# Patient Record
Sex: MALE | Race: WHITE | NOT HISPANIC OR LATINO
[De-identification: names, ages, dates, MRNs, and addresses within clinical notes are randomized per-mention and may not be internally consistent; named-entity substitution may affect disease eponyms.]

---

## 2017-09-27 PROBLEM — Z00.00 ENCOUNTER FOR PREVENTIVE HEALTH EXAMINATION: Status: ACTIVE | Noted: 2017-09-27

## 2017-09-28 ENCOUNTER — APPOINTMENT (OUTPATIENT)
Dept: SURGERY | Facility: CLINIC | Age: 67
End: 2017-09-28
Payer: MEDICARE

## 2017-09-28 VITALS — WEIGHT: 124 LBS | HEIGHT: 66 IN | BODY MASS INDEX: 19.93 KG/M2

## 2017-09-28 PROCEDURE — 99203 OFFICE O/P NEW LOW 30 MIN: CPT

## 2017-10-27 ENCOUNTER — OUTPATIENT (OUTPATIENT)
Dept: OUTPATIENT SERVICES | Facility: HOSPITAL | Age: 67
LOS: 1 days | Discharge: HOME | End: 2017-10-27

## 2017-10-27 ENCOUNTER — APPOINTMENT (OUTPATIENT)
Dept: SURGERY | Facility: AMBULATORY SURGERY CENTER | Age: 67
End: 2017-10-27
Payer: MEDICARE

## 2017-10-27 PROCEDURE — 49505 PRP I/HERN INIT REDUC >5 YR: CPT | Mod: LT

## 2017-10-30 ENCOUNTER — TRANSCRIPTION ENCOUNTER (OUTPATIENT)
Age: 67
End: 2017-10-30

## 2017-10-31 DIAGNOSIS — K40.90 UNILATERAL INGUINAL HERNIA, WITHOUT OBSTRUCTION OR GANGRENE, NOT SPECIFIED AS RECURRENT: ICD-10-CM

## 2017-10-31 DIAGNOSIS — I10 ESSENTIAL (PRIMARY) HYPERTENSION: ICD-10-CM

## 2017-10-31 DIAGNOSIS — N40.0 BENIGN PROSTATIC HYPERPLASIA WITHOUT LOWER URINARY TRACT SYMPTOMS: ICD-10-CM

## 2017-11-07 ENCOUNTER — APPOINTMENT (OUTPATIENT)
Dept: SURGERY | Facility: CLINIC | Age: 67
End: 2017-11-07
Payer: MEDICARE

## 2017-11-07 PROCEDURE — 99024 POSTOP FOLLOW-UP VISIT: CPT

## 2018-04-27 ENCOUNTER — EMERGENCY (EMERGENCY)
Facility: HOSPITAL | Age: 68
LOS: 0 days | Discharge: HOME | End: 2018-04-28
Attending: EMERGENCY MEDICINE | Admitting: EMERGENCY MEDICINE

## 2018-04-27 VITALS
HEIGHT: 66 IN | DIASTOLIC BLOOD PRESSURE: 65 MMHG | SYSTOLIC BLOOD PRESSURE: 159 MMHG | TEMPERATURE: 98 F | WEIGHT: 125 LBS | OXYGEN SATURATION: 96 % | RESPIRATION RATE: 18 BRPM | HEART RATE: 80 BPM

## 2018-04-27 DIAGNOSIS — Z85.828 PERSONAL HISTORY OF OTHER MALIGNANT NEOPLASM OF SKIN: ICD-10-CM

## 2018-04-27 DIAGNOSIS — I10 ESSENTIAL (PRIMARY) HYPERTENSION: ICD-10-CM

## 2018-04-27 DIAGNOSIS — L76.22 POSTPROCEDURAL HEMORRHAGE OF SKIN AND SUBCUTANEOUS TISSUE FOLLOWING OTHER PROCEDURE: ICD-10-CM

## 2018-04-27 DIAGNOSIS — Z94.5 SKIN TRANSPLANT STATUS: ICD-10-CM

## 2018-04-27 DIAGNOSIS — Z98.890 OTHER SPECIFIED POSTPROCEDURAL STATES: Chronic | ICD-10-CM

## 2018-04-27 DIAGNOSIS — N40.0 BENIGN PROSTATIC HYPERPLASIA WITHOUT LOWER URINARY TRACT SYMPTOMS: ICD-10-CM

## 2018-04-27 NOTE — ED ADULT TRIAGE NOTE - CHIEF COMPLAINT QUOTE
pt BIBA, for uncontrolled bleeding to suture site. pt had a mohs surgery today to remove skin cancer, sutures in place.

## 2018-04-28 VITALS
HEART RATE: 78 BPM | RESPIRATION RATE: 18 BRPM | OXYGEN SATURATION: 97 % | TEMPERATURE: 98 F | SYSTOLIC BLOOD PRESSURE: 147 MMHG | DIASTOLIC BLOOD PRESSURE: 64 MMHG

## 2018-04-28 NOTE — ED PROVIDER NOTE - ATTENDING CONTRIBUTION TO CARE
66 yo male s/p surgery with graft c/o oozing from surgical site.  Pt removed dressing pta in ED.  Small amount of oozing from left wound margin.  Plan  to page his surgeon and reeval.

## 2018-04-28 NOTE — ED PROVIDER NOTE - OBJECTIVE STATEMENT
66 yo male with PMHx BPH, HTN, skin cancer presents for oozing from surgical wound from this morning. Patient has graft surgery this morning at [facility] and oozing from wound made him nervous so he come to ED. Patient denies fevers, chest pain, SOB, syncope, abdominal pain, nausea, vomiting, diarrhea, constipation, dizziness, weakness, recent travel, changes in PO intake, changes in urine output, changes in mental status.

## 2018-04-28 NOTE — ED PROVIDER NOTE - PHYSICAL EXAMINATION
GEN: Well appearing, in no apparent distress.    HEAD:  Normocephalic, (+) sutures in place on top of head and continues to forehead with bridge to nasal tip. oozing from bridge of skin from forehead to nose tip.    EYES:  Clear conjunctivae without injection, drainage or discharge.    ENMT:  Nasal mucosa moist; mouth moist without ulcerations or lesions; throat moist without erythema, exudate, ulcerations or lesions.    NECK:  Supple, no masses, no significant lymphadenopathy. Normal ROM.    CARDIAC:  RRR, normal S1 and S2, no murmurs, rubs or gallops.    RESP:  Respiratory rate and effort appear normal; lungs are clear to auscultation bilaterally; no rhonchi, rales or wheezes.    ABDOMEN:  Soft, non-tender, non-distended, no masses. Normal BS throughout.    MUSCULOSKELETAL/NEURO:  AAO x 3. Motor 5/5. Sensory intact. CN II – XII intact. Patient able to ambulate without difficulty.    SKIN:  Normal skin color for age and race, well-perfused; warm and dry.

## 2018-04-28 NOTE — ED PROVIDER NOTE - NS ED ROS FT
Constitutional: No fevers/chills.    Head: No injury, headache.    Eyes:  No visual changes, eye pain or discharge. No injury.    ENMT:  No hearing changes, pain, discharge or infections. No neck pain or stiffness. No loss ROM.    Cardiac:  No chest pain, SOB or edema. No chest pain with exertion.    Respiratory:  No cough or respiratory distress.     GI:  No nausea, vomiting, diarrhea or abdominal pain. No anorexia. No change in PO intake.    :  No dysuria, frequency, urgency or burning. No change in urine output.    MS:  No myalgia, muscle weakness, joint pain or back pain. No loss ROM.    Neuro:  No dizziness or weakness.  No LOC.    Skin:  No skin rash.     Endocrine: No history of thyroid disease or diabetes.

## 2018-08-27 PROBLEM — C44.90 UNSPECIFIED MALIGNANT NEOPLASM OF SKIN, UNSPECIFIED: Chronic | Status: ACTIVE | Noted: 2018-04-27

## 2018-08-27 PROBLEM — I10 ESSENTIAL (PRIMARY) HYPERTENSION: Chronic | Status: ACTIVE | Noted: 2018-04-27

## 2018-08-27 PROBLEM — N40.0 BENIGN PROSTATIC HYPERPLASIA WITHOUT LOWER URINARY TRACT SYMPTOMS: Chronic | Status: ACTIVE | Noted: 2018-04-27

## 2018-09-20 ENCOUNTER — OUTPATIENT (OUTPATIENT)
Dept: OUTPATIENT SERVICES | Facility: HOSPITAL | Age: 68
LOS: 1 days | Discharge: HOME | End: 2018-09-20

## 2018-09-20 DIAGNOSIS — Z98.890 OTHER SPECIFIED POSTPROCEDURAL STATES: Chronic | ICD-10-CM

## 2018-09-21 DIAGNOSIS — N39.0 URINARY TRACT INFECTION, SITE NOT SPECIFIED: ICD-10-CM

## 2018-10-03 ENCOUNTER — OUTPATIENT (OUTPATIENT)
Dept: OUTPATIENT SERVICES | Facility: HOSPITAL | Age: 68
LOS: 1 days | Discharge: HOME | End: 2018-10-03

## 2018-10-03 DIAGNOSIS — Z98.890 OTHER SPECIFIED POSTPROCEDURAL STATES: Chronic | ICD-10-CM

## 2018-10-03 DIAGNOSIS — R51 HEADACHE: ICD-10-CM

## 2018-10-05 ENCOUNTER — APPOINTMENT (OUTPATIENT)
Dept: UROLOGY | Facility: CLINIC | Age: 68
End: 2018-10-05
Payer: MEDICARE

## 2018-10-05 VITALS
SYSTOLIC BLOOD PRESSURE: 208 MMHG | DIASTOLIC BLOOD PRESSURE: 70 MMHG | HEART RATE: 66 BPM | BODY MASS INDEX: 19.93 KG/M2 | HEIGHT: 66 IN | WEIGHT: 124 LBS

## 2018-10-05 DIAGNOSIS — Z85.118 PERSONAL HISTORY OF OTHER MALIGNANT NEOPLASM OF BRONCHUS AND LUNG: ICD-10-CM

## 2018-10-05 DIAGNOSIS — R35.1 NOCTURIA: ICD-10-CM

## 2018-10-05 DIAGNOSIS — K46.9 UNSPECIFIED ABDOMINAL HERNIA W/OUT OBSTRUCTION OR GANGRENE: ICD-10-CM

## 2018-10-05 DIAGNOSIS — Z78.9 OTHER SPECIFIED HEALTH STATUS: ICD-10-CM

## 2018-10-05 DIAGNOSIS — I10 ESSENTIAL (PRIMARY) HYPERTENSION: ICD-10-CM

## 2018-10-05 DIAGNOSIS — Z80.1 FAMILY HISTORY OF MALIGNANT NEOPLASM OF TRACHEA, BRONCHUS AND LUNG: ICD-10-CM

## 2018-10-05 PROCEDURE — 99203 OFFICE O/P NEW LOW 30 MIN: CPT

## 2018-10-05 RX ORDER — TAMSULOSIN HYDROCHLORIDE 0.4 MG/1
0.4 CAPSULE ORAL
Refills: 0 | Status: ACTIVE | COMMUNITY

## 2018-10-05 RX ORDER — LOSARTAN POTASSIUM 100 MG/1
100 TABLET, FILM COATED ORAL
Refills: 0 | Status: ACTIVE | COMMUNITY

## 2018-10-05 RX ORDER — PRAVASTATIN SODIUM 20 MG/1
20 TABLET ORAL
Refills: 0 | Status: ACTIVE | COMMUNITY

## 2018-10-30 ENCOUNTER — OUTPATIENT (OUTPATIENT)
Dept: OUTPATIENT SERVICES | Facility: HOSPITAL | Age: 68
LOS: 1 days | Discharge: HOME | End: 2018-10-30

## 2018-10-30 DIAGNOSIS — F41.1 GENERALIZED ANXIETY DISORDER: ICD-10-CM

## 2018-10-30 DIAGNOSIS — Z98.890 OTHER SPECIFIED POSTPROCEDURAL STATES: Chronic | ICD-10-CM

## 2018-11-15 ENCOUNTER — OUTPATIENT (OUTPATIENT)
Dept: OUTPATIENT SERVICES | Facility: HOSPITAL | Age: 68
LOS: 1 days | Discharge: HOME | End: 2018-11-15

## 2018-11-15 DIAGNOSIS — F33.3 MAJOR DEPRESSIVE DISORDER, RECURRENT, SEVERE WITH PSYCHOTIC SYMPTOMS: ICD-10-CM

## 2018-11-15 DIAGNOSIS — Z98.890 OTHER SPECIFIED POSTPROCEDURAL STATES: Chronic | ICD-10-CM

## 2018-12-06 ENCOUNTER — OUTPATIENT (OUTPATIENT)
Dept: OUTPATIENT SERVICES | Facility: HOSPITAL | Age: 68
LOS: 1 days | Discharge: HOME | End: 2018-12-06

## 2018-12-06 DIAGNOSIS — F33.3 MAJOR DEPRESSIVE DISORDER, RECURRENT, SEVERE WITH PSYCHOTIC SYMPTOMS: ICD-10-CM

## 2018-12-06 DIAGNOSIS — Z98.890 OTHER SPECIFIED POSTPROCEDURAL STATES: Chronic | ICD-10-CM

## 2018-12-18 ENCOUNTER — FORM ENCOUNTER (OUTPATIENT)
Age: 68
End: 2018-12-18

## 2018-12-19 ENCOUNTER — OUTPATIENT (OUTPATIENT)
Dept: OUTPATIENT SERVICES | Facility: HOSPITAL | Age: 68
LOS: 1 days | Discharge: HOME | End: 2018-12-19

## 2018-12-19 DIAGNOSIS — R35.1 NOCTURIA: ICD-10-CM

## 2018-12-19 DIAGNOSIS — Z98.890 OTHER SPECIFIED POSTPROCEDURAL STATES: Chronic | ICD-10-CM

## 2019-01-07 ENCOUNTER — OUTPATIENT (OUTPATIENT)
Dept: OUTPATIENT SERVICES | Facility: HOSPITAL | Age: 69
LOS: 1 days | Discharge: HOME | End: 2019-01-07

## 2019-01-07 DIAGNOSIS — F33.3 MAJOR DEPRESSIVE DISORDER, RECURRENT, SEVERE WITH PSYCHOTIC SYMPTOMS: ICD-10-CM

## 2019-01-07 DIAGNOSIS — Z98.890 OTHER SPECIFIED POSTPROCEDURAL STATES: Chronic | ICD-10-CM

## 2019-01-27 ENCOUNTER — LABORATORY RESULT (OUTPATIENT)
Age: 69
End: 2019-01-27

## 2019-01-28 ENCOUNTER — OUTPATIENT (OUTPATIENT)
Dept: OUTPATIENT SERVICES | Facility: HOSPITAL | Age: 69
LOS: 1 days | Discharge: HOME | End: 2019-01-28

## 2019-01-28 ENCOUNTER — APPOINTMENT (OUTPATIENT)
Dept: UROLOGY | Facility: CLINIC | Age: 69
End: 2019-01-28
Payer: MEDICARE

## 2019-01-28 DIAGNOSIS — Z98.890 OTHER SPECIFIED POSTPROCEDURAL STATES: Chronic | ICD-10-CM

## 2019-01-28 DIAGNOSIS — R35.1 NOCTURIA: ICD-10-CM

## 2019-01-28 LAB
BILIRUB UR QL STRIP: NORMAL
CLARITY UR: CLEAR
COLLECTION METHOD: NORMAL
GLUCOSE UR-MCNC: NORMAL
HCG UR QL: NORMAL EU/DL
HGB UR QL STRIP.AUTO: NORMAL
KETONES UR-MCNC: NORMAL
LEUKOCYTE ESTERASE UR QL STRIP: 500
NITRITE UR QL STRIP: NORMAL
PH UR STRIP: 6
PROT UR STRIP-MCNC: NORMAL
SP GR UR STRIP: 1.01

## 2019-01-28 PROCEDURE — 52000 CYSTOURETHROSCOPY: CPT

## 2019-01-28 PROCEDURE — 81003 URINALYSIS AUTO W/O SCOPE: CPT | Mod: QW

## 2019-01-29 LAB
APPEARANCE: ABNORMAL
BILIRUBIN URINE: NEGATIVE
BLOOD URINE: NEGATIVE
COLOR: YELLOW
GLUCOSE QUALITATIVE U: NEGATIVE MG/DL
KETONES URINE: NEGATIVE
LEUKOCYTE ESTERASE URINE: ABNORMAL
NITRITE URINE: NEGATIVE
PH URINE: 7
PROTEIN URINE: NEGATIVE MG/DL
SPECIFIC GRAVITY URINE: 1.01
UROBILINOGEN URINE: 0.2 MG/DL (ref 0.2–?)

## 2019-01-31 LAB — BACTERIA UR CULT: ABNORMAL

## 2019-02-01 DIAGNOSIS — F33.3 MAJOR DEPRESSIVE DISORDER, RECURRENT, SEVERE WITH PSYCHOTIC SYMPTOMS: ICD-10-CM

## 2019-02-06 ENCOUNTER — OUTPATIENT (OUTPATIENT)
Dept: OUTPATIENT SERVICES | Facility: HOSPITAL | Age: 69
LOS: 1 days | Discharge: HOME | End: 2019-02-06

## 2019-02-06 DIAGNOSIS — F33.3 MAJOR DEPRESSIVE DISORDER, RECURRENT, SEVERE WITH PSYCHOTIC SYMPTOMS: ICD-10-CM

## 2019-02-06 DIAGNOSIS — Z98.890 OTHER SPECIFIED POSTPROCEDURAL STATES: Chronic | ICD-10-CM

## 2019-02-13 ENCOUNTER — OUTPATIENT (OUTPATIENT)
Dept: OUTPATIENT SERVICES | Facility: HOSPITAL | Age: 69
LOS: 1 days | Discharge: HOME | End: 2019-02-13

## 2019-02-13 VITALS
TEMPERATURE: 97 F | HEIGHT: 66 IN | DIASTOLIC BLOOD PRESSURE: 64 MMHG | OXYGEN SATURATION: 100 % | HEART RATE: 63 BPM | RESPIRATION RATE: 15 BRPM | SYSTOLIC BLOOD PRESSURE: 140 MMHG | WEIGHT: 104.5 LBS

## 2019-02-13 DIAGNOSIS — Z01.818 ENCOUNTER FOR OTHER PREPROCEDURAL EXAMINATION: ICD-10-CM

## 2019-02-13 DIAGNOSIS — C80.1 MALIGNANT (PRIMARY) NEOPLASM, UNSPECIFIED: ICD-10-CM

## 2019-02-13 DIAGNOSIS — C67.9 MALIGNANT NEOPLASM OF BLADDER, UNSPECIFIED: ICD-10-CM

## 2019-02-13 DIAGNOSIS — F41.9 ANXIETY DISORDER, UNSPECIFIED: ICD-10-CM

## 2019-02-13 DIAGNOSIS — K40.90 UNILATERAL INGUINAL HERNIA, WITHOUT OBSTRUCTION OR GANGRENE, NOT SPECIFIED AS RECURRENT: Chronic | ICD-10-CM

## 2019-02-13 DIAGNOSIS — Z98.890 OTHER SPECIFIED POSTPROCEDURAL STATES: Chronic | ICD-10-CM

## 2019-02-13 DIAGNOSIS — E78.00 PURE HYPERCHOLESTEROLEMIA, UNSPECIFIED: ICD-10-CM

## 2019-02-13 DIAGNOSIS — F32.9 MAJOR DEPRESSIVE DISORDER, SINGLE EPISODE, UNSPECIFIED: ICD-10-CM

## 2019-02-13 DIAGNOSIS — I10 ESSENTIAL (PRIMARY) HYPERTENSION: ICD-10-CM

## 2019-02-13 DIAGNOSIS — N40.0 BENIGN PROSTATIC HYPERPLASIA WITHOUT LOWER URINARY TRACT SYMPTOMS: ICD-10-CM

## 2019-02-13 LAB
ALBUMIN SERPL ELPH-MCNC: 3.9 G/DL — SIGNIFICANT CHANGE UP (ref 3.5–5.2)
ALP SERPL-CCNC: 95 U/L — SIGNIFICANT CHANGE UP (ref 30–115)
ALT FLD-CCNC: 15 U/L — SIGNIFICANT CHANGE UP (ref 0–41)
ANION GAP SERPL CALC-SCNC: 13 MMOL/L — SIGNIFICANT CHANGE UP (ref 7–14)
APPEARANCE UR: CLEAR — SIGNIFICANT CHANGE UP
APTT BLD: 31 SEC — SIGNIFICANT CHANGE UP (ref 27–39.2)
AST SERPL-CCNC: 21 U/L — SIGNIFICANT CHANGE UP (ref 0–41)
BILIRUB SERPL-MCNC: 0.4 MG/DL — SIGNIFICANT CHANGE UP (ref 0.2–1.2)
BILIRUB UR-MCNC: NEGATIVE — SIGNIFICANT CHANGE UP
BUN SERPL-MCNC: 23 MG/DL — HIGH (ref 10–20)
CALCIUM SERPL-MCNC: 9.4 MG/DL — SIGNIFICANT CHANGE UP (ref 8.5–10.1)
CHLORIDE SERPL-SCNC: 99 MMOL/L — SIGNIFICANT CHANGE UP (ref 98–110)
CO2 SERPL-SCNC: 28 MMOL/L — SIGNIFICANT CHANGE UP (ref 17–32)
COLOR SPEC: YELLOW — SIGNIFICANT CHANGE UP
CREAT SERPL-MCNC: 1 MG/DL — SIGNIFICANT CHANGE UP (ref 0.7–1.5)
DIFF PNL FLD: NEGATIVE — SIGNIFICANT CHANGE UP
GLUCOSE SERPL-MCNC: 57 MG/DL — LOW (ref 70–99)
GLUCOSE UR QL: NEGATIVE MG/DL — SIGNIFICANT CHANGE UP
HCT VFR BLD CALC: 31.5 % — LOW (ref 42–52)
HGB BLD-MCNC: 10.2 G/DL — LOW (ref 14–18)
INR BLD: 0.91 RATIO — SIGNIFICANT CHANGE UP (ref 0.65–1.3)
KETONES UR-MCNC: NEGATIVE — SIGNIFICANT CHANGE UP
LEUKOCYTE ESTERASE UR-ACNC: NEGATIVE — SIGNIFICANT CHANGE UP
MCHC RBC-ENTMCNC: 28.8 PG — SIGNIFICANT CHANGE UP (ref 27–31)
MCHC RBC-ENTMCNC: 32.4 G/DL — SIGNIFICANT CHANGE UP (ref 32–37)
MCV RBC AUTO: 89 FL — SIGNIFICANT CHANGE UP (ref 80–94)
NITRITE UR-MCNC: NEGATIVE — SIGNIFICANT CHANGE UP
NRBC # BLD: 0 /100 WBCS — SIGNIFICANT CHANGE UP (ref 0–0)
PH UR: 7 — SIGNIFICANT CHANGE UP (ref 5–8)
PLATELET # BLD AUTO: 227 K/UL — SIGNIFICANT CHANGE UP (ref 130–400)
POTASSIUM SERPL-MCNC: 4.5 MMOL/L — SIGNIFICANT CHANGE UP (ref 3.5–5)
POTASSIUM SERPL-SCNC: 4.5 MMOL/L — SIGNIFICANT CHANGE UP (ref 3.5–5)
PROT SERPL-MCNC: 6.7 G/DL — SIGNIFICANT CHANGE UP (ref 6–8)
PROT UR-MCNC: NEGATIVE MG/DL — SIGNIFICANT CHANGE UP
PROTHROM AB SERPL-ACNC: 10.5 SEC — SIGNIFICANT CHANGE UP (ref 9.95–12.87)
RBC # BLD: 3.54 M/UL — LOW (ref 4.7–6.1)
RBC # FLD: 13.1 % — SIGNIFICANT CHANGE UP (ref 11.5–14.5)
SODIUM SERPL-SCNC: 140 MMOL/L — SIGNIFICANT CHANGE UP (ref 135–146)
SP GR SPEC: 1.02 — SIGNIFICANT CHANGE UP (ref 1.01–1.03)
UROBILINOGEN FLD QL: 0.2 MG/DL — SIGNIFICANT CHANGE UP (ref 0.2–0.2)
WBC # BLD: 7.16 K/UL — SIGNIFICANT CHANGE UP (ref 4.8–10.8)
WBC # FLD AUTO: 7.16 K/UL — SIGNIFICANT CHANGE UP (ref 4.8–10.8)

## 2019-02-13 NOTE — H&P PST ADULT - HISTORY OF PRESENT ILLNESS
68 year old male here for cystoscopy ,patient was getting a "strange feeling like he had to urinate"  fos=1  denies chest pain sob palp  denies recent uri or uti

## 2019-02-14 LAB
CULTURE RESULTS: NO GROWTH — SIGNIFICANT CHANGE UP
SPECIMEN SOURCE: SIGNIFICANT CHANGE UP

## 2019-02-27 ENCOUNTER — RESULT REVIEW (OUTPATIENT)
Age: 69
End: 2019-02-27

## 2019-02-27 ENCOUNTER — OUTPATIENT (OUTPATIENT)
Dept: OUTPATIENT SERVICES | Facility: HOSPITAL | Age: 69
LOS: 1 days | Discharge: HOME | End: 2019-02-27

## 2019-02-27 ENCOUNTER — APPOINTMENT (OUTPATIENT)
Dept: UROLOGY | Facility: HOSPITAL | Age: 69
End: 2019-02-27
Payer: MEDICARE

## 2019-02-27 VITALS
HEIGHT: 66 IN | OXYGEN SATURATION: 100 % | SYSTOLIC BLOOD PRESSURE: 123 MMHG | DIASTOLIC BLOOD PRESSURE: 55 MMHG | RESPIRATION RATE: 18 BRPM | TEMPERATURE: 97 F | HEART RATE: 62 BPM | WEIGHT: 104.06 LBS

## 2019-02-27 VITALS — DIASTOLIC BLOOD PRESSURE: 59 MMHG | HEART RATE: 68 BPM | SYSTOLIC BLOOD PRESSURE: 168 MMHG | RESPIRATION RATE: 17 BRPM

## 2019-02-27 DIAGNOSIS — K40.90 UNILATERAL INGUINAL HERNIA, WITHOUT OBSTRUCTION OR GANGRENE, NOT SPECIFIED AS RECURRENT: Chronic | ICD-10-CM

## 2019-02-27 DIAGNOSIS — Z98.890 OTHER SPECIFIED POSTPROCEDURAL STATES: Chronic | ICD-10-CM

## 2019-02-27 PROBLEM — F41.9 ANXIETY DISORDER, UNSPECIFIED: Chronic | Status: ACTIVE | Noted: 2019-02-13

## 2019-02-27 PROBLEM — E78.00 PURE HYPERCHOLESTEROLEMIA, UNSPECIFIED: Chronic | Status: ACTIVE | Noted: 2019-02-13

## 2019-02-27 PROBLEM — F32.9 MAJOR DEPRESSIVE DISORDER, SINGLE EPISODE, UNSPECIFIED: Chronic | Status: ACTIVE | Noted: 2019-02-13

## 2019-02-27 PROCEDURE — 52224 CYSTOSCOPY AND TREATMENT: CPT

## 2019-02-27 RX ORDER — ONDANSETRON 8 MG/1
4 TABLET, FILM COATED ORAL ONCE
Qty: 0 | Refills: 0 | Status: DISCONTINUED | OUTPATIENT
Start: 2019-02-27 | End: 2019-02-27

## 2019-02-27 RX ORDER — SODIUM CHLORIDE 9 MG/ML
1000 INJECTION, SOLUTION INTRAVENOUS
Qty: 0 | Refills: 0 | Status: DISCONTINUED | OUTPATIENT
Start: 2019-02-27 | End: 2019-02-27

## 2019-02-27 RX ORDER — HYDROMORPHONE HYDROCHLORIDE 2 MG/ML
0.5 INJECTION INTRAMUSCULAR; INTRAVENOUS; SUBCUTANEOUS
Qty: 0 | Refills: 0 | Status: DISCONTINUED | OUTPATIENT
Start: 2019-02-27 | End: 2019-02-27

## 2019-02-27 RX ADMIN — SODIUM CHLORIDE 100 MILLILITER(S): 9 INJECTION, SOLUTION INTRAVENOUS at 09:10

## 2019-02-27 NOTE — BRIEF OPERATIVE NOTE - PROCEDURE
<<-----Click on this checkbox to enter Procedure Cystoscopy  02/27/2019  w bladder biopsy and fulguration  Active  JERRI

## 2019-02-27 NOTE — ASU PATIENT PROFILE, ADULT - PMH
Anxiety    BPH (benign prostatic hyperplasia)    Depression    Hypercholesteremia    Hypertension    Skin cancer

## 2019-02-27 NOTE — ASU DISCHARGE PLAN (ADULT/PEDIATRIC). - SPECIAL INSTRUCTIONS
You may take tylenol and/or ibuprofen (advil/motrin) for pain as needed.  Blood In urine is expected. if you unable to urinate or if you develop fevers or chills >100.4 not improving w tylenol you should visit emergency room and alert Dr Chavez.  Dr Chavez's office will call you for a follow up appointment in 2 weeks to review results You may take tylenol and/or ibuprofen (advil/motrin) for pain as needed. Please  antibiotics in pharmacy to start taking today  Blood In urine is expected. if you unable to urinate or if you develop fevers or chills >100.4 not improving w tylenol you should visit emergency room and alert Dr Chavez.  Dr Chavez's office will call you for a follow up appointment in 2 weeks to review results

## 2019-02-27 NOTE — BRIEF OPERATIVE NOTE - OPERATION/FINDINGS
no evidence of bladder tumors, right lateral wall very mild erythema, redundant dome mucosa/nodule at dome biopsied

## 2019-03-05 LAB — SURGICAL PATHOLOGY STUDY: SIGNIFICANT CHANGE UP

## 2019-03-06 ENCOUNTER — OUTPATIENT (OUTPATIENT)
Dept: OUTPATIENT SERVICES | Facility: HOSPITAL | Age: 69
LOS: 1 days | Discharge: HOME | End: 2019-03-06

## 2019-03-06 DIAGNOSIS — K40.90 UNILATERAL INGUINAL HERNIA, WITHOUT OBSTRUCTION OR GANGRENE, NOT SPECIFIED AS RECURRENT: Chronic | ICD-10-CM

## 2019-03-06 DIAGNOSIS — F33.3 MAJOR DEPRESSIVE DISORDER, RECURRENT, SEVERE WITH PSYCHOTIC SYMPTOMS: ICD-10-CM

## 2019-03-06 DIAGNOSIS — Z98.890 OTHER SPECIFIED POSTPROCEDURAL STATES: Chronic | ICD-10-CM

## 2019-03-07 DIAGNOSIS — I10 ESSENTIAL (PRIMARY) HYPERTENSION: ICD-10-CM

## 2019-03-07 DIAGNOSIS — N32.9 BLADDER DISORDER, UNSPECIFIED: ICD-10-CM

## 2019-03-07 DIAGNOSIS — E78.00 PURE HYPERCHOLESTEROLEMIA, UNSPECIFIED: ICD-10-CM

## 2019-03-07 DIAGNOSIS — N30.20 OTHER CHRONIC CYSTITIS WITHOUT HEMATURIA: ICD-10-CM

## 2019-03-07 DIAGNOSIS — F41.8 OTHER SPECIFIED ANXIETY DISORDERS: ICD-10-CM

## 2019-03-12 ENCOUNTER — APPOINTMENT (OUTPATIENT)
Dept: UROLOGY | Facility: CLINIC | Age: 69
End: 2019-03-12
Payer: MEDICARE

## 2019-03-12 DIAGNOSIS — N32.9 BLADDER DISORDER, UNSPECIFIED: ICD-10-CM

## 2019-03-12 DIAGNOSIS — N32.89 OTHER SPECIFIED DISORDERS OF BLADDER: ICD-10-CM

## 2019-03-12 PROCEDURE — 99213 OFFICE O/P EST LOW 20 MIN: CPT

## 2019-03-12 NOTE — LETTER HEADER
[FreeTextEntry3] : Jose Daniel Chavez MD\par [facility]\par \par [facility]\par Division of Urology\par [address]\par [address]\[place] [address]\par Tel [phone]\par Fax [phone]\par \par

## 2019-03-12 NOTE — LETTER BODY
[Dear  ___] : Dear  [unfilled], [Consult Letter:] : I had the pleasure of evaluating your patient, [unfilled]. [Please see my note below.] : Please see my note below. [Consult Closing:] : Thank you very much for allowing me to participate in the care of this patient.  If you have any questions, please do not hesitate to contact me. [Sincerely,] : Sincerely, [FreeTextEntry2] : Pasquale Menjivar MD [FreeTextEntry3] : Jose Daniel Chavez MD\par [facility] Surgery\par [facility]\par Division of Urology\par

## 2019-03-12 NOTE — HISTORY OF PRESENT ILLNESS
[FreeTextEntry1] : MATTHEW RAO is a 68 year old male presents who was found to have a possible lesion in the bladder sp cytoscopy bladder biopsy.\par PATHOLOGY negative.\par Findings included-- no evidence of bladder tumors, right lateral wall very mild erythema, redundant dome mucosa/nodule at dome biopsied and fulgurated.\par  \par UCytol neg\par No microheme\par doing well post op no hematuria f c

## 2019-03-12 NOTE — ASSESSMENT
[FreeTextEntry1] : MATTHEW RAO is a 68 year old male presents who was found to have a possible lesion in the bladder sp cytoscopy bladder biopsy.\par PATHOLOGY negative.\par Given the patient had no microscopic and his urine cytology was negative, he can follow up on an as-needed basis and if he develops worsening LUTS or hematuria. I also explained this to the patient's son over the phone.\par \par I will search patient to follow up with his primary care physician for this severe recent weight loss\par

## 2019-03-13 ENCOUNTER — TRANSCRIPTION ENCOUNTER (OUTPATIENT)
Age: 69
End: 2019-03-13

## 2019-04-01 ENCOUNTER — OUTPATIENT (OUTPATIENT)
Dept: OUTPATIENT SERVICES | Facility: HOSPITAL | Age: 69
LOS: 1 days | Discharge: HOME | End: 2019-04-01

## 2019-04-01 DIAGNOSIS — F33.3 MAJOR DEPRESSIVE DISORDER, RECURRENT, SEVERE WITH PSYCHOTIC SYMPTOMS: ICD-10-CM

## 2019-04-01 DIAGNOSIS — Z98.890 OTHER SPECIFIED POSTPROCEDURAL STATES: Chronic | ICD-10-CM

## 2019-04-01 DIAGNOSIS — K40.90 UNILATERAL INGUINAL HERNIA, WITHOUT OBSTRUCTION OR GANGRENE, NOT SPECIFIED AS RECURRENT: Chronic | ICD-10-CM

## 2019-05-01 ENCOUNTER — OUTPATIENT (OUTPATIENT)
Dept: OUTPATIENT SERVICES | Facility: HOSPITAL | Age: 69
LOS: 1 days | Discharge: HOME | End: 2019-05-01
Payer: MEDICARE

## 2019-05-01 DIAGNOSIS — F33.3 MAJOR DEPRESSIVE DISORDER, RECURRENT, SEVERE WITH PSYCHOTIC SYMPTOMS: ICD-10-CM

## 2019-05-01 DIAGNOSIS — Z98.890 OTHER SPECIFIED POSTPROCEDURAL STATES: Chronic | ICD-10-CM

## 2019-05-01 DIAGNOSIS — K40.90 UNILATERAL INGUINAL HERNIA, WITHOUT OBSTRUCTION OR GANGRENE, NOT SPECIFIED AS RECURRENT: Chronic | ICD-10-CM

## 2019-05-01 PROCEDURE — 99213 OFFICE O/P EST LOW 20 MIN: CPT

## 2019-06-13 ENCOUNTER — OUTPATIENT (OUTPATIENT)
Dept: OUTPATIENT SERVICES | Facility: HOSPITAL | Age: 69
LOS: 1 days | Discharge: HOME | End: 2019-06-13
Payer: MEDICARE

## 2019-06-13 DIAGNOSIS — Z98.890 OTHER SPECIFIED POSTPROCEDURAL STATES: Chronic | ICD-10-CM

## 2019-06-13 DIAGNOSIS — F33.3 MAJOR DEPRESSIVE DISORDER, RECURRENT, SEVERE WITH PSYCHOTIC SYMPTOMS: ICD-10-CM

## 2019-06-13 DIAGNOSIS — K40.90 UNILATERAL INGUINAL HERNIA, WITHOUT OBSTRUCTION OR GANGRENE, NOT SPECIFIED AS RECURRENT: Chronic | ICD-10-CM

## 2019-06-13 PROCEDURE — 99213 OFFICE O/P EST LOW 20 MIN: CPT

## 2019-07-15 ENCOUNTER — OUTPATIENT (OUTPATIENT)
Dept: OUTPATIENT SERVICES | Facility: HOSPITAL | Age: 69
LOS: 1 days | Discharge: HOME | End: 2019-07-15
Payer: MEDICARE

## 2019-07-15 DIAGNOSIS — Z98.890 OTHER SPECIFIED POSTPROCEDURAL STATES: Chronic | ICD-10-CM

## 2019-07-15 DIAGNOSIS — K40.90 UNILATERAL INGUINAL HERNIA, WITHOUT OBSTRUCTION OR GANGRENE, NOT SPECIFIED AS RECURRENT: Chronic | ICD-10-CM

## 2019-07-15 DIAGNOSIS — F33.3 MAJOR DEPRESSIVE DISORDER, RECURRENT, SEVERE WITH PSYCHOTIC SYMPTOMS: ICD-10-CM

## 2019-07-15 PROCEDURE — 99213 OFFICE O/P EST LOW 20 MIN: CPT

## 2019-08-22 ENCOUNTER — OUTPATIENT (OUTPATIENT)
Dept: OUTPATIENT SERVICES | Facility: HOSPITAL | Age: 69
LOS: 1 days | Discharge: HOME | End: 2019-08-22
Payer: MEDICARE

## 2019-08-22 DIAGNOSIS — Z98.890 OTHER SPECIFIED POSTPROCEDURAL STATES: Chronic | ICD-10-CM

## 2019-08-22 DIAGNOSIS — F33.3 MAJOR DEPRESSIVE DISORDER, RECURRENT, SEVERE WITH PSYCHOTIC SYMPTOMS: ICD-10-CM

## 2019-08-22 DIAGNOSIS — K40.90 UNILATERAL INGUINAL HERNIA, WITHOUT OBSTRUCTION OR GANGRENE, NOT SPECIFIED AS RECURRENT: Chronic | ICD-10-CM

## 2019-08-22 PROCEDURE — 99213 OFFICE O/P EST LOW 20 MIN: CPT

## 2019-10-03 ENCOUNTER — OUTPATIENT (OUTPATIENT)
Dept: OUTPATIENT SERVICES | Facility: HOSPITAL | Age: 69
LOS: 1 days | Discharge: HOME | End: 2019-10-03
Payer: MEDICARE

## 2019-10-03 DIAGNOSIS — Z98.890 OTHER SPECIFIED POSTPROCEDURAL STATES: Chronic | ICD-10-CM

## 2019-10-03 DIAGNOSIS — F33.3 MAJOR DEPRESSIVE DISORDER, RECURRENT, SEVERE WITH PSYCHOTIC SYMPTOMS: ICD-10-CM

## 2019-10-03 DIAGNOSIS — K40.90 UNILATERAL INGUINAL HERNIA, WITHOUT OBSTRUCTION OR GANGRENE, NOT SPECIFIED AS RECURRENT: Chronic | ICD-10-CM

## 2019-10-03 PROCEDURE — 99214 OFFICE O/P EST MOD 30 MIN: CPT

## 2019-11-07 ENCOUNTER — OUTPATIENT (OUTPATIENT)
Dept: OUTPATIENT SERVICES | Facility: HOSPITAL | Age: 69
LOS: 1 days | Discharge: HOME | End: 2019-11-07

## 2019-11-07 DIAGNOSIS — F33.3 MAJOR DEPRESSIVE DISORDER, RECURRENT, SEVERE WITH PSYCHOTIC SYMPTOMS: ICD-10-CM

## 2019-11-07 DIAGNOSIS — K40.90 UNILATERAL INGUINAL HERNIA, WITHOUT OBSTRUCTION OR GANGRENE, NOT SPECIFIED AS RECURRENT: Chronic | ICD-10-CM

## 2019-11-07 DIAGNOSIS — Z98.890 OTHER SPECIFIED POSTPROCEDURAL STATES: Chronic | ICD-10-CM

## 2019-12-23 ENCOUNTER — OUTPATIENT (OUTPATIENT)
Dept: OUTPATIENT SERVICES | Facility: HOSPITAL | Age: 69
LOS: 1 days | Discharge: HOME | End: 2019-12-23
Payer: MEDICARE

## 2019-12-23 DIAGNOSIS — F33.3 MAJOR DEPRESSIVE DISORDER, RECURRENT, SEVERE WITH PSYCHOTIC SYMPTOMS: ICD-10-CM

## 2019-12-23 DIAGNOSIS — K40.90 UNILATERAL INGUINAL HERNIA, WITHOUT OBSTRUCTION OR GANGRENE, NOT SPECIFIED AS RECURRENT: Chronic | ICD-10-CM

## 2019-12-23 DIAGNOSIS — Z98.890 OTHER SPECIFIED POSTPROCEDURAL STATES: Chronic | ICD-10-CM

## 2019-12-23 PROCEDURE — 99213 OFFICE O/P EST LOW 20 MIN: CPT

## 2020-01-27 ENCOUNTER — OUTPATIENT (OUTPATIENT)
Dept: OUTPATIENT SERVICES | Facility: HOSPITAL | Age: 70
LOS: 1 days | Discharge: HOME | End: 2020-01-27
Payer: MEDICARE

## 2020-01-27 DIAGNOSIS — F33.3 MAJOR DEPRESSIVE DISORDER, RECURRENT, SEVERE WITH PSYCHOTIC SYMPTOMS: ICD-10-CM

## 2020-01-27 DIAGNOSIS — Z98.890 OTHER SPECIFIED POSTPROCEDURAL STATES: Chronic | ICD-10-CM

## 2020-01-27 DIAGNOSIS — K40.90 UNILATERAL INGUINAL HERNIA, WITHOUT OBSTRUCTION OR GANGRENE, NOT SPECIFIED AS RECURRENT: Chronic | ICD-10-CM

## 2020-01-27 PROCEDURE — 99213 OFFICE O/P EST LOW 20 MIN: CPT

## 2020-03-16 ENCOUNTER — OUTPATIENT (OUTPATIENT)
Dept: OUTPATIENT SERVICES | Facility: HOSPITAL | Age: 70
LOS: 1 days | Discharge: HOME | End: 2020-03-16
Payer: MEDICARE

## 2020-03-16 DIAGNOSIS — Z98.890 OTHER SPECIFIED POSTPROCEDURAL STATES: Chronic | ICD-10-CM

## 2020-03-16 DIAGNOSIS — F33.3 MAJOR DEPRESSIVE DISORDER, RECURRENT, SEVERE WITH PSYCHOTIC SYMPTOMS: ICD-10-CM

## 2020-03-16 DIAGNOSIS — K40.90 UNILATERAL INGUINAL HERNIA, WITHOUT OBSTRUCTION OR GANGRENE, NOT SPECIFIED AS RECURRENT: Chronic | ICD-10-CM

## 2020-03-16 PROCEDURE — 99213 OFFICE O/P EST LOW 20 MIN: CPT

## 2020-04-02 PROBLEM — N32.9 LESION OF BLADDER: Status: ACTIVE | Noted: 2019-02-27

## 2020-04-29 ENCOUNTER — OUTPATIENT (OUTPATIENT)
Dept: OUTPATIENT SERVICES | Facility: HOSPITAL | Age: 70
LOS: 1 days | Discharge: HOME | End: 2020-04-29
Payer: MEDICARE

## 2020-04-29 DIAGNOSIS — Z98.890 OTHER SPECIFIED POSTPROCEDURAL STATES: Chronic | ICD-10-CM

## 2020-04-29 DIAGNOSIS — K40.90 UNILATERAL INGUINAL HERNIA, WITHOUT OBSTRUCTION OR GANGRENE, NOT SPECIFIED AS RECURRENT: Chronic | ICD-10-CM

## 2020-04-29 DIAGNOSIS — F33.3 MAJOR DEPRESSIVE DISORDER, RECURRENT, SEVERE WITH PSYCHOTIC SYMPTOMS: ICD-10-CM

## 2020-04-29 PROCEDURE — 99443: CPT | Mod: CR

## 2020-06-03 ENCOUNTER — OUTPATIENT (OUTPATIENT)
Dept: OUTPATIENT SERVICES | Facility: HOSPITAL | Age: 70
LOS: 1 days | Discharge: HOME | End: 2020-06-03
Payer: MEDICARE

## 2020-06-03 DIAGNOSIS — Z98.890 OTHER SPECIFIED POSTPROCEDURAL STATES: Chronic | ICD-10-CM

## 2020-06-03 DIAGNOSIS — K40.90 UNILATERAL INGUINAL HERNIA, WITHOUT OBSTRUCTION OR GANGRENE, NOT SPECIFIED AS RECURRENT: Chronic | ICD-10-CM

## 2020-06-03 DIAGNOSIS — F33.3 MAJOR DEPRESSIVE DISORDER, RECURRENT, SEVERE WITH PSYCHOTIC SYMPTOMS: ICD-10-CM

## 2020-06-03 PROCEDURE — 99442: CPT | Mod: 95

## 2020-07-02 ENCOUNTER — OUTPATIENT (OUTPATIENT)
Dept: OUTPATIENT SERVICES | Facility: HOSPITAL | Age: 70
LOS: 1 days | Discharge: HOME | End: 2020-07-02
Payer: MEDICARE

## 2020-07-02 DIAGNOSIS — K40.90 UNILATERAL INGUINAL HERNIA, WITHOUT OBSTRUCTION OR GANGRENE, NOT SPECIFIED AS RECURRENT: Chronic | ICD-10-CM

## 2020-07-02 DIAGNOSIS — F33.3 MAJOR DEPRESSIVE DISORDER, RECURRENT, SEVERE WITH PSYCHOTIC SYMPTOMS: ICD-10-CM

## 2020-07-02 DIAGNOSIS — Z98.890 OTHER SPECIFIED POSTPROCEDURAL STATES: Chronic | ICD-10-CM

## 2020-07-02 PROCEDURE — 99442: CPT | Mod: 95

## 2020-07-31 ENCOUNTER — OUTPATIENT (OUTPATIENT)
Dept: OUTPATIENT SERVICES | Facility: HOSPITAL | Age: 70
LOS: 1 days | Discharge: HOME | End: 2020-07-31
Payer: MEDICARE

## 2020-07-31 DIAGNOSIS — Z98.890 OTHER SPECIFIED POSTPROCEDURAL STATES: Chronic | ICD-10-CM

## 2020-07-31 DIAGNOSIS — F33.3 MAJOR DEPRESSIVE DISORDER, RECURRENT, SEVERE WITH PSYCHOTIC SYMPTOMS: ICD-10-CM

## 2020-07-31 DIAGNOSIS — K40.90 UNILATERAL INGUINAL HERNIA, WITHOUT OBSTRUCTION OR GANGRENE, NOT SPECIFIED AS RECURRENT: Chronic | ICD-10-CM

## 2020-07-31 PROCEDURE — 99442: CPT | Mod: 95

## 2020-09-10 ENCOUNTER — APPOINTMENT (OUTPATIENT)
Dept: SURGERY | Facility: CLINIC | Age: 70
End: 2020-09-10
Payer: MEDICARE

## 2020-09-10 VITALS — BODY MASS INDEX: 19.29 KG/M2 | HEIGHT: 66 IN | WEIGHT: 120 LBS

## 2020-09-10 DIAGNOSIS — K40.90 UNILATERAL INGUINAL HERNIA, W/OUT OBSTRUCTION OR GANGRENE, NOT SPECIFIED AS RECURRENT: ICD-10-CM

## 2020-09-10 DIAGNOSIS — S76.219A STRAIN OF ADDUCTOR MUSCLE, FASCIA AND TENDON OF UNSPECIFIED THIGH, INITIAL ENCOUNTER: ICD-10-CM

## 2020-09-10 PROCEDURE — 99214 OFFICE O/P EST MOD 30 MIN: CPT

## 2020-09-10 NOTE — ASSESSMENT
[FreeTextEntry1] : Cortez presents back to the office nearly 3 years after the repair of his very large pantaloon left inguinal hernia with mesh complaining of one year of discomfort in the left groin which he believes may have been aggravated by the leg press machine at the [facility] fitness center. It is neither worsened or improved during this last year.\par \par Physical examination demonstrates a well healed scar in the left groin with no evidence of hernia recurrence or delayed wound complications. Examination of his right groin demonstrates some mild weakness but no obvious hernia. All testicles are normal. His umbilical examination is unremarkable.\par \par Cortez was counseled and reassured. I believe he is dealing with an acute/chronic groin strain due to overexertion and I recommended alternating ice/heat therapy and nonsteroidal anti-inflammatory medication when necessary, and avoiding strenuous physical activity whenever possible. He was encouraged to return to me in the future if these symptoms persist or worsen, of course.

## 2020-09-10 NOTE — CONSULT LETTER
[Dear  ___] : Dear  [unfilled], [Courtesy Letter:] : I had the pleasure of seeing your patient, [unfilled], in my office today. [Please see my note below.] : Please see my note below. [Consult Closing:] : Thank you very much for allowing me to participate in the care of this patient.  If you have any questions, please do not hesitate to contact me. [FreeTextEntry3] : Respectfully,\par \par Rene Carrillo M.D., FACS\par

## 2020-09-10 NOTE — PHYSICAL EXAM
[JVD] : no jugular venous distention  [No Rash or Lesion] : No rash or lesion [Normal Breath Sounds] : Normal breath sounds [Alert] : alert [Calm] : calm [de-identified] : normal [de-identified] : normal [de-identified] : soft, flat [de-identified] : normal testicles [de-identified] : no hernia recurrence

## 2020-09-24 ENCOUNTER — OUTPATIENT (OUTPATIENT)
Dept: OUTPATIENT SERVICES | Facility: HOSPITAL | Age: 70
LOS: 1 days | Discharge: HOME | End: 2020-09-24
Payer: MEDICARE

## 2020-09-24 DIAGNOSIS — F33.3 MAJOR DEPRESSIVE DISORDER, RECURRENT, SEVERE WITH PSYCHOTIC SYMPTOMS: ICD-10-CM

## 2020-09-24 DIAGNOSIS — Z98.890 OTHER SPECIFIED POSTPROCEDURAL STATES: Chronic | ICD-10-CM

## 2020-09-24 DIAGNOSIS — K40.90 UNILATERAL INGUINAL HERNIA, WITHOUT OBSTRUCTION OR GANGRENE, NOT SPECIFIED AS RECURRENT: Chronic | ICD-10-CM

## 2020-09-24 PROCEDURE — 99442: CPT | Mod: 95

## 2020-11-19 ENCOUNTER — APPOINTMENT (OUTPATIENT)
Dept: PSYCHIATRY | Facility: CLINIC | Age: 70
End: 2020-11-19

## 2020-11-19 ENCOUNTER — OUTPATIENT (OUTPATIENT)
Dept: OUTPATIENT SERVICES | Facility: HOSPITAL | Age: 70
LOS: 1 days | Discharge: HOME | End: 2020-11-19
Payer: MEDICARE

## 2020-11-19 DIAGNOSIS — K40.90 UNILATERAL INGUINAL HERNIA, WITHOUT OBSTRUCTION OR GANGRENE, NOT SPECIFIED AS RECURRENT: Chronic | ICD-10-CM

## 2020-11-19 DIAGNOSIS — F33.3 MAJOR DEPRESSIVE DISORDER, RECURRENT, SEVERE WITH PSYCHOTIC SYMPTOMS: ICD-10-CM

## 2020-11-19 DIAGNOSIS — Z98.890 OTHER SPECIFIED POSTPROCEDURAL STATES: Chronic | ICD-10-CM

## 2020-11-19 PROCEDURE — 99442: CPT | Mod: 95

## 2021-01-11 ENCOUNTER — APPOINTMENT (OUTPATIENT)
Dept: PSYCHIATRY | Facility: CLINIC | Age: 71
End: 2021-01-11
Payer: MEDICARE

## 2021-01-11 ENCOUNTER — OUTPATIENT (OUTPATIENT)
Dept: OUTPATIENT SERVICES | Facility: HOSPITAL | Age: 71
LOS: 1 days | Discharge: HOME | End: 2021-01-11

## 2021-01-11 DIAGNOSIS — K40.90 UNILATERAL INGUINAL HERNIA, WITHOUT OBSTRUCTION OR GANGRENE, NOT SPECIFIED AS RECURRENT: Chronic | ICD-10-CM

## 2021-01-11 DIAGNOSIS — Z98.890 OTHER SPECIFIED POSTPROCEDURAL STATES: Chronic | ICD-10-CM

## 2021-01-11 PROCEDURE — 99213 OFFICE O/P EST LOW 20 MIN: CPT | Mod: 95

## 2021-01-12 DIAGNOSIS — F33.3 MAJOR DEPRESSIVE DISORDER, RECURRENT, SEVERE WITH PSYCHOTIC SYMPTOMS: ICD-10-CM

## 2021-03-08 ENCOUNTER — OUTPATIENT (OUTPATIENT)
Dept: OUTPATIENT SERVICES | Facility: HOSPITAL | Age: 71
LOS: 1 days | Discharge: HOME | End: 2021-03-08

## 2021-03-08 ENCOUNTER — APPOINTMENT (OUTPATIENT)
Dept: PSYCHIATRY | Facility: CLINIC | Age: 71
End: 2021-03-08
Payer: MEDICARE

## 2021-03-08 VITALS — HEIGHT: 66 IN | BODY MASS INDEX: 20.73 KG/M2 | WEIGHT: 129 LBS

## 2021-03-08 DIAGNOSIS — Z98.890 OTHER SPECIFIED POSTPROCEDURAL STATES: Chronic | ICD-10-CM

## 2021-03-08 DIAGNOSIS — K40.90 UNILATERAL INGUINAL HERNIA, WITHOUT OBSTRUCTION OR GANGRENE, NOT SPECIFIED AS RECURRENT: Chronic | ICD-10-CM

## 2021-03-08 DIAGNOSIS — F33.3 MAJOR DEPRESSIVE DISORDER, RECURRENT, SEVERE WITH PSYCHOTIC SYMPTOMS: ICD-10-CM

## 2021-03-08 PROCEDURE — 99442: CPT | Mod: 95

## 2021-05-03 ENCOUNTER — APPOINTMENT (OUTPATIENT)
Dept: PSYCHIATRY | Facility: CLINIC | Age: 71
End: 2021-05-03
Payer: MEDICARE

## 2021-05-03 ENCOUNTER — OUTPATIENT (OUTPATIENT)
Dept: OUTPATIENT SERVICES | Facility: HOSPITAL | Age: 71
LOS: 1 days | Discharge: HOME | End: 2021-05-03

## 2021-05-03 VITALS — HEIGHT: 66 IN | WEIGHT: 131 LBS | BODY MASS INDEX: 21.05 KG/M2

## 2021-05-03 DIAGNOSIS — F33.3 MAJOR DEPRESSIVE DISORDER, RECURRENT, SEVERE WITH PSYCHOTIC SYMPTOMS: ICD-10-CM

## 2021-05-03 DIAGNOSIS — K40.90 UNILATERAL INGUINAL HERNIA, WITHOUT OBSTRUCTION OR GANGRENE, NOT SPECIFIED AS RECURRENT: Chronic | ICD-10-CM

## 2021-05-03 DIAGNOSIS — Z98.890 OTHER SPECIFIED POSTPROCEDURAL STATES: Chronic | ICD-10-CM

## 2021-05-03 PROCEDURE — 99213 OFFICE O/P EST LOW 20 MIN: CPT | Mod: 95

## 2021-06-28 ENCOUNTER — APPOINTMENT (OUTPATIENT)
Dept: PSYCHIATRY | Facility: CLINIC | Age: 71
End: 2021-06-28
Payer: MEDICARE

## 2021-06-28 ENCOUNTER — OUTPATIENT (OUTPATIENT)
Dept: OUTPATIENT SERVICES | Facility: HOSPITAL | Age: 71
LOS: 1 days | Discharge: HOME | End: 2021-06-28

## 2021-06-28 VITALS — BODY MASS INDEX: 21.05 KG/M2 | HEIGHT: 66 IN | WEIGHT: 131 LBS

## 2021-06-28 DIAGNOSIS — F33.3 MAJOR DEPRESSIVE DISORDER, RECURRENT, SEVERE WITH PSYCHOTIC SYMPTOMS: ICD-10-CM

## 2021-06-28 DIAGNOSIS — Z98.890 OTHER SPECIFIED POSTPROCEDURAL STATES: Chronic | ICD-10-CM

## 2021-06-28 DIAGNOSIS — K40.90 UNILATERAL INGUINAL HERNIA, WITHOUT OBSTRUCTION OR GANGRENE, NOT SPECIFIED AS RECURRENT: Chronic | ICD-10-CM

## 2021-06-28 PROCEDURE — 99213 OFFICE O/P EST LOW 20 MIN: CPT | Mod: 95

## 2021-08-05 ENCOUNTER — RX RENEWAL (OUTPATIENT)
Age: 71
End: 2021-08-05

## 2021-08-23 ENCOUNTER — OUTPATIENT (OUTPATIENT)
Dept: OUTPATIENT SERVICES | Facility: HOSPITAL | Age: 71
LOS: 1 days | Discharge: HOME | End: 2021-08-23

## 2021-08-23 ENCOUNTER — APPOINTMENT (OUTPATIENT)
Dept: PSYCHIATRY | Facility: CLINIC | Age: 71
End: 2021-08-23
Payer: MEDICARE

## 2021-08-23 VITALS — WEIGHT: 131 LBS | BODY MASS INDEX: 21.05 KG/M2 | HEIGHT: 66 IN

## 2021-08-23 DIAGNOSIS — F33.3 MAJOR DEPRESSIVE DISORDER, RECURRENT, SEVERE WITH PSYCHOTIC SYMPTOMS: ICD-10-CM

## 2021-08-23 DIAGNOSIS — Z98.890 OTHER SPECIFIED POSTPROCEDURAL STATES: Chronic | ICD-10-CM

## 2021-08-23 DIAGNOSIS — K40.90 UNILATERAL INGUINAL HERNIA, WITHOUT OBSTRUCTION OR GANGRENE, NOT SPECIFIED AS RECURRENT: Chronic | ICD-10-CM

## 2021-08-23 PROCEDURE — 99213 OFFICE O/P EST LOW 20 MIN: CPT | Mod: 95

## 2021-10-25 ENCOUNTER — APPOINTMENT (OUTPATIENT)
Dept: PSYCHIATRY | Facility: CLINIC | Age: 71
End: 2021-10-25
Payer: MEDICARE

## 2021-10-25 ENCOUNTER — OUTPATIENT (OUTPATIENT)
Dept: OUTPATIENT SERVICES | Facility: HOSPITAL | Age: 71
LOS: 1 days | Discharge: HOME | End: 2021-10-25

## 2021-10-25 VITALS — HEIGHT: 66 IN | BODY MASS INDEX: 21.05 KG/M2 | WEIGHT: 131 LBS

## 2021-10-25 DIAGNOSIS — K40.90 UNILATERAL INGUINAL HERNIA, WITHOUT OBSTRUCTION OR GANGRENE, NOT SPECIFIED AS RECURRENT: Chronic | ICD-10-CM

## 2021-10-25 DIAGNOSIS — F33.3 MAJOR DEPRESSIVE DISORDER, RECURRENT, SEVERE WITH PSYCHOTIC SYMPTOMS: ICD-10-CM

## 2021-10-25 DIAGNOSIS — Z98.890 OTHER SPECIFIED POSTPROCEDURAL STATES: Chronic | ICD-10-CM

## 2021-10-25 PROCEDURE — 99213 OFFICE O/P EST LOW 20 MIN: CPT | Mod: 95

## 2021-10-25 RX ORDER — CLONAZEPAM 0.5 MG/1
0.5 TABLET ORAL TWICE DAILY
Qty: 30 | Refills: 0 | Status: DISCONTINUED | COMMUNITY
Start: 2020-11-19 | End: 2021-10-25

## 2021-12-20 ENCOUNTER — APPOINTMENT (OUTPATIENT)
Dept: PSYCHIATRY | Facility: CLINIC | Age: 71
End: 2021-12-20
Payer: MEDICARE

## 2021-12-20 ENCOUNTER — OUTPATIENT (OUTPATIENT)
Dept: OUTPATIENT SERVICES | Facility: HOSPITAL | Age: 71
LOS: 1 days | Discharge: HOME | End: 2021-12-20

## 2021-12-20 VITALS — WEIGHT: 130 LBS | HEIGHT: 66 IN | BODY MASS INDEX: 20.89 KG/M2

## 2021-12-20 DIAGNOSIS — K40.90 UNILATERAL INGUINAL HERNIA, WITHOUT OBSTRUCTION OR GANGRENE, NOT SPECIFIED AS RECURRENT: Chronic | ICD-10-CM

## 2021-12-20 DIAGNOSIS — Z98.890 OTHER SPECIFIED POSTPROCEDURAL STATES: Chronic | ICD-10-CM

## 2021-12-20 DIAGNOSIS — F33.3 MAJOR DEPRESSIVE DISORDER, RECURRENT, SEVERE WITH PSYCHOTIC SYMPTOMS: ICD-10-CM

## 2021-12-20 PROCEDURE — 99213 OFFICE O/P EST LOW 20 MIN: CPT | Mod: 95

## 2022-02-14 ENCOUNTER — OUTPATIENT (OUTPATIENT)
Dept: OUTPATIENT SERVICES | Facility: HOSPITAL | Age: 72
LOS: 1 days | Discharge: HOME | End: 2022-02-14

## 2022-02-14 ENCOUNTER — APPOINTMENT (OUTPATIENT)
Dept: PSYCHIATRY | Facility: CLINIC | Age: 72
End: 2022-02-14
Payer: MEDICARE

## 2022-02-14 VITALS — BODY MASS INDEX: 21.38 KG/M2 | WEIGHT: 133 LBS | HEIGHT: 66 IN

## 2022-02-14 DIAGNOSIS — Z98.890 OTHER SPECIFIED POSTPROCEDURAL STATES: Chronic | ICD-10-CM

## 2022-02-14 DIAGNOSIS — K40.90 UNILATERAL INGUINAL HERNIA, WITHOUT OBSTRUCTION OR GANGRENE, NOT SPECIFIED AS RECURRENT: Chronic | ICD-10-CM

## 2022-02-14 DIAGNOSIS — F33.3 MAJOR DEPRESSIVE DISORDER, RECURRENT, SEVERE WITH PSYCHOTIC SYMPTOMS: ICD-10-CM

## 2022-02-14 PROCEDURE — 99213 OFFICE O/P EST LOW 20 MIN: CPT | Mod: 95

## 2022-05-09 ENCOUNTER — OUTPATIENT (OUTPATIENT)
Dept: OUTPATIENT SERVICES | Facility: HOSPITAL | Age: 72
LOS: 1 days | Discharge: HOME | End: 2022-05-09

## 2022-05-09 ENCOUNTER — APPOINTMENT (OUTPATIENT)
Dept: PSYCHIATRY | Facility: CLINIC | Age: 72
End: 2022-05-09
Payer: MEDICARE

## 2022-05-09 VITALS — WEIGHT: 135 LBS | BODY MASS INDEX: 21.69 KG/M2 | HEIGHT: 66 IN

## 2022-05-09 DIAGNOSIS — F33.3 MAJOR DEPRESSIVE DISORDER, RECURRENT, SEVERE WITH PSYCHOTIC SYMPTOMS: ICD-10-CM

## 2022-05-09 DIAGNOSIS — Z98.890 OTHER SPECIFIED POSTPROCEDURAL STATES: Chronic | ICD-10-CM

## 2022-05-09 DIAGNOSIS — K40.90 UNILATERAL INGUINAL HERNIA, WITHOUT OBSTRUCTION OR GANGRENE, NOT SPECIFIED AS RECURRENT: Chronic | ICD-10-CM

## 2022-05-09 PROCEDURE — 99213 OFFICE O/P EST LOW 20 MIN: CPT | Mod: 95

## 2022-08-08 ENCOUNTER — APPOINTMENT (OUTPATIENT)
Dept: PSYCHIATRY | Facility: CLINIC | Age: 72
End: 2022-08-08

## 2022-08-08 ENCOUNTER — OUTPATIENT (OUTPATIENT)
Dept: OUTPATIENT SERVICES | Facility: HOSPITAL | Age: 72
LOS: 1 days | Discharge: HOME | End: 2022-08-08

## 2022-08-08 VITALS — WEIGHT: 138 LBS | HEIGHT: 66 IN | BODY MASS INDEX: 22.18 KG/M2

## 2022-08-08 DIAGNOSIS — K40.90 UNILATERAL INGUINAL HERNIA, WITHOUT OBSTRUCTION OR GANGRENE, NOT SPECIFIED AS RECURRENT: Chronic | ICD-10-CM

## 2022-08-08 DIAGNOSIS — Z98.890 OTHER SPECIFIED POSTPROCEDURAL STATES: Chronic | ICD-10-CM

## 2022-08-08 DIAGNOSIS — F33.3 MAJOR DEPRESSIVE DISORDER, RECURRENT, SEVERE WITH PSYCHOTIC SYMPTOMS: ICD-10-CM

## 2022-08-08 PROCEDURE — 99213 OFFICE O/P EST LOW 20 MIN: CPT | Mod: 95

## 2022-10-03 ENCOUNTER — NON-APPOINTMENT (OUTPATIENT)
Age: 72
End: 2022-10-03

## 2022-10-03 NOTE — REASON FOR VISIT
[Other:___] : [unfilled] [FreeTextEntry9] : unknown [FreeTextEntry8] : 10/3/2022 [FreeTextEntry1] : Patient does not want to continue care .  [FreeTextEntry2] : Depression

## 2022-10-03 NOTE — HISTORY OF PRESENT ILLNESS
[FreeTextEntry1] : Patient never seen by writer, as previous provider left clinic before writer had first visit. Pt in treatment for depression with previous provider. Pt no longer wants to continue in treatment and will be discharged.

## 2022-10-24 ENCOUNTER — OUTPATIENT (OUTPATIENT)
Dept: OUTPATIENT SERVICES | Facility: HOSPITAL | Age: 72
LOS: 1 days | Discharge: HOME | End: 2022-10-24

## 2022-10-24 ENCOUNTER — APPOINTMENT (OUTPATIENT)
Dept: PSYCHIATRY | Facility: CLINIC | Age: 72
End: 2022-10-24
Payer: MEDICARE

## 2022-10-24 DIAGNOSIS — Z98.890 OTHER SPECIFIED POSTPROCEDURAL STATES: Chronic | ICD-10-CM

## 2022-10-24 DIAGNOSIS — F33.3 MAJOR DEPRESSIVE DISORDER, RECURRENT, SEVERE WITH PSYCHOTIC SYMPTOMS: ICD-10-CM

## 2022-10-24 DIAGNOSIS — K40.90 UNILATERAL INGUINAL HERNIA, WITHOUT OBSTRUCTION OR GANGRENE, NOT SPECIFIED AS RECURRENT: Chronic | ICD-10-CM

## 2022-10-24 PROCEDURE — 99213 OFFICE O/P EST LOW 20 MIN: CPT | Mod: 95

## 2022-10-24 RX ORDER — CLONAZEPAM 0.5 MG/1
0.5 TABLET ORAL DAILY
Qty: 15 | Refills: 0 | Status: DISCONTINUED | COMMUNITY
Start: 2021-10-25 | End: 2022-10-24

## 2022-12-30 NOTE — DISCUSSION/SUMMARY
[FreeTextEntry1] : Patient's symptoms remain under adequate control with current regimen. There are no acute safety concerns at this time, denies med side effects. \par \par Plan\par \par C/w Paroxetine 40mg dailly, \par    olanzapine 5mg qhs \par F/u in 3 months

## 2022-12-30 NOTE — HISTORY OF PRESENT ILLNESS
[FreeTextEntry1] : First appointment with writer. Pt had wanted to stop treatment however he then decided to continue medication and treatment as he has maintained stability and has been in remission of all symptoms. Sleeping well, denies feeling anxious; no med side effects reported. No acute depressive, anxiety or psychotic symptoms present.

## 2023-01-09 ENCOUNTER — OUTPATIENT (OUTPATIENT)
Dept: OUTPATIENT SERVICES | Facility: HOSPITAL | Age: 73
LOS: 1 days | Discharge: HOME | End: 2023-01-09

## 2023-01-09 ENCOUNTER — APPOINTMENT (OUTPATIENT)
Dept: PSYCHIATRY | Facility: CLINIC | Age: 73
End: 2023-01-09
Payer: MEDICARE

## 2023-01-09 DIAGNOSIS — K40.90 UNILATERAL INGUINAL HERNIA, WITHOUT OBSTRUCTION OR GANGRENE, NOT SPECIFIED AS RECURRENT: Chronic | ICD-10-CM

## 2023-01-09 DIAGNOSIS — F33.3 MAJOR DEPRESSIVE DISORDER, RECURRENT, SEVERE WITH PSYCHOTIC SYMPTOMS: ICD-10-CM

## 2023-01-09 DIAGNOSIS — Z98.890 OTHER SPECIFIED POSTPROCEDURAL STATES: Chronic | ICD-10-CM

## 2023-01-09 PROCEDURE — 99213 OFFICE O/P EST LOW 20 MIN: CPT | Mod: 95

## 2023-01-26 NOTE — DISCUSSION/SUMMARY
[Plan Review] : Plan Review [Adherent to treatment recommendations] : adherent to treatment recommendations [Articulate] : articulate [Motivated to participate in treatment] : motivated to participate in treatment [FreeTextEntry2] : 1/26/24 [FreeTextEntry5] : ongoing remission of symptoms [Mental Health] : Mental Health [Continued - Progress made] : Continued - Progress made: [every ___ months] : every [unfilled] months [Other rationale for transition/discharge:] : Other rationale for transition/discharge: [Yes] : Yes [Psychiatric Provider/Prescriber] : Psychiatric Provider/Prescriber [FreeTextEntry1] : depression with psychotic features [FreeTextEntry4] : ongoing remission of symptoms and functioning at baseline [de-identified] : no further need for medication management [de-identified] : symptoms remain stable

## 2023-01-26 NOTE — HISTORY OF PRESENT ILLNESS
[FreeTextEntry1] : Patient reports he is doing well; denies depressive symptoms; denies feeling anxious. No psychotic symptoms elicited. He is active, goes to the [facility] several times a week. He is sleeping well. No acute safety concerns present.

## 2023-01-26 NOTE — DISCUSSION/SUMMARY
[Plan Review] : Plan Review [Adherent to treatment recommendations] : adherent to treatment recommendations [Articulate] : articulate [Motivated to participate in treatment] : motivated to participate in treatment [FreeTextEntry2] : 1/26/24 [FreeTextEntry5] : ongoing remission of symptoms [Mental Health] : Mental Health [Continued - Progress made] : Continued - Progress made: [every ___ months] : every [unfilled] months [Other rationale for transition/discharge:] : Other rationale for transition/discharge: [Yes] : Yes [Psychiatric Provider/Prescriber] : Psychiatric Provider/Prescriber [FreeTextEntry1] : depression with psychotic features [FreeTextEntry4] : ongoing remission of symptoms and functioning at baseline [de-identified] : symptoms remain stable [de-identified] : no further need for medication management

## 2023-01-26 NOTE — HISTORY OF PRESENT ILLNESS
[FreeTextEntry1] : Patient reports he is doing well; denies depressive symptoms; denies feeling anxious. No psychotic symptoms elicited. He is active, goes to the [facility] several times a week. He is sleeping well. No acute safety concerns present.

## 2023-04-03 ENCOUNTER — OUTPATIENT (OUTPATIENT)
Dept: OUTPATIENT SERVICES | Facility: HOSPITAL | Age: 73
LOS: 1 days | End: 2023-04-03
Payer: MEDICARE

## 2023-04-03 DIAGNOSIS — I65.29 OCCLUSION AND STENOSIS OF UNSPECIFIED CAROTID ARTERY: ICD-10-CM

## 2023-04-03 DIAGNOSIS — Z98.890 OTHER SPECIFIED POSTPROCEDURAL STATES: Chronic | ICD-10-CM

## 2023-04-03 DIAGNOSIS — Z00.8 ENCOUNTER FOR OTHER GENERAL EXAMINATION: ICD-10-CM

## 2023-04-03 DIAGNOSIS — K40.90 UNILATERAL INGUINAL HERNIA, WITHOUT OBSTRUCTION OR GANGRENE, NOT SPECIFIED AS RECURRENT: Chronic | ICD-10-CM

## 2023-04-03 PROCEDURE — 70498 CT ANGIOGRAPHY NECK: CPT | Mod: 26,MH

## 2023-04-03 PROCEDURE — 70498 CT ANGIOGRAPHY NECK: CPT

## 2023-04-04 DIAGNOSIS — I65.29 OCCLUSION AND STENOSIS OF UNSPECIFIED CAROTID ARTERY: ICD-10-CM

## 2023-04-10 ENCOUNTER — APPOINTMENT (OUTPATIENT)
Dept: PSYCHIATRY | Facility: CLINIC | Age: 73
End: 2023-04-10
Payer: MEDICARE

## 2023-04-10 ENCOUNTER — APPOINTMENT (OUTPATIENT)
Dept: PSYCHIATRY | Facility: CLINIC | Age: 73
End: 2023-04-10

## 2023-04-10 ENCOUNTER — OUTPATIENT (OUTPATIENT)
Dept: OUTPATIENT SERVICES | Facility: HOSPITAL | Age: 73
LOS: 1 days | End: 2023-04-10
Payer: MEDICARE

## 2023-04-10 DIAGNOSIS — Z98.890 OTHER SPECIFIED POSTPROCEDURAL STATES: Chronic | ICD-10-CM

## 2023-04-10 DIAGNOSIS — K40.90 UNILATERAL INGUINAL HERNIA, WITHOUT OBSTRUCTION OR GANGRENE, NOT SPECIFIED AS RECURRENT: Chronic | ICD-10-CM

## 2023-04-10 DIAGNOSIS — F19.10 OTHER PSYCHOACTIVE SUBSTANCE ABUSE, UNCOMPLICATED: ICD-10-CM

## 2023-04-10 PROCEDURE — 99214 OFFICE O/P EST MOD 30 MIN: CPT | Mod: 95

## 2023-04-10 RX ORDER — PAROXETINE HYDROCHLORIDE 10 MG/1
10 TABLET, FILM COATED ORAL
Refills: 0 | Status: DISCONTINUED | COMMUNITY
End: 2023-04-10

## 2023-04-10 RX ORDER — PREDNISOLONE ACETATE 10 MG/ML
1 SUSPENSION/ DROPS OPHTHALMIC
Qty: 5 | Refills: 0 | Status: DISCONTINUED | COMMUNITY
Start: 2023-01-31

## 2023-04-10 RX ORDER — NITROFURANTOIN MACROCRYSTALS 100 MG/1
100 CAPSULE ORAL
Qty: 4 | Refills: 0 | Status: DISCONTINUED | COMMUNITY
Start: 2019-01-31 | End: 2023-04-10

## 2023-04-10 RX ORDER — SULFAMETHOXAZOLE AND TRIMETHOPRIM 800; 160 MG/1; MG/1
800-160 TABLET ORAL TWICE DAILY
Qty: 2 | Refills: 0 | Status: DISCONTINUED | COMMUNITY
Start: 2019-01-28 | End: 2023-04-10

## 2023-04-10 NOTE — DISCUSSION/SUMMARY
[FreeTextEntry1] : Pt with hx of psychotic depression.  Presents as stable and asymptomatic on current meds.

## 2023-04-10 NOTE — PLAN
[Medication education provided] : Medication education provided. [Rationale for medication choices, possible risks/precautions, benefits, alternative treatment choices, and consequences of non-treatment discussed] : Rationale for medication choices, possible risks/precautions, benefits, alternative treatment choices, and consequences of non-treatment discussed with patient/family/caregiver  [FreeTextEntry4] : Medication management to prevent return of depressive and psychotic sx. [FreeTextEntry5] : Continue current meds.\par RTC 3 months

## 2023-04-10 NOTE — HISTORY OF PRESENT ILLNESS
[FreeTextEntry1] : First visit with new prescriber.  Chart reviewed and discussed with pt.  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  Pt does not have much recall of his sx at that time.  Says he was "all messed up," but has difficulty naming  many specific sx.  He recalls feeling depressed and losing his appetite.  He says he also lost a significant amount of weight.  He does not recall any sx of psychosis.  Treatment has been very helpful, however, and he has been feeling quite well and stable.  So much so that he was considering coming off of meds.  He says that his children advised him not to, so he will continue. Pt was educated about staying on meds in order to prevent recurrence of sx, and he voiced understanding.  Pt lives alone, and he is able to care for himself.  He goes to the [facility] on weekdays to use the gym and to have lunch.   Pt says mood is good.  Normal sleep and appetite.  No complaints at this time. Total time spent on patient care was 30mins. [FreeTextEntry2] : No hx of IPP treatment.\par Denies any suicidal ideation or attempts.

## 2023-04-10 NOTE — SOCIAL HISTORY
[FreeTextEntry1] : Lives alone.\par . \par Two children and 4 grandchildren - all living in [place].\par Has 1-2 beers a week.

## 2023-04-25 DIAGNOSIS — F33.3 MAJOR DEPRESSIVE DISORDER, RECURRENT, SEVERE WITH PSYCHOTIC SYMPTOMS: ICD-10-CM

## 2023-07-03 ENCOUNTER — OUTPATIENT (OUTPATIENT)
Dept: OUTPATIENT SERVICES | Facility: HOSPITAL | Age: 73
LOS: 1 days | End: 2023-07-03
Payer: MEDICARE

## 2023-07-03 ENCOUNTER — APPOINTMENT (OUTPATIENT)
Dept: PSYCHIATRY | Facility: CLINIC | Age: 73
End: 2023-07-03
Payer: MEDICARE

## 2023-07-03 DIAGNOSIS — F33.3 MAJOR DEPRESSIVE DISORDER, RECURRENT, SEVERE WITH PSYCHOTIC SYMPTOMS: ICD-10-CM

## 2023-07-03 DIAGNOSIS — Z98.890 OTHER SPECIFIED POSTPROCEDURAL STATES: Chronic | ICD-10-CM

## 2023-07-03 DIAGNOSIS — K40.90 UNILATERAL INGUINAL HERNIA, WITHOUT OBSTRUCTION OR GANGRENE, NOT SPECIFIED AS RECURRENT: Chronic | ICD-10-CM

## 2023-07-03 PROCEDURE — 99213 OFFICE O/P EST LOW 20 MIN: CPT | Mod: 95

## 2023-07-03 NOTE — HISTORY OF PRESENT ILLNESS
[FreeTextEntry1] : Pt seen in follow-up.   He reports feeling well.  Mood has been good. Denies any sx of depression.  Normal sleep and appetite.  He continues to go to the gym daily, and attend the senior's program at [facility] several times a week.   No new medical issues.  He says he sees his PCP at least 2x/year.  He had routine blood work last month, and all was normal - including A1c.  Complaint with meds.  Denies any side effects.  No indication for any changes today.  Total time spent on patient care was 20mins. [FreeTextEntry2] : No hx of IPP treatment.\par Denies any suicidal ideation or attempts.

## 2023-07-03 NOTE — DISCUSSION/SUMMARY
[FreeTextEntry1] : Pt with hx of psychotic depression.  Presents as stable and asymptomatic on current meds. [Date of Last Physical Exam: _____] : Date of Last Physical Exam: [unfilled] [Date of Last Annual Labs: _____] : Date of Last Annual Labs: [unfilled] [Date of Last HbgA1c: _____] : Date of Last HbgA1c: [unfilled] [Date of Last Lipid Profile: _____] : Date of Last Lipid Profile: [unfilled]

## 2023-07-04 DIAGNOSIS — F33.3 MAJOR DEPRESSIVE DISORDER, RECURRENT, SEVERE WITH PSYCHOTIC SYMPTOMS: ICD-10-CM

## 2023-10-02 ENCOUNTER — APPOINTMENT (OUTPATIENT)
Dept: PSYCHIATRY | Facility: CLINIC | Age: 73
End: 2023-10-02
Payer: MEDICARE

## 2023-10-02 ENCOUNTER — OUTPATIENT (OUTPATIENT)
Dept: OUTPATIENT SERVICES | Facility: HOSPITAL | Age: 73
LOS: 1 days | End: 2023-10-02
Payer: MEDICARE

## 2023-10-02 DIAGNOSIS — F33.3 MAJOR DEPRESSIVE DISORDER, RECURRENT, SEVERE WITH PSYCHOTIC SYMPTOMS: ICD-10-CM

## 2023-10-02 DIAGNOSIS — Z98.890 OTHER SPECIFIED POSTPROCEDURAL STATES: Chronic | ICD-10-CM

## 2023-10-02 PROCEDURE — 99213 OFFICE O/P EST LOW 20 MIN: CPT | Mod: 95

## 2023-10-03 DIAGNOSIS — F33.3 MAJOR DEPRESSIVE DISORDER, RECURRENT, SEVERE WITH PSYCHOTIC SYMPTOMS: ICD-10-CM

## 2023-10-19 ENCOUNTER — EMERGENCY (EMERGENCY)
Facility: HOSPITAL | Age: 73
LOS: 0 days | Discharge: HOME | End: 2023-10-20
Attending: EMERGENCY MEDICINE
Payer: MEDICARE

## 2023-10-19 VITALS
DIASTOLIC BLOOD PRESSURE: 69 MMHG | SYSTOLIC BLOOD PRESSURE: 182 MMHG | RESPIRATION RATE: 18 BRPM | TEMPERATURE: 98 F | HEART RATE: 65 BPM | WEIGHT: 138.01 LBS | OXYGEN SATURATION: 99 %

## 2023-10-19 DIAGNOSIS — F32.A DEPRESSION, UNSPECIFIED: ICD-10-CM

## 2023-10-19 DIAGNOSIS — F41.9 ANXIETY DISORDER, UNSPECIFIED: ICD-10-CM

## 2023-10-19 DIAGNOSIS — R55 SYNCOPE AND COLLAPSE: ICD-10-CM

## 2023-10-19 DIAGNOSIS — E78.00 PURE HYPERCHOLESTEROLEMIA, UNSPECIFIED: ICD-10-CM

## 2023-10-19 DIAGNOSIS — K40.90 UNILATERAL INGUINAL HERNIA, WITHOUT OBSTRUCTION OR GANGRENE, NOT SPECIFIED AS RECURRENT: Chronic | ICD-10-CM

## 2023-10-19 DIAGNOSIS — Z98.890 OTHER SPECIFIED POSTPROCEDURAL STATES: Chronic | ICD-10-CM

## 2023-10-19 DIAGNOSIS — R42 DIZZINESS AND GIDDINESS: ICD-10-CM

## 2023-10-19 DIAGNOSIS — N40.0 BENIGN PROSTATIC HYPERPLASIA WITHOUT LOWER URINARY TRACT SYMPTOMS: ICD-10-CM

## 2023-10-19 DIAGNOSIS — I35.1 NONRHEUMATIC AORTIC (VALVE) INSUFFICIENCY: ICD-10-CM

## 2023-10-19 DIAGNOSIS — Z79.82 LONG TERM (CURRENT) USE OF ASPIRIN: ICD-10-CM

## 2023-10-19 DIAGNOSIS — I10 ESSENTIAL (PRIMARY) HYPERTENSION: ICD-10-CM

## 2023-10-19 LAB
ALBUMIN SERPL ELPH-MCNC: 4.6 G/DL — SIGNIFICANT CHANGE UP (ref 3.5–5.2)
ALBUMIN SERPL ELPH-MCNC: 4.6 G/DL — SIGNIFICANT CHANGE UP (ref 3.5–5.2)
ALP SERPL-CCNC: 100 U/L — SIGNIFICANT CHANGE UP (ref 30–115)
ALP SERPL-CCNC: 100 U/L — SIGNIFICANT CHANGE UP (ref 30–115)
ALT FLD-CCNC: 19 U/L — SIGNIFICANT CHANGE UP (ref 0–41)
ALT FLD-CCNC: 19 U/L — SIGNIFICANT CHANGE UP (ref 0–41)
ANION GAP SERPL CALC-SCNC: 17 MMOL/L — HIGH (ref 7–14)
ANION GAP SERPL CALC-SCNC: 17 MMOL/L — HIGH (ref 7–14)
AST SERPL-CCNC: 38 U/L — SIGNIFICANT CHANGE UP (ref 0–41)
AST SERPL-CCNC: 38 U/L — SIGNIFICANT CHANGE UP (ref 0–41)
BASOPHILS # BLD AUTO: 0.06 K/UL — SIGNIFICANT CHANGE UP (ref 0–0.2)
BASOPHILS # BLD AUTO: 0.06 K/UL — SIGNIFICANT CHANGE UP (ref 0–0.2)
BASOPHILS NFR BLD AUTO: 0.6 % — SIGNIFICANT CHANGE UP (ref 0–1)
BASOPHILS NFR BLD AUTO: 0.6 % — SIGNIFICANT CHANGE UP (ref 0–1)
BILIRUB SERPL-MCNC: 0.9 MG/DL — SIGNIFICANT CHANGE UP (ref 0.2–1.2)
BILIRUB SERPL-MCNC: 0.9 MG/DL — SIGNIFICANT CHANGE UP (ref 0.2–1.2)
BUN SERPL-MCNC: 23 MG/DL — HIGH (ref 10–20)
BUN SERPL-MCNC: 23 MG/DL — HIGH (ref 10–20)
CALCIUM SERPL-MCNC: 9.6 MG/DL — SIGNIFICANT CHANGE UP (ref 8.4–10.5)
CALCIUM SERPL-MCNC: 9.6 MG/DL — SIGNIFICANT CHANGE UP (ref 8.4–10.5)
CHLORIDE SERPL-SCNC: 103 MMOL/L — SIGNIFICANT CHANGE UP (ref 98–110)
CHLORIDE SERPL-SCNC: 103 MMOL/L — SIGNIFICANT CHANGE UP (ref 98–110)
CO2 SERPL-SCNC: 18 MMOL/L — SIGNIFICANT CHANGE UP (ref 17–32)
CO2 SERPL-SCNC: 18 MMOL/L — SIGNIFICANT CHANGE UP (ref 17–32)
CREAT SERPL-MCNC: 1.3 MG/DL — SIGNIFICANT CHANGE UP (ref 0.7–1.5)
CREAT SERPL-MCNC: 1.3 MG/DL — SIGNIFICANT CHANGE UP (ref 0.7–1.5)
EGFR: 58 ML/MIN/1.73M2 — LOW
EGFR: 58 ML/MIN/1.73M2 — LOW
EOSINOPHIL # BLD AUTO: 0.02 K/UL — SIGNIFICANT CHANGE UP (ref 0–0.7)
EOSINOPHIL # BLD AUTO: 0.02 K/UL — SIGNIFICANT CHANGE UP (ref 0–0.7)
EOSINOPHIL NFR BLD AUTO: 0.2 % — SIGNIFICANT CHANGE UP (ref 0–8)
EOSINOPHIL NFR BLD AUTO: 0.2 % — SIGNIFICANT CHANGE UP (ref 0–8)
GLUCOSE SERPL-MCNC: 109 MG/DL — HIGH (ref 70–99)
GLUCOSE SERPL-MCNC: 109 MG/DL — HIGH (ref 70–99)
HCT VFR BLD CALC: 35.7 % — LOW (ref 42–52)
HCT VFR BLD CALC: 35.7 % — LOW (ref 42–52)
HGB BLD-MCNC: 12 G/DL — LOW (ref 14–18)
HGB BLD-MCNC: 12 G/DL — LOW (ref 14–18)
IMM GRANULOCYTES NFR BLD AUTO: 0.5 % — HIGH (ref 0.1–0.3)
IMM GRANULOCYTES NFR BLD AUTO: 0.5 % — HIGH (ref 0.1–0.3)
LYMPHOCYTES # BLD AUTO: 1.84 K/UL — SIGNIFICANT CHANGE UP (ref 1.2–3.4)
LYMPHOCYTES # BLD AUTO: 1.84 K/UL — SIGNIFICANT CHANGE UP (ref 1.2–3.4)
LYMPHOCYTES # BLD AUTO: 17.7 % — LOW (ref 20.5–51.1)
LYMPHOCYTES # BLD AUTO: 17.7 % — LOW (ref 20.5–51.1)
MCHC RBC-ENTMCNC: 30 PG — SIGNIFICANT CHANGE UP (ref 27–31)
MCHC RBC-ENTMCNC: 30 PG — SIGNIFICANT CHANGE UP (ref 27–31)
MCHC RBC-ENTMCNC: 33.6 G/DL — SIGNIFICANT CHANGE UP (ref 32–37)
MCHC RBC-ENTMCNC: 33.6 G/DL — SIGNIFICANT CHANGE UP (ref 32–37)
MCV RBC AUTO: 89.3 FL — SIGNIFICANT CHANGE UP (ref 80–94)
MCV RBC AUTO: 89.3 FL — SIGNIFICANT CHANGE UP (ref 80–94)
MONOCYTES # BLD AUTO: 0.82 K/UL — HIGH (ref 0.1–0.6)
MONOCYTES # BLD AUTO: 0.82 K/UL — HIGH (ref 0.1–0.6)
MONOCYTES NFR BLD AUTO: 7.9 % — SIGNIFICANT CHANGE UP (ref 1.7–9.3)
MONOCYTES NFR BLD AUTO: 7.9 % — SIGNIFICANT CHANGE UP (ref 1.7–9.3)
NEUTROPHILS # BLD AUTO: 7.6 K/UL — HIGH (ref 1.4–6.5)
NEUTROPHILS # BLD AUTO: 7.6 K/UL — HIGH (ref 1.4–6.5)
NEUTROPHILS NFR BLD AUTO: 73.1 % — SIGNIFICANT CHANGE UP (ref 42.2–75.2)
NEUTROPHILS NFR BLD AUTO: 73.1 % — SIGNIFICANT CHANGE UP (ref 42.2–75.2)
NRBC # BLD: 0 /100 WBCS — SIGNIFICANT CHANGE UP (ref 0–0)
NRBC # BLD: 0 /100 WBCS — SIGNIFICANT CHANGE UP (ref 0–0)
PLATELET # BLD AUTO: 266 K/UL — SIGNIFICANT CHANGE UP (ref 130–400)
PLATELET # BLD AUTO: 266 K/UL — SIGNIFICANT CHANGE UP (ref 130–400)
PMV BLD: 10.7 FL — HIGH (ref 7.4–10.4)
PMV BLD: 10.7 FL — HIGH (ref 7.4–10.4)
POTASSIUM SERPL-MCNC: 4.5 MMOL/L — SIGNIFICANT CHANGE UP (ref 3.5–5)
POTASSIUM SERPL-MCNC: 4.5 MMOL/L — SIGNIFICANT CHANGE UP (ref 3.5–5)
POTASSIUM SERPL-SCNC: 4.5 MMOL/L — SIGNIFICANT CHANGE UP (ref 3.5–5)
POTASSIUM SERPL-SCNC: 4.5 MMOL/L — SIGNIFICANT CHANGE UP (ref 3.5–5)
PROT SERPL-MCNC: 7.4 G/DL — SIGNIFICANT CHANGE UP (ref 6–8)
PROT SERPL-MCNC: 7.4 G/DL — SIGNIFICANT CHANGE UP (ref 6–8)
RBC # BLD: 4 M/UL — LOW (ref 4.7–6.1)
RBC # BLD: 4 M/UL — LOW (ref 4.7–6.1)
RBC # FLD: 13.2 % — SIGNIFICANT CHANGE UP (ref 11.5–14.5)
RBC # FLD: 13.2 % — SIGNIFICANT CHANGE UP (ref 11.5–14.5)
SODIUM SERPL-SCNC: 138 MMOL/L — SIGNIFICANT CHANGE UP (ref 135–146)
SODIUM SERPL-SCNC: 138 MMOL/L — SIGNIFICANT CHANGE UP (ref 135–146)
TROPONIN T SERPL-MCNC: <0.01 NG/ML — SIGNIFICANT CHANGE UP
WBC # BLD: 10.39 K/UL — SIGNIFICANT CHANGE UP (ref 4.8–10.8)
WBC # BLD: 10.39 K/UL — SIGNIFICANT CHANGE UP (ref 4.8–10.8)
WBC # FLD AUTO: 10.39 K/UL — SIGNIFICANT CHANGE UP (ref 4.8–10.8)
WBC # FLD AUTO: 10.39 K/UL — SIGNIFICANT CHANGE UP (ref 4.8–10.8)

## 2023-10-19 PROCEDURE — 93005 ELECTROCARDIOGRAM TRACING: CPT

## 2023-10-19 PROCEDURE — 99285 EMERGENCY DEPT VISIT HI MDM: CPT | Mod: 25

## 2023-10-19 PROCEDURE — 84484 ASSAY OF TROPONIN QUANT: CPT

## 2023-10-19 PROCEDURE — 93306 TTE W/DOPPLER COMPLETE: CPT

## 2023-10-19 PROCEDURE — 71045 X-RAY EXAM CHEST 1 VIEW: CPT | Mod: 26

## 2023-10-19 PROCEDURE — 99223 1ST HOSP IP/OBS HIGH 75: CPT | Mod: GC

## 2023-10-19 PROCEDURE — 80053 COMPREHEN METABOLIC PANEL: CPT

## 2023-10-19 PROCEDURE — 36415 COLL VENOUS BLD VENIPUNCTURE: CPT

## 2023-10-19 PROCEDURE — 93010 ELECTROCARDIOGRAM REPORT: CPT

## 2023-10-19 PROCEDURE — 85025 COMPLETE CBC W/AUTO DIFF WBC: CPT

## 2023-10-19 PROCEDURE — 71045 X-RAY EXAM CHEST 1 VIEW: CPT

## 2023-10-19 PROCEDURE — G0378: CPT

## 2023-10-19 RX ORDER — ASPIRIN/CALCIUM CARB/MAGNESIUM 324 MG
81 TABLET ORAL DAILY
Refills: 0 | Status: DISCONTINUED | OUTPATIENT
Start: 2023-10-19 | End: 2023-10-20

## 2023-10-19 RX ORDER — TAMSULOSIN HYDROCHLORIDE 0.4 MG/1
0.4 CAPSULE ORAL AT BEDTIME
Refills: 0 | Status: DISCONTINUED | OUTPATIENT
Start: 2023-10-19 | End: 2023-10-20

## 2023-10-19 RX ORDER — LOSARTAN POTASSIUM 100 MG/1
100 TABLET, FILM COATED ORAL DAILY
Refills: 0 | Status: DISCONTINUED | OUTPATIENT
Start: 2023-10-19 | End: 2023-10-20

## 2023-10-19 RX ORDER — ATORVASTATIN CALCIUM 80 MG/1
10 TABLET, FILM COATED ORAL AT BEDTIME
Refills: 0 | Status: DISCONTINUED | OUTPATIENT
Start: 2023-10-19 | End: 2023-10-20

## 2023-10-19 RX ORDER — OLANZAPINE 15 MG/1
5 TABLET, FILM COATED ORAL DAILY
Refills: 0 | Status: DISCONTINUED | OUTPATIENT
Start: 2023-10-19 | End: 2023-10-20

## 2023-10-19 RX ADMIN — LOSARTAN POTASSIUM 100 MILLIGRAM(S): 100 TABLET, FILM COATED ORAL at 22:07

## 2023-10-19 RX ADMIN — Medication 30 MILLIGRAM(S): at 22:07

## 2023-10-19 RX ADMIN — OLANZAPINE 5 MILLIGRAM(S): 15 TABLET, FILM COATED ORAL at 22:07

## 2023-10-19 RX ADMIN — TAMSULOSIN HYDROCHLORIDE 0.4 MILLIGRAM(S): 0.4 CAPSULE ORAL at 22:07

## 2023-10-19 RX ADMIN — ATORVASTATIN CALCIUM 10 MILLIGRAM(S): 80 TABLET, FILM COATED ORAL at 22:07

## 2023-10-19 RX ADMIN — Medication 81 MILLIGRAM(S): at 22:07

## 2023-10-19 NOTE — ED ADULT TRIAGE NOTE - CHIEF COMPLAINT QUOTE
BIBEMS from pt's gym for a syncopal episode during exercise.  en route. BIBEMS from pt's gym for a syncopal episode during exercise.  en route. Has h/o multiple syncopal episodes.

## 2023-10-19 NOTE — ED PROVIDER NOTE - OBJECTIVE STATEMENT
Patient is 72-year-old male past medical history of hypertension and anxiety presenting to ED status post near syncope episode while at the gym.  Patient states he was doing an exercise when he suddenly felt "lightheaded and shaky" fell landing on his side.  Denies any head trauma or anticoagulation no loss of consciousness or injury.  Patient has no complaints at this time. Otherwise denies any fever, chills, headache, changes in vision, cough, congestion, cp, palpitations, sob, n/v/d, abd pain, constipation, urinary complaints, lower extremity pain/swelling.

## 2023-10-19 NOTE — ED ADULT NURSE NOTE - CHIEF COMPLAINT QUOTE
BIBEMS from pt's gym for a syncopal episode during exercise.  en route. Has h/o multiple syncopal episodes.

## 2023-10-19 NOTE — ED ADULT TRIAGE NOTE - BEFAST BALANCE
Quality 226: Preventive Care And Screening: Tobacco Use: Screening And Cessation Intervention: Patient screened for tobacco use and is an ex/non-smoker Quality 431: Preventive Care And Screening: Unhealthy Alcohol Use - Screening: Patient screened for unhealthy alcohol use using a single question and scores less than 2 times per year Detail Level: Detailed Quality 110: Preventive Care And Screening: Influenza Immunization: Influenza Immunization Administered during Influenza season Quality 130: Documentation Of Current Medications In The Medical Record: Current Medications Documented No

## 2023-10-19 NOTE — ED CDU PROVIDER INITIAL DAY NOTE - NSICDXPASTMEDICALHX_GEN_ALL_CORE_FT
PAST MEDICAL HISTORY:  Anxiety     BPH (benign prostatic hyperplasia)     Depression     Hypercholesteremia     Hypertension     Skin cancer

## 2023-10-19 NOTE — ED CDU PROVIDER INITIAL DAY NOTE - CLINICAL SUMMARY MEDICAL DECISION MAKING FREE TEXT BOX
72 y.o. male, PMH of HTN, anxiety, presenting to ED s/p near syncopal episode while at the gym.  Patient states he was doing an exercise when he suddenly felt "lightheaded and shaky", fell landing on his side.  Denies any head trauma or anticoagulation. No loss of consciousness or injury.  Patient has no complaints at this time. Otherwise denies any fever/chills, headache, changes in vision, cough, congestion, CP, palpitations, SOB, n/v/d, abd pain, constipation, urinary complaints, lower extremity pain/swelling. On exam, pt in NAD, AAOx3, head NC/AT, CN II-XII intact, PEERL, EOMi, neck (-) midline tenderness, lungs CTA B/L, CV S1S2 regular, abdomen soft/NT/ND/(+)BS, ext (-) edema, motor 5/5x4, sensation intact, ambulating with steady gait. Pt placed in obs for echo.

## 2023-10-19 NOTE — ED ADULT TRIAGE NOTE - TEMPERATURE IN FAHRENHEIT (DEGREES F)
Patient to apply Chlorhexadine wipes  to surgical area (as instructed) the night before procedure and the AM of procedure. Wipes provided.   98

## 2023-10-19 NOTE — ED PROVIDER NOTE - CLINICAL SUMMARY MEDICAL DECISION MAKING FREE TEXT BOX
72-year-old male history hypertension BPH presenting for evaluation of presyncopal episode today.  Positive fall, no head injury/other injuries.  Per patient, this has happened to him prior, has never been evaluated for the symptoms.  No chest pain, shortness of breath, currently feeling improved.  No numbness/focal weakness, headache, vision changes.  Well appearing, NAD, non toxic. NCAT PERRLA EOMI neck supple non tender normal wob cta bl rrr abdomen s nt nd no rebound no guarding WWPx4 neuro non focal.  Labs EKG imaging reviewed.  OBS for further evaluation.

## 2023-10-19 NOTE — ED CDU PROVIDER INITIAL DAY NOTE - PHYSICAL EXAMINATION
VITAL SIGNS: I have reviewed nursing notes and confirm.  CONSTITUTIONAL: non-toxic, well appearing  SKIN: no rash, no petechiae.  EYES: PERRL, EOMI, pink conjunctiva, anicteric  ENT: tongue midline, no exudates, MMM  NECK: Supple; no meningismus, no JVD  CARD: RRR, no murmurs, equal radial pulses bilaterally 2+  RESP: CTAB, no respiratory distress  ABD: Soft, non-tender  EXT: Normal ROM x4. No edema.   NEURO: Alert, oriented. no focal deficits

## 2023-10-19 NOTE — ED CDU PROVIDER INITIAL DAY NOTE - WET READ LAUNCH FT
----- Message from Chandler Martin sent at 4/27/2023 11:09 AM CDT -----  Contact: [phone] @ [facility]  Good Morning patient son would like to know if papers was filled out for his mother and when could he picked them up    
Done.  
Informed Mr. Leija forms are ready for pick. He stated he will be here to pick them up.  
MR Leija is asking  about the status of FMLA forms   
There are no Wet Read(s) to document.

## 2023-10-19 NOTE — ED ADULT NURSE REASSESSMENT NOTE - NS ED NURSE REASSESS COMMENT FT1
Patient denied any chest pain or any other discomfort at this time . Plan of care explained to patient and family

## 2023-10-19 NOTE — ED CDU PROVIDER INITIAL DAY NOTE - OBJECTIVE STATEMENT
72-year-old male past medical history of hypertension and anxiety presenting to ED status post near syncope episode while at the gym.  Patient states he was doing an exercise when he suddenly felt "lightheaded and shaky" fell landing on his side.  Denies any head trauma or anticoagulation no loss of consciousness or injury.  Patient has no complaints at this time. Otherwise denies any fever, chills, headache, changes in vision, cough, congestion, cp, palpitations, sob, n/v/d, abd pain, constipation, urinary complaints, lower extremity pain/swelling.

## 2023-10-20 VITALS
HEART RATE: 63 BPM | RESPIRATION RATE: 18 BRPM | DIASTOLIC BLOOD PRESSURE: 58 MMHG | OXYGEN SATURATION: 99 % | SYSTOLIC BLOOD PRESSURE: 141 MMHG

## 2023-10-20 PROCEDURE — 93306 TTE W/DOPPLER COMPLETE: CPT | Mod: 26

## 2023-10-20 PROCEDURE — 99239 HOSP IP/OBS DSCHRG MGMT >30: CPT | Mod: GC

## 2023-10-20 RX ADMIN — LOSARTAN POTASSIUM 100 MILLIGRAM(S): 100 TABLET, FILM COATED ORAL at 06:03

## 2023-10-20 NOTE — ED CDU PROVIDER SUBSEQUENT DAY NOTE - PROGRESS NOTE DETAILS
TTE with moderate aortic regurgitation, but otherwise unremarkable (including EF of 55%).   Discussed with Cardiology Fellow; will review the TTE and provide recs. Cards Fellow reviewed images, deferred disposition to the patient's Cardiologist Dr. Souza.   Reached out to Dr. Souza's office via [phone] and [phone], left message at reception; sent a message to Dr. Souza via Teams as well.     Spoke with Dr. Souza's NP Resident WILY Pereira: Spoke with Dr. Souza - agreed with outpatient management, and advised for the patient to see him in the office next week. No heavy exertion or driving until then.   Discussed results and plan with the patient's son Hugo ([phone]).

## 2023-10-20 NOTE — ED ADULT NURSE REASSESSMENT NOTE - NS ED NURSE REASSESS COMMENT FT1
Received pt from previous RN A/O times 4 Vs stable on cardiac monitor denies chest pain denies SOB no N/V no dizziness on the bed assistance with ADL, breakfast tray provide  did eat 75% ,VD order is dine Ed attending made aware of patient status on going nursing observation .

## 2023-10-20 NOTE — ED CDU PROVIDER DISPOSITION NOTE - NSFOLLOWUPINSTRUCTIONS_ED_ALL_ED_FT
Aortic Regurgitation    What is aortic regurgitation? Aortic regurgitation is a condition that causes blood to flow through the aortic valve to your left ventricle. This happens because the aortic valve does not close properly. The aortic valve is between the left ventricle and the aorta. The aorta is a blood vessel that pumps blood to your body. The aortic valve opens and closes to direct blood from your left ventricle to your aorta.    What increases my risk for aortic regurgitation? Aortic regurgitation is caused by damage to or weakness of the aortic valve. Any of the following may increase your risk for these problems:  Older age  Being born with heart problems  Rheumatic fever or infection of the aortic valve  High blood pressure or heart attack  Injury to the aortic valve from trauma, a heart procedure, or radiation  A medical condition such as Marfan syndrome    Certain medicines, such as pills that help you lose weight  What are the signs and symptoms of aortic regurgitation? You may not have symptoms, or you may have any of the following:    Weakness or fatigue  Shortness of breath that gets worse during activity or when you lie down  Chest pain or tightness  Dizziness or feeling faint  A fast heartbeat or feeling your heart flutter  Swollen feet or ankles    How is aortic regurgitation diagnosed? Your healthcare provider will examine you and listen to your heart. He or she will ask if you have had strep throat or rheumatic fever. Tell your provider if you have a family history of heart disease. You may need any of the following tests:    Blood tests may be used to check for an infection or other cause of your aortic regurgitation.    An echocardiogram is a type of ultrasound. It is used to show problems with your aortic valve and how blood flows through your heart. It may also show how well your heart is pumping. You may need a transthoracic or transesophageal echocardiogram. Ask your healthcare provider about these types of echocardiogram.    X-ray, CT, or MRI pictures show the size of your heart and look for fluid around your heart or lungs. They also look for problems with your valve or aorta. You may be given contrast liquid to help the heart and lungs show up better in the pictures. Tell the healthcare provider if you have ever had an allergic reaction to contrast liquid. Do not enter the MRI room with anything metal. Metal can cause serious injury. Tell the healthcare provider if you have any metal in or on your body.    An EKG test records the electrical activity of your heart. It is used to check for an abnormal heart rhythm.    A stress test helps healthcare providers see how well your aortic valve works under stress. Healthcare providers may place stress on your aortic valve with exercise or medicine.    Cardiac catheterization is a procedure to check how well your heart is pumping blood. It is also used to measure pressure in different parts of your heart. A catheter (long thin tube) is inserted into your arm, neck, or groin and moved into your heart. An x-ray may be used to guide the tube to the right place. Contrast liquid may be used to help your heart show up better in the pictures. Tell the healthcare provider if you have ever had an allergic reaction to contrast liquid.  How is aortic regurgitation treated? Treatment may not be needed if your condition does not cause symptoms.    Medicine may be given to lower your blood pressure, decrease stress on your heart, or remove extra fluid.    Surgery may be used to repair or replace your aortic valve if you have severe symptoms.  How can I manage aortic regurgitation?    Maintain a healthy weight. Being overweight can increase your risk for high blood pressure and coronary artery disease. These conditions can make your heart work harder. Ask your healthcare provider what a healthy weight is for you. Ask him or her to help you create a weight loss plan if you are overweight.    Do not smoke. Nicotine and other chemicals in cigarettes and cigars can cause lung and heart damage. Ask your healthcare provider for information if you currently smoke and need help to quit. E-cigarettes or smokeless tobacco still contain nicotine. Talk to your healthcare provider before you use these products.    Limit or do not drink alcohol. Ask your healthcare provider if it is okay for you to drink alcohol. Alcohol can increase your risk for high blood pressure and coronary artery disease. Your provider can tell you how many drinks are okay to have within 24 hours or within 1 week. A drink of alcohol is 12 ounces of beer, 5 ounces of wine, or 1½ ounces of liquor.    Eat heart-healthy foods. Heart-healthy foods include salmon, tuna, walnuts, whole-grain breads, low-fat dairy products, beans, and oils such as olive or canola oil. A dietitian or your provider can give you more information on meal plans such as the DASH (Dietary Approaches to Stop Hypertension) eating plan. The DASH plan is low in sodium, processed sugar, unhealthy fats, and total fat. It is high in potassium, calcium, and fiber. These can be found in vegetables, fruit, and whole-grain foods.    Limit sodium (salt) as directed. Too much sodium can affect your fluid balance. Check labels to find low-sodium or no-salt-added foods. You can also make small changes to get less salt. For example, if you add salt while you cook, do not add more salt at the table. Ask your healthcare provider or dietitian for more ways to cut down on salt.    Exercise as directed. Exercise can help keep your heart healthy. Ask your healthcare provider what activities are safe for you to do. The amount and type of exercise that is safe may depend on how severe your condition is.    Talk to your healthcare provider about pregnancy. If you are a woman and want to get pregnant, talk to your healthcare provider. You and your baby may need to be monitored by specialists during your pregnancy.    Ask your healthcare provider if you should take antibiotics before certain procedures. Some procedures may allow bacteria to get into your blood and travel to your heart. This can make your condition worse.    Ask about vaccines you may need. Certain diseases are dangerous for a person who has aortic regurgitation. Vaccines help prevent infections that can cause some diseases. Get a flu vaccine as soon as recommended each year, usually in September or October. Your healthcare provider can tell you if you also need other vaccines, and when to get them.    What can I do to prevent aortic regurgitation?    Manage other health conditions. High blood pressure and high cholesterol levels increase your risk for aortic regurgitation. Ask your healthcare provider for more information on managing these conditions.    Get treatment for strep throat. If strep throat is not treated, it can cause rheumatic fever.    Talk to your healthcare provider before you take any new medicine. Some medicines can cause aortic regurgitation.    Call your local emergency number (911 in the [place]) or have someone call if:    You have any of the following signs of a heart attack:  Squeezing, pressure, or pain in your chest    You may also have any of the following:  Discomfort or pain in your back, neck, jaw, stomach, or arm  Shortness of breath  Nausea or vomiting  Lightheadedness or a sudden cold sweat    When should I seek immediate care?  Your heart is beating faster than usual and you feel fluttering in your chest.  You have new or worse swelling in your abdomen, legs, ankles, or feet.    When should I call my doctor?  You have a fever.  You feel more tired than usual.  You are more short of breath than usual when you exercise or lie down.  You cough more than usual, especially when you lie down.  You are pregnant or think you are pregnant.  You have questions or concerns about your condition or care. Aortic Regurgitation    What is aortic regurgitation? Aortic regurgitation is a condition that causes blood to flow through the aortic valve to your left ventricle. This happens because the aortic valve does not close properly. The aortic valve is between the left ventricle and the aorta. The aorta is a blood vessel that pumps blood to your body. The aortic valve opens and closes to direct blood from your left ventricle to your aorta.    What increases my risk for aortic regurgitation? Aortic regurgitation is caused by damage to or weakness of the aortic valve. Any of the following may increase your risk for these problems:  Older age  Being born with heart problems  Rheumatic fever or infection of the aortic valve  High blood pressure or heart attack  Injury to the aortic valve from trauma, a heart procedure, or radiation  A medical condition such as Marfan syndrome  Certain medicines, such as pills that help you lose weight    What are the signs and symptoms of aortic regurgitation? You may not have symptoms, or you may have any of the following:  Weakness or fatigue  Shortness of breath that gets worse during activity or when you lie down  Chest pain or tightness  Dizziness or feeling faint  A fast heartbeat or feeling your heart flutter  Swollen feet or ankles    How is aortic regurgitation diagnosed? Your healthcare provider will examine you and listen to your heart. He or she will ask if you have had strep throat or rheumatic fever. Tell your provider if you have a family history of heart disease. You may need any of the following tests:    Blood tests may be used to check for an infection or other cause of your aortic regurgitation.    An echocardiogram is a type of ultrasound. It is used to show problems with your aortic valve and how blood flows through your heart. It may also show how well your heart is pumping. You may need a transthoracic or transesophageal echocardiogram. Ask your healthcare provider about these types of echocardiogram.    X-ray, CT, or MRI pictures show the size of your heart and look for fluid around your heart or lungs. They also look for problems with your valve or aorta. You may be given contrast liquid to help the heart and lungs show up better in the pictures. Tell the healthcare provider if you have ever had an allergic reaction to contrast liquid. Do not enter the MRI room with anything metal. Metal can cause serious injury. Tell the healthcare provider if you have any metal in or on your body.    An EKG test records the electrical activity of your heart. It is used to check for an abnormal heart rhythm.    A stress test helps healthcare providers see how well your aortic valve works under stress. Healthcare providers may place stress on your aortic valve with exercise or medicine.    Cardiac catheterization is a procedure to check how well your heart is pumping blood. It is also used to measure pressure in different parts of your heart. A catheter (long thin tube) is inserted into your arm, neck, or groin and moved into your heart. An x-ray may be used to guide the tube to the right place. Contrast liquid may be used to help your heart show up better in the pictures. Tell the healthcare provider if you have ever had an allergic reaction to contrast liquid.    How is aortic regurgitation treated? Treatment may not be needed if your condition does not cause symptoms.    Medicine may be given to lower your blood pressure, decrease stress on your heart, or remove extra fluid.    Surgery may be used to repair or replace your aortic valve if you have severe symptoms.    How can I manage aortic regurgitation?    Maintain a healthy weight. Being overweight can increase your risk for high blood pressure and coronary artery disease. These conditions can make your heart work harder. Ask your healthcare provider what a healthy weight is for you. Ask him or her to help you create a weight loss plan if you are overweight.    Do not smoke. Nicotine and other chemicals in cigarettes and cigars can cause lung and heart damage. Ask your healthcare provider for information if you currently smoke and need help to quit. E-cigarettes or smokeless tobacco still contain nicotine. Talk to your healthcare provider before you use these products.    Limit or do not drink alcohol. Ask your healthcare provider if it is okay for you to drink alcohol. Alcohol can increase your risk for high blood pressure and coronary artery disease. Your provider can tell you how many drinks are okay to have within 24 hours or within 1 week. A drink of alcohol is 12 ounces of beer, 5 ounces of wine, or 1½ ounces of liquor.    Eat heart-healthy foods. Heart-healthy foods include salmon, tuna, walnuts, whole-grain breads, low-fat dairy products, beans, and oils such as olive or canola oil. A dietitian or your provider can give you more information on meal plans such as the DASH (Dietary Approaches to Stop Hypertension) eating plan. The DASH plan is low in sodium, processed sugar, unhealthy fats, and total fat. It is high in potassium, calcium, and fiber. These can be found in vegetables, fruit, and whole-grain foods.    Limit sodium (salt) as directed. Too much sodium can affect your fluid balance. Check labels to find low-sodium or no-salt-added foods. You can also make small changes to get less salt. For example, if you add salt while you cook, do not add more salt at the table. Ask your healthcare provider or dietitian for more ways to cut down on salt.    Exercise as directed. Exercise can help keep your heart healthy. Ask your healthcare provider what activities are safe for you to do. The amount and type of exercise that is safe may depend on how severe your condition is.    Talk to your healthcare provider about pregnancy. If you are a woman and want to get pregnant, talk to your healthcare provider. You and your baby may need to be monitored by specialists during your pregnancy.    Ask your healthcare provider if you should take antibiotics before certain procedures. Some procedures may allow bacteria to get into your blood and travel to your heart. This can make your condition worse.    Ask about vaccines you may need. Certain diseases are dangerous for a person who has aortic regurgitation. Vaccines help prevent infections that can cause some diseases. Get a flu vaccine as soon as recommended each year, usually in September or October. Your healthcare provider can tell you if you also need other vaccines, and when to get them.    What can I do to prevent aortic regurgitation?    Manage other health conditions. High blood pressure and high cholesterol levels increase your risk for aortic regurgitation. Ask your healthcare provider for more information on managing these conditions.    Get treatment for strep throat. If strep throat is not treated, it can cause rheumatic fever.    Talk to your healthcare provider before you take any new medicine. Some medicines can cause aortic regurgitation.    Call your local emergency number (911 in the [place]) or have someone call if:  You have any of the following signs of a heart attack:  Squeezing, pressure, or pain in your chest  You may also have any of the following:  Discomfort or pain in your back, neck, jaw, stomach, or arm  Shortness of breath  Nausea or vomiting  Lightheadedness or a sudden cold sweat    When should I seek immediate care?  Your heart is beating faster than usual and you feel fluttering in your chest.  You have new or worse swelling in your abdomen, legs, ankles, or feet.    When should I call my doctor?  You have a fever.  You feel more tired than usual.  You are more short of breath than usual when you exercise or lie down.  You cough more than usual, especially when you lie down.  You are pregnant or think you are pregnant.  You have questions or concerns about your condition or care. Please see your Cardiologist Dr. Florencia Souza next week.   Until then, avoid working out and no driving.   Stay well hydrated.   ------------------------------------------------------------------------------------------------------------------------  Aortic Regurgitation    What is aortic regurgitation? Aortic regurgitation is a condition that causes blood to flow through the aortic valve to your left ventricle. This happens because the aortic valve does not close properly. The aortic valve is between the left ventricle and the aorta. The aorta is a blood vessel that pumps blood to your body. The aortic valve opens and closes to direct blood from your left ventricle to your aorta.    What increases my risk for aortic regurgitation? Aortic regurgitation is caused by damage to or weakness of the aortic valve. Any of the following may increase your risk for these problems:  - Older age  - Being born with heart problems  - Rheumatic fever or infection of the aortic valve  - High blood pressure or heart attack  - Injury to the aortic valve from trauma, a heart procedure, or radiation  - A medical condition such as Marfan syndrome  - Certain medicines, such as pills that help you lose weight    What are the signs and symptoms of aortic regurgitation? You may not have symptoms, or you may have any of the following:  - Weakness or fatigue  - Shortness of breath that gets worse during activity or when you lie down  - Chest pain or tightness  - Dizziness or feeling faint  - A fast heartbeat or feeling your heart flutter  - Swollen feet or ankles    How is aortic regurgitation diagnosed? Your healthcare provider will examine you and listen to your heart. He or she will ask if you have had strep throat or rheumatic fever. Tell your provider if you have a family history of heart disease. You may need any of the following tests:    - Blood tests may be used to check for an infection or other cause of your aortic regurgitation.    - An echocardiogram is a type of ultrasound. It is used to show problems with your aortic valve and how blood flows through your heart. It may also show how well your heart is pumping. You may need a transthoracic or transesophageal echocardiogram. Ask your healthcare provider about these types of echocardiogram.    - X-ray, CT, or MRI pictures show the size of your heart and look for fluid around your heart or lungs. They also look for problems with your valve or aorta. You may be given contrast liquid to help the heart and lungs show up better in the pictures. Tell the healthcare provider if you have ever had an allergic reaction to contrast liquid. Do not enter the MRI room with anything metal. Metal can cause serious injury. Tell the healthcare provider if you have any metal in or on your body.    - An EKG test records the electrical activity of your heart. It is used to check for an abnormal heart rhythm.    - A stress test helps healthcare providers see how well your aortic valve works under stress. Healthcare providers may place stress on your aortic valve with exercise or medicine.    - Cardiac catheterization is a procedure to check how well your heart is pumping blood. It is also used to measure pressure in different parts of your heart. A catheter (long thin tube) is inserted into your arm, neck, or groin and moved into your heart. An x-ray may be used to guide the tube to the right place. Contrast liquid may be used to help your heart show up better in the pictures. Tell the healthcare provider if you have ever had an allergic reaction to contrast liquid.    How is aortic regurgitation treated?   - Treatment may not be needed if your condition does not cause symptoms.  - Medicine may be given to lower your blood pressure, decrease stress on your heart, or remove extra fluid.  - Surgery may be used to repair or replace your aortic valve if you have severe symptoms.    How can I manage aortic regurgitation?    - Maintain a healthy weight. Being overweight can increase your risk for high blood pressure and coronary artery disease. These conditions can make your heart work harder. Ask your healthcare provider what a healthy weight is for you. Ask him or her to help you create a weight loss plan if you are overweight.    - Do not smoke. Nicotine and other chemicals in cigarettes and cigars can cause lung and heart damage. Ask your healthcare provider for information if you currently smoke and need help to quit. E-cigarettes or smokeless tobacco still contain nicotine. Talk to your healthcare provider before you use these products.    - Limit or do not drink alcohol. Ask your healthcare provider if it is okay for you to drink alcohol. Alcohol can increase your risk for high blood pressure and coronary artery disease. Your provider can tell you how many drinks are okay to have within 24 hours or within 1 week. A drink of alcohol is 12 ounces of beer, 5 ounces of wine, or 1½ ounces of liquor.    - Eat heart-healthy foods. Heart-healthy foods include salmon, tuna, walnuts, whole-grain breads, low-fat dairy products, beans, and oils such as olive or canola oil. A dietitian or your provider can give you more information on meal plans such as the DASH (Dietary Approaches to Stop Hypertension) eating plan. The DASH plan is low in sodium, processed sugar, unhealthy fats, and total fat. It is high in potassium, calcium, and fiber. These can be found in vegetables, fruit, and whole-grain foods.    - Limit sodium (salt) as directed. Too much sodium can affect your fluid balance. Check labels to find low-sodium or no-salt-added foods. You can also make small changes to get less salt. For example, if you add salt while you cook, do not add more salt at the table. Ask your healthcare provider or dietitian for more ways to cut down on salt.    - Exercise as directed. Exercise can help keep your heart healthy. Ask your healthcare provider what activities are safe for you to do. The amount and type of exercise that is safe may depend on how severe your condition is.    - Ask your healthcare provider if you should take antibiotics before certain procedures. Some procedures may allow bacteria to get into your blood and travel to your heart. This can make your condition worse.    - Ask about vaccines you may need. Certain diseases are dangerous for a person who has aortic regurgitation. Vaccines help prevent infections that can cause some diseases. Get a flu vaccine as soon as recommended each year, usually in September or October. Your healthcare provider can tell you if you also need other vaccines, and when to get them.    What can I do to prevent aortic regurgitation?    - Manage other health conditions. High blood pressure and high cholesterol levels increase your risk for aortic regurgitation. Ask your healthcare provider for more information on managing these conditions.    - Get treatment for strep throat. If strep throat is not treated, it can cause rheumatic fever.    - Talk to your healthcare provider before you take any new medicine. Some medicines can cause aortic regurgitation.    Call your local emergency number (791 in the [place]) or have someone call if:  You have any of the following signs of a heart attack:  - Squeezing, pressure, or pain in your chest  You may also have any of the following:  - Discomfort or pain in your back, neck, jaw, stomach, or arm  - Shortness of breath  - Nausea or vomiting  - Lightheadedness or a sudden cold sweat    When should I seek immediate care?  - Your heart is beating faster than usual and you feel fluttering in your chest.  - You have new or worse swelling in your abdomen, legs, ankles, or feet.    When should I call my doctor?  - You have a fever.  - You feel more tired than usual.  - You are more short of breath than usual when you exercise or lie down.  - You cough more than usual, especially when you lie down.  - You are pregnant or think you are pregnant.  - You have questions or concerns about your condition or care.

## 2023-10-20 NOTE — ED CDU PROVIDER SUBSEQUENT DAY NOTE - PHYSICAL EXAMINATION
VITAL SIGNS: I have reviewed the vital signs.   CONSTITUTIONAL: elderly man, comfortably seated in stretcher  SKIN: no rash, no petechiae.  EYES: PER, EOMI, anicteric  ENT: tongue midline, no exudates, MMM  NECK: Supple; no meningismus, no JVD  CARD: RRR, S1, S2, no murmurs  RESP: CTAB, no respiratory distress  ABD: soft, non-tender  EXT: no edema, equal radial pulses bilaterally 2+  NEURO: awake, alert, moving all extremities with purpose

## 2023-10-20 NOTE — ED CDU PROVIDER SUBSEQUENT DAY NOTE - NS ED MD PROGRESS NOTE PROVIDER NAME FT
Chart reviewed. Pt not seen. BP stable. Restart Diovan this morning. Notes, labs, VS, diagnostic testing reviewed  Pt discussed with Dr. Elia Arevalo  We will sign off.      Drew Salinas, NP [facility]

## 2023-10-20 NOTE — ED CDU PROVIDER SUBSEQUENT DAY NOTE - HISTORY
Patient is a 72-year-old man, with a past medical history of hypertension, BPH, and anxiety, presenting to ED status post near syncope episode while at the gym.  Patient states he was doing an exercise when he suddenly felt "lightheaded and shaky" fell landing on his side.  Denies any head trauma or anticoagulation, no loss of consciousness or injury.  Patient has no complaints at this time. Otherwise denies any fever, chills, headache, changes in vision, cough, congestion, cp, palpitations, sob, n/v/d, abd pain, constipation, urinary complaints, lower extremity pain/swelling.  Patient placed into OBS for syncope work-up.   Troponin was negative x2, TTE to be performed. Patient is a 72-year-old man, with a past medical history of hypertension, BPH, and anxiety, never smoker, presenting to ED status post near syncope episode while at the gym.  Patient states he was doing an exercise when he suddenly felt "lightheaded and shaky" fell landing on his side.  Denies any head trauma or anticoagulation, no loss of consciousness or injury.  Patient has no complaints at this time. Otherwise denies any fever, chills, headache, changes in vision, cough, congestion, cp, palpitations, sob, n/v/d, abd pain, constipation, urinary complaints, lower extremity pain/swelling.  Patient placed into OBS for syncope work-up.   Troponin was negative x2, TTE to be performed.    Cardiologist is Dr. Florencia Souza.

## 2023-10-20 NOTE — ED CDU PROVIDER DISPOSITION NOTE - LAB INTERPRETATION
Summary:   1. Normal global left ventricular systolic function.   2. LV Ejection Fraction by Adams's Method with a biplane EF of 55 %.   3. Normal right ventricular size and function.   4. Moderate aortic regurgitation due to either bicuspid AV vs restricted   left coronary cusp. The jet is eccentric and may be underestimated.   5. Adequate TR velocity was not obtainedto accurately assess RVSP.   6. Consider DELMA to better evaluate aortic valve, if clinically indicated.

## 2023-10-20 NOTE — ED CDU PROVIDER DISPOSITION NOTE - CARE PROVIDER_API CALL
Florencia Souza  Interventional Cardiology  [address] [place] [id number]  Phone: [phone]  Fax: [phone]  Established Patient  Follow Up Time: 4-6 Days

## 2023-10-20 NOTE — ED CDU PROVIDER SUBSEQUENT DAY NOTE - ATTENDING CONTRIBUTION TO CARE
Pt with HTN, BPH, anxiety, came in s/p near-syncopal episode while at the gym.  Patient states he was doing an exercise when he suddenly felt "lightheaded and shaky", fell, landing on his side. Denies any head trauma or anticoagulation, no loss of consciousness or injury.  Pt continues to be asymptomatic in the ED.   Troponin was negative x2, TTE done, showed moderate aortic regurgitation. Discussed results with Dr. Souza who states these findings are not new. Advises to d/c pt, and he will see pt in the office early next week. No exercise until he sees him. Pt aware.

## 2023-10-20 NOTE — ED CDU PROVIDER DISPOSITION NOTE - PATIENT PORTAL LINK FT
You can access the FollowMyHealth Patient Portal offered by [facility] by registering at the following website: [email or web address]. By joining [facility]’s FollowMyHealth portal, you will also be able to view your health information using other applications (apps) compatible with our system.

## 2023-10-20 NOTE — ED CDU PROVIDER DISPOSITION NOTE - CLINICAL COURSE
Pt with HTN, BPH, anxiety, came in s/p near-syncopal episode while at the gym.  Patient states he was doing an exercise when he suddenly felt "lightheaded and shaky", fell, landing on his side. Denies any head trauma or anticoagulation, no loss of consciousness or injury.  Pt continues to be asymptomatic in the ED.   Troponin was negative x2, TTE done, showed moderate aortic regurgitation. Discussed results with Dr. Souza who states these findings are not new. Advises to d/c pt, and he will see pt in the office early next week. No exercise until he sees him. Pt aware.
Patient/Caregiver provided printed discharge information.

## 2023-11-06 ENCOUNTER — INPATIENT (INPATIENT)
Facility: HOSPITAL | Age: 73
LOS: 1 days | Discharge: HOME | DRG: 93 | End: 2023-11-08
Attending: STUDENT IN AN ORGANIZED HEALTH CARE EDUCATION/TRAINING PROGRAM | Admitting: INTERNAL MEDICINE
Payer: MEDICARE

## 2023-11-06 VITALS
OXYGEN SATURATION: 100 % | HEART RATE: 79 BPM | SYSTOLIC BLOOD PRESSURE: 143 MMHG | RESPIRATION RATE: 17 BRPM | DIASTOLIC BLOOD PRESSURE: 60 MMHG | HEIGHT: 66 IN | TEMPERATURE: 98 F | WEIGHT: 134.92 LBS

## 2023-11-06 DIAGNOSIS — R55 SYNCOPE AND COLLAPSE: ICD-10-CM

## 2023-11-06 DIAGNOSIS — Z98.890 OTHER SPECIFIED POSTPROCEDURAL STATES: Chronic | ICD-10-CM

## 2023-11-06 DIAGNOSIS — K40.90 UNILATERAL INGUINAL HERNIA, WITHOUT OBSTRUCTION OR GANGRENE, NOT SPECIFIED AS RECURRENT: Chronic | ICD-10-CM

## 2023-11-06 LAB
ALBUMIN SERPL ELPH-MCNC: 4.2 G/DL — SIGNIFICANT CHANGE UP (ref 3.5–5.2)
ALBUMIN SERPL ELPH-MCNC: 4.2 G/DL — SIGNIFICANT CHANGE UP (ref 3.5–5.2)
ALP SERPL-CCNC: 94 U/L — SIGNIFICANT CHANGE UP (ref 30–115)
ALP SERPL-CCNC: 94 U/L — SIGNIFICANT CHANGE UP (ref 30–115)
ALT FLD-CCNC: 14 U/L — SIGNIFICANT CHANGE UP (ref 0–41)
ALT FLD-CCNC: 14 U/L — SIGNIFICANT CHANGE UP (ref 0–41)
ANION GAP SERPL CALC-SCNC: 12 MMOL/L — SIGNIFICANT CHANGE UP (ref 7–14)
ANION GAP SERPL CALC-SCNC: 12 MMOL/L — SIGNIFICANT CHANGE UP (ref 7–14)
AST SERPL-CCNC: 23 U/L — SIGNIFICANT CHANGE UP (ref 0–41)
AST SERPL-CCNC: 23 U/L — SIGNIFICANT CHANGE UP (ref 0–41)
BASOPHILS # BLD AUTO: 0.07 K/UL — SIGNIFICANT CHANGE UP (ref 0–0.2)
BASOPHILS # BLD AUTO: 0.07 K/UL — SIGNIFICANT CHANGE UP (ref 0–0.2)
BASOPHILS NFR BLD AUTO: 0.7 % — SIGNIFICANT CHANGE UP (ref 0–1)
BASOPHILS NFR BLD AUTO: 0.7 % — SIGNIFICANT CHANGE UP (ref 0–1)
BILIRUB SERPL-MCNC: 0.4 MG/DL — SIGNIFICANT CHANGE UP (ref 0.2–1.2)
BILIRUB SERPL-MCNC: 0.4 MG/DL — SIGNIFICANT CHANGE UP (ref 0.2–1.2)
BUN SERPL-MCNC: 26 MG/DL — HIGH (ref 10–20)
BUN SERPL-MCNC: 26 MG/DL — HIGH (ref 10–20)
CALCIUM SERPL-MCNC: 9.6 MG/DL — SIGNIFICANT CHANGE UP (ref 8.4–10.5)
CALCIUM SERPL-MCNC: 9.6 MG/DL — SIGNIFICANT CHANGE UP (ref 8.4–10.5)
CHLORIDE SERPL-SCNC: 103 MMOL/L — SIGNIFICANT CHANGE UP (ref 98–110)
CHLORIDE SERPL-SCNC: 103 MMOL/L — SIGNIFICANT CHANGE UP (ref 98–110)
CO2 SERPL-SCNC: 23 MMOL/L — SIGNIFICANT CHANGE UP (ref 17–32)
CO2 SERPL-SCNC: 23 MMOL/L — SIGNIFICANT CHANGE UP (ref 17–32)
CREAT SERPL-MCNC: 1.3 MG/DL — SIGNIFICANT CHANGE UP (ref 0.7–1.5)
CREAT SERPL-MCNC: 1.3 MG/DL — SIGNIFICANT CHANGE UP (ref 0.7–1.5)
EGFR: 57 ML/MIN/1.73M2 — LOW
EGFR: 57 ML/MIN/1.73M2 — LOW
EOSINOPHIL # BLD AUTO: 0.05 K/UL — SIGNIFICANT CHANGE UP (ref 0–0.7)
EOSINOPHIL # BLD AUTO: 0.05 K/UL — SIGNIFICANT CHANGE UP (ref 0–0.7)
EOSINOPHIL NFR BLD AUTO: 0.5 % — SIGNIFICANT CHANGE UP (ref 0–8)
EOSINOPHIL NFR BLD AUTO: 0.5 % — SIGNIFICANT CHANGE UP (ref 0–8)
GLUCOSE SERPL-MCNC: 104 MG/DL — HIGH (ref 70–99)
GLUCOSE SERPL-MCNC: 104 MG/DL — HIGH (ref 70–99)
HCT VFR BLD CALC: 31.6 % — LOW (ref 42–52)
HCT VFR BLD CALC: 31.6 % — LOW (ref 42–52)
HGB BLD-MCNC: 10.8 G/DL — LOW (ref 14–18)
HGB BLD-MCNC: 10.8 G/DL — LOW (ref 14–18)
IMM GRANULOCYTES NFR BLD AUTO: 0.4 % — HIGH (ref 0.1–0.3)
IMM GRANULOCYTES NFR BLD AUTO: 0.4 % — HIGH (ref 0.1–0.3)
LYMPHOCYTES # BLD AUTO: 1.83 K/UL — SIGNIFICANT CHANGE UP (ref 1.2–3.4)
LYMPHOCYTES # BLD AUTO: 1.83 K/UL — SIGNIFICANT CHANGE UP (ref 1.2–3.4)
LYMPHOCYTES # BLD AUTO: 18.3 % — LOW (ref 20.5–51.1)
LYMPHOCYTES # BLD AUTO: 18.3 % — LOW (ref 20.5–51.1)
MAGNESIUM SERPL-MCNC: 2.3 MG/DL — SIGNIFICANT CHANGE UP (ref 1.8–2.4)
MAGNESIUM SERPL-MCNC: 2.3 MG/DL — SIGNIFICANT CHANGE UP (ref 1.8–2.4)
MCHC RBC-ENTMCNC: 30.5 PG — SIGNIFICANT CHANGE UP (ref 27–31)
MCHC RBC-ENTMCNC: 30.5 PG — SIGNIFICANT CHANGE UP (ref 27–31)
MCHC RBC-ENTMCNC: 34.2 G/DL — SIGNIFICANT CHANGE UP (ref 32–37)
MCHC RBC-ENTMCNC: 34.2 G/DL — SIGNIFICANT CHANGE UP (ref 32–37)
MCV RBC AUTO: 89.3 FL — SIGNIFICANT CHANGE UP (ref 80–94)
MCV RBC AUTO: 89.3 FL — SIGNIFICANT CHANGE UP (ref 80–94)
MONOCYTES # BLD AUTO: 0.78 K/UL — HIGH (ref 0.1–0.6)
MONOCYTES # BLD AUTO: 0.78 K/UL — HIGH (ref 0.1–0.6)
MONOCYTES NFR BLD AUTO: 7.8 % — SIGNIFICANT CHANGE UP (ref 1.7–9.3)
MONOCYTES NFR BLD AUTO: 7.8 % — SIGNIFICANT CHANGE UP (ref 1.7–9.3)
NEUTROPHILS # BLD AUTO: 7.22 K/UL — HIGH (ref 1.4–6.5)
NEUTROPHILS # BLD AUTO: 7.22 K/UL — HIGH (ref 1.4–6.5)
NEUTROPHILS NFR BLD AUTO: 72.3 % — SIGNIFICANT CHANGE UP (ref 42.2–75.2)
NEUTROPHILS NFR BLD AUTO: 72.3 % — SIGNIFICANT CHANGE UP (ref 42.2–75.2)
NRBC # BLD: 0 /100 WBCS — SIGNIFICANT CHANGE UP (ref 0–0)
NRBC # BLD: 0 /100 WBCS — SIGNIFICANT CHANGE UP (ref 0–0)
NT-PROBNP SERPL-SCNC: 1849 PG/ML — HIGH (ref 0–300)
NT-PROBNP SERPL-SCNC: 1849 PG/ML — HIGH (ref 0–300)
PLATELET # BLD AUTO: 229 K/UL — SIGNIFICANT CHANGE UP (ref 130–400)
PLATELET # BLD AUTO: 229 K/UL — SIGNIFICANT CHANGE UP (ref 130–400)
PMV BLD: 10.4 FL — SIGNIFICANT CHANGE UP (ref 7.4–10.4)
PMV BLD: 10.4 FL — SIGNIFICANT CHANGE UP (ref 7.4–10.4)
POTASSIUM SERPL-MCNC: 4.6 MMOL/L — SIGNIFICANT CHANGE UP (ref 3.5–5)
POTASSIUM SERPL-MCNC: 4.6 MMOL/L — SIGNIFICANT CHANGE UP (ref 3.5–5)
POTASSIUM SERPL-SCNC: 4.6 MMOL/L — SIGNIFICANT CHANGE UP (ref 3.5–5)
POTASSIUM SERPL-SCNC: 4.6 MMOL/L — SIGNIFICANT CHANGE UP (ref 3.5–5)
PROT SERPL-MCNC: 6.5 G/DL — SIGNIFICANT CHANGE UP (ref 6–8)
PROT SERPL-MCNC: 6.5 G/DL — SIGNIFICANT CHANGE UP (ref 6–8)
RBC # BLD: 3.54 M/UL — LOW (ref 4.7–6.1)
RBC # BLD: 3.54 M/UL — LOW (ref 4.7–6.1)
RBC # FLD: 13.3 % — SIGNIFICANT CHANGE UP (ref 11.5–14.5)
RBC # FLD: 13.3 % — SIGNIFICANT CHANGE UP (ref 11.5–14.5)
SODIUM SERPL-SCNC: 138 MMOL/L — SIGNIFICANT CHANGE UP (ref 135–146)
SODIUM SERPL-SCNC: 138 MMOL/L — SIGNIFICANT CHANGE UP (ref 135–146)
TROPONIN T SERPL-MCNC: <0.01 NG/ML — SIGNIFICANT CHANGE UP
TROPONIN T SERPL-MCNC: <0.01 NG/ML — SIGNIFICANT CHANGE UP
WBC # BLD: 9.99 K/UL — SIGNIFICANT CHANGE UP (ref 4.8–10.8)
WBC # BLD: 9.99 K/UL — SIGNIFICANT CHANGE UP (ref 4.8–10.8)
WBC # FLD AUTO: 9.99 K/UL — SIGNIFICANT CHANGE UP (ref 4.8–10.8)
WBC # FLD AUTO: 9.99 K/UL — SIGNIFICANT CHANGE UP (ref 4.8–10.8)

## 2023-11-06 PROCEDURE — 84100 ASSAY OF PHOSPHORUS: CPT

## 2023-11-06 PROCEDURE — 93010 ELECTROCARDIOGRAM REPORT: CPT

## 2023-11-06 PROCEDURE — 97161 PT EVAL LOW COMPLEX 20 MIN: CPT | Mod: GP

## 2023-11-06 PROCEDURE — 95816 EEG AWAKE AND DROWSY: CPT | Mod: 26

## 2023-11-06 PROCEDURE — 36415 COLL VENOUS BLD VENIPUNCTURE: CPT

## 2023-11-06 PROCEDURE — 85027 COMPLETE CBC AUTOMATED: CPT

## 2023-11-06 PROCEDURE — 99222 1ST HOSP IP/OBS MODERATE 55: CPT | Mod: GC

## 2023-11-06 PROCEDURE — 83735 ASSAY OF MAGNESIUM: CPT

## 2023-11-06 PROCEDURE — 70450 CT HEAD/BRAIN W/O DYE: CPT | Mod: 26,MA

## 2023-11-06 PROCEDURE — 99285 EMERGENCY DEPT VISIT HI MDM: CPT | Mod: GC

## 2023-11-06 PROCEDURE — 99222 1ST HOSP IP/OBS MODERATE 55: CPT

## 2023-11-06 PROCEDURE — 71045 X-RAY EXAM CHEST 1 VIEW: CPT | Mod: 26

## 2023-11-06 PROCEDURE — 80048 BASIC METABOLIC PNL TOTAL CA: CPT

## 2023-11-06 RX ORDER — ASPIRIN/CALCIUM CARB/MAGNESIUM 324 MG
1 TABLET ORAL
Qty: 0 | Refills: 0 | DISCHARGE

## 2023-11-06 RX ORDER — ENOXAPARIN SODIUM 100 MG/ML
40 INJECTION SUBCUTANEOUS EVERY 24 HOURS
Refills: 0 | Status: DISCONTINUED | OUTPATIENT
Start: 2023-11-06 | End: 2023-11-08

## 2023-11-06 RX ORDER — LOSARTAN POTASSIUM 100 MG/1
1 TABLET, FILM COATED ORAL
Qty: 0 | Refills: 0 | DISCHARGE

## 2023-11-06 RX ORDER — TAMSULOSIN HYDROCHLORIDE 0.4 MG/1
0.4 CAPSULE ORAL AT BEDTIME
Refills: 0 | Status: DISCONTINUED | OUTPATIENT
Start: 2023-11-06 | End: 2023-11-08

## 2023-11-06 RX ORDER — ATORVASTATIN CALCIUM 80 MG/1
10 TABLET, FILM COATED ORAL AT BEDTIME
Refills: 0 | Status: DISCONTINUED | OUTPATIENT
Start: 2023-11-06 | End: 2023-11-08

## 2023-11-06 NOTE — H&P ADULT - NSICDXPASTMEDICALHX_GEN_ALL_CORE_FT
PAST MEDICAL HISTORY:  Anxiety     BPH (benign prostatic hyperplasia)     Depression     Hypercholesteremia     Hypertension     Skin cancer      Rhombic Flap Text: The defect edges were debeveled with a #15 scalpel blade.  Given the location of the defect and the proximity to free margins a rhombic flap was deemed most appropriate.  Using a sterile surgical marker, an appropriate rhombic flap was drawn incorporating the defect.    The area thus outlined was incised deep to adipose tissue with a #15 scalpel blade.  The skin margins were undermined to an appropriate distance in all directions utilizing iris scissors.

## 2023-11-06 NOTE — ED PROVIDER NOTE - PROGRESS NOTE DETAILS
Placed call to patient's cardiologist, Dr. Morrissey no callback at this time, multiple calls by provider to office as well with no answer.  EP called to interrogate loop recorder, will interrogate device.  Neurology consulted to see patient, will see patient -CD

## 2023-11-06 NOTE — H&P ADULT - NSHPPHYSICALEXAM_GEN_ALL_CORE
GENERAL: NAD, well-developed.  HEAD:  Atraumatic, Normocephalic.  EYES: conjunctiva and sclera clear  CHEST/LUNG: GBAE. No wheezing or crackles   HEART: regular rate and rhythm; S1/S2.  ABDOMEN: Soft, Nontender, Nondistended  EXTREMITIES: No edema.   PSYCH: AAOx3.  NEUROLOGY: non-focal; moves all extremities GENERAL: NAD, well-developed.  HEAD:  Atraumatic, Normocephalic.  EYES: conjunctiva and sclera clear  CHEST/LUNG: GBAE. No wheezing or crackles   HEART: regular rate and rhythm; S1/S2. diastolic murmur +3.  ABDOMEN: Soft, Nontender, Nondistended  EXTREMITIES: No edema.   PSYCH: AAOx3.  NEUROLOGY: non-focal; moves all extremities

## 2023-11-06 NOTE — CONSULT NOTE ADULT - SUBJECTIVE AND OBJECTIVE BOX
NEUROLOGY CONSULT    HPI: 72yo M PMHx (based on reported medications) of HTN, BPH, depressive psychosis presenting for shaking. Earlier today patient was being evaluated at cardiology office for placement of a loop recorder however he experienced dizziness followed by twisting and shaking movement of b/l arms and fell to the ground due to how violent the shaking was, denied head injury, tongue biting, urinary incontinence. Remembered the episode and did not report post-episode confusion. Reported this has occurred for estimated 10 years, estimated 4 times a year, possibly becoming more frequent, was recently evaluated 10/19/23 for a similar episode after working out at the gym. Has not been previously evaluated for this condition however scheduled to see Dr. Castillo for this. Denied headache, change in vision, change in hearing, chest pain, palpitations, shortness of breath, pedal edema, nausea, vomiting, diarrhea.    mRankin score 0 at baseline  0 No symptoms at all  1 No significant disability despite symptoms; able to carry out all usual duties and activities without assistance  2 Slight disability; unable to carry out all previous activities, but able to look after own affairs  3 Moderate disability; requiring some help, but able to walk without assistance  4 Moderately severe disability; unable to walk without assistance and unable to attend to own bodily needs without assistance  5 Severe disability; bedridden, incontinent and requiring constant nursing care and attention  6 Dead     MEDICATIONS  Home Medications:  Calcium 600+D oral tablet: 1 tab(s) orally once a day (at bedtime) (27 Feb 2019 07:09)  clonazePAM 0.25 mg oral tablet: orally 2 times a day (27 Feb 2019 07:09)  Fish Oil 1200 mg oral capsule: 1 cap(s) orally once a day (27 Feb 2019 07:09)  losartan 100 mg oral tablet: 1 tab(s) orally once a day (27 Feb 2019 07:09)  Low Dose ASA 81 mg oral tablet: 1 tab(s) orally once a day (27 Feb 2019 07:09)  Multiple Vitamins oral capsule: 1 cap(s) orally once a day (27 Feb 2019 07:09)  PARoxetine 30 mg oral tablet: 1 tab(s) orally once a day (27 Feb 2019 07:09)  pravastatin 20 mg oral tablet: 1 tab(s) orally once a day (27 Feb 2019 07:09)  tamsulosin 0.4 mg oral capsule: 1 cap(s) orally once a day (27 Feb 2019 07:09)  tumeric: orally once a day (27 Feb 2019 07:09)  ZyPREXA 5 mg oral tablet: 1 tab(s) orally once a day (27 Feb 2019 07:09)    MEDICATIONS  (STANDING):    MEDICATIONS  (PRN):      FAMILY HISTORY:    SOCIAL HISTORY: negative for tobacco, alcohol, or ilicit drug use.    Allergies    No Known Allergies    Intolerances        GEN: fidgeting throughout exam, NAD, pleasant, cooperative    NEURO:   MENTAL STATUS: AAOx3  LANG/SPEECH: Fluent, intact naming, repetition & comprehension  CRANIAL NERVES:  II: Pupils equal and reactive, no RAPD, normal visual field and fundus  III, IV, VI: EOM intact, no gaze preference or deviation  V: normal  VII: no facial asymmetry  VIII: normal hearing to speech  MOTOR: 5/5 in both upper and lower extremities  REFLEXES: 2/4 throughout, bilateral flexor plantars  SENSORY: Normal to touch, temperature & pin prick in all extremiteis  COORD: Normal finger to nose and heel to shin, no tremor, no dysmetria  Gait: normal gait  NIHSS: **** ASPECT Score: ***** ICH Score: ****** (GCS)    LABS:                        10.8   9.99  )-----------( 229      ( 06 Nov 2023 13:00 )             31.6     11-06    138  |  103  |  26<H>  ----------------------------<  104<H>  4.6   |  23  |  1.3    Ca    9.6      06 Nov 2023 13:00  Mg     2.3     11-06    TPro  6.5  /  Alb  4.2  /  TBili  0.4  /  DBili  x   /  AST  23  /  ALT  14  /  AlkPhos  94  11-06    Hemoglobin A1C:   Vitamin B12     CAPILLARY BLOOD GLUCOSE      POCT Blood Glucose.: 102 mg/dL (06 Nov 2023 11:51)      Urinalysis Basic - ( 06 Nov 2023 13:00 )    Color: x / Appearance: x / SG: x / pH: x  Gluc: 104 mg/dL / Ketone: x  / Bili: x / Urobili: x   Blood: x / Protein: x / Nitrite: x   Leuk Esterase: x / RBC: x / WBC x   Sq Epi: x / Non Sq Epi: x / Bacteria: x            Microbiology:      RADIOLOGY, EKG AND ADDITIONAL TESTS: Reviewed.           NEUROLOGY CONSULT    HPI: 72yo M PMHx (based on reported medications) of HTN, BPH, depressive psychosis (olanzapine) presenting for shaking. Earlier today patient was being evaluated at cardiology office for placement of a loop recorder however he experienced dizziness followed by twisting and shaking movement of b/l arms and fell to the ground due to how violent the shaking was, denied head injury, tongue biting, urinary incontinence. Remembered the episode and did not report post-episode confusion. Reported this has occurred for estimated 10 years, estimated 4 times a year, possibly becoming more frequent, was recently evaluated 10/19/23 for a similar episode after working out at the gym. Has not been previously evaluated for this condition however scheduled to see Dr. Castillo for this. Denied headache, change in vision, change in hearing, chest pain, palpitations, shortness of breath, pedal edema, nausea, vomiting, diarrhea.    mRankin score 0 at baseline  0 No symptoms at all  1 No significant disability despite symptoms; able to carry out all usual duties and activities without assistance  2 Slight disability; unable to carry out all previous activities, but able to look after own affairs  3 Moderate disability; requiring some help, but able to walk without assistance  4 Moderately severe disability; unable to walk without assistance and unable to attend to own bodily needs without assistance  5 Severe disability; bedridden, incontinent and requiring constant nursing care and attention  6 Dead     MEDICATIONS  Home Medications:  Calcium 600+D oral tablet: 1 tab(s) orally once a day (at bedtime) (27 Feb 2019 07:09)  clonazePAM 0.25 mg oral tablet: orally 2 times a day (27 Feb 2019 07:09)  Fish Oil 1200 mg oral capsule: 1 cap(s) orally once a day (27 Feb 2019 07:09)  losartan 100 mg oral tablet: 1 tab(s) orally once a day (27 Feb 2019 07:09)  Low Dose ASA 81 mg oral tablet: 1 tab(s) orally once a day (27 Feb 2019 07:09)  Multiple Vitamins oral capsule: 1 cap(s) orally once a day (27 Feb 2019 07:09)  PARoxetine 30 mg oral tablet: 1 tab(s) orally once a day (27 Feb 2019 07:09)  pravastatin 20 mg oral tablet: 1 tab(s) orally once a day (27 Feb 2019 07:09)  tamsulosin 0.4 mg oral capsule: 1 cap(s) orally once a day (27 Feb 2019 07:09)  tumeric: orally once a day (27 Feb 2019 07:09)  ZyPREXA 5 mg oral tablet: 1 tab(s) orally once a day (27 Feb 2019 07:09)    MEDICATIONS  (STANDING):    MEDICATIONS  (PRN):      FAMILY HISTORY:    SOCIAL HISTORY: negative for tobacco, alcohol, or ilicit drug use.    Allergies    No Known Allergies    Intolerances      PHYSICAL EXAM  GEN: fidgeting throughout exam, NAD, pleasant, cooperative  NEURO:   MENTAL STATUS: AAOx3  LANG/SPEECH: Fluent, intact naming, repetition & comprehension  CRANIAL NERVES:  II: Pupils equal and reactive, no RAPD, normal visual field and fundus  III, IV, VI: EOM intact, no gaze preference or deviation  V: normal  VII: no facial asymmetry  VIII: normal hearing to speech  MOTOR: 5/5 in both upper and lower extremities. ?rigidity  REFLEXES: 2/4 throughout, bilateral flexor plantars  SENSORY: Normal to touch, temperature & pin prick in all extremities  COORD: Normal finger to nose and heel to shin, no tremor, no dysmetria  Gait: normal gait      LABS:                        10.8   9.99  )-----------( 229      ( 06 Nov 2023 13:00 )             31.6     11-06    138  |  103  |  26<H>  ----------------------------<  104<H>  4.6   |  23  |  1.3    Ca    9.6      06 Nov 2023 13:00  Mg     2.3     11-06    TPro  6.5  /  Alb  4.2  /  TBili  0.4  /  DBili  x   /  AST  23  /  ALT  14  /  AlkPhos  94  11-06    Hemoglobin A1C:   Vitamin B12     CAPILLARY BLOOD GLUCOSE      POCT Blood Glucose.: 102 mg/dL (06 Nov 2023 11:51)      Urinalysis Basic - ( 06 Nov 2023 13:00 )    Color: x / Appearance: x / SG: x / pH: x  Gluc: 104 mg/dL / Ketone: x  / Bili: x / Urobili: x   Blood: x / Protein: x / Nitrite: x   Leuk Esterase: x / RBC: x / WBC x   Sq Epi: x / Non Sq Epi: x / Bacteria: x            Microbiology:      RADIOLOGY, EKG AND ADDITIONAL TESTS: Reviewed.          < from: CT Head No Cont (11.06.23 @ 14:05) >  IMPRESSION:    1.  No acute intracranial hemorrhage or acute large territorial infarct.    2.  Cerebral and cerebellar atrophy.    3.  Periventricular white matter hypodensities, nonspecific, differential   diagnostic possibilities include ischemic change, gliosis or   demyelination.    4.  Supratentorial and infratentorial calcifications may be idiopathic be   secondary to an underlying endocrinopathy, Fahr disease or previous   infection or radiation.    --- End of Report ---      < end of copied text >

## 2023-11-06 NOTE — ED ADULT TRIAGE NOTE - CHIEF COMPLAINT QUOTE
Pt sent from [facility] [place] s/p syncopal episode while in the facility bathroom few minutes PTA, Loop recorder to the left anterior chest POA. Pt unable to give account of events leading to today's ER visit. Triage FR=056. EKG done. Pt sent from [facility] s/p syncopal episode while in the facility bathroom few minutes PTA, Loop recorder to the left anterior chest placed today. Pt unable to give account of events leading to today's ER visit. Pls call son. Triage AL=365. EKG done. Pt sent from [facility] [place] s/p syncopal episode while in the facility bathroom few minutes PTA, Loop recorder to the left anterior chest placed today. Pt unable to give account of events leading to today's ER visit. Pls call son @ #[phone]. Triage XR=959. EKG done.

## 2023-11-06 NOTE — CONSULT NOTE ADULT - ATTENDING COMMENTS
72yo M PMHx of HTN, BPH, depressive psychosis sent to ED by cardiologist after witnessing event of whole body shaking w/ upper torso twisting motion without LOC and as per patient apparently symptoms recurrent for years typically in setting of uncontrolled anxiety.  Has some mild signs of dyskinesias/akasthesia at rest particularly noticeable in the trunk.  Doubt events are epileptiform or neurologic in nature.  OK to check REEG since no prior EEGs in the past but if EEG (-), no further inpt neurologic w/u and recommend close psychiatric f/u.

## 2023-11-06 NOTE — H&P ADULT - HISTORY OF PRESENT ILLNESS
74yo M PMHx (based on reported medications) of HTN, BPH, depressive psychosis (olanzapine) presenting for shaking. Earlier today patient was being evaluated at cardiology office for placement of a loop recorder however he experienced dizziness followed by twisting and shaking movement of b/l arms and fell to the ground due to how violent the shaking was, denied head injury, tongue biting, urinary incontinence. Remembered the episode and did not report post-episode confusion. Reported this has occurred for estimated 10 years, estimated 4 times a year, possibly becoming more frequent, was recently evaluated 10/19/23 for a similar episode after working out at the gym. Has not been previously evaluated for this condition however scheduled to see Dr. Castillo for this. Denied headache, change in vision, change in hearing, chest pain, palpitations, shortness of breath, pedal edema, nausea, vomiting, diarrhea.    In the ED: vitals stable  Labs remarkable for: hb 10.8 (BL 10-12?), Pro BNP 1849  CXR: No radiographic evidence of acute cardiopulmonary disease.  CTH: 1.  No acute intracranial hemorrhage or acute large territorial infarct. 2.  Cerebral and cerebellar atrophy. 3.  Periventricular white matter hypodensities, nonspecific, differential diagnostic possibilities include ischemic change, gliosis or demyelination.  4.  Supratentorial and infratentorial calcifications may be idiopathic be secondary to an underlying endocrinopathy, Fahr disease or previous infection or radiation.  EKG: stable. no acute ischemic changes     TTE 10/2023:  EF of 55 %. Moderate AR due to either bicuspid AV vs restricted left coronary cusp. The jet is eccentric and may be underestimated. Consider DELMA to better evaluate aortic valve, if clinically indicated.      Neuro eval and recs in the ED -->    72yo M PMHx (based on reported medications) of HTN, BPH, depressive psychosis (olanzapine) presenting for shaking. Earlier today patient was being evaluated at cardiology office for placement of a loop recorder however he experienced dizziness followed by twisting and shaking movement of b/l arms and fell to the ground due to how violent the shaking was, denied head injury, tongue biting, urinary incontinence. Remembered the episode and did not report post-episode confusion. Reported this has occurred for estimated 10 years, estimated 4 times a year, possibly becoming more frequent, was recently evaluated 10/19/23 for a similar episode after working out at the gym.     In the ED: vitals stable  Labs remarkable for: hb 10.8 (BL 10-12?), Pro BNP 1849  EKG: stable. no acute ischemic changes   TTE 10/2023:  EF of 55 %. Moderate AR due to either bicuspid AV vs restricted left coronary cusp. The jet is eccentric and may be underestimated. Consider DELMA to better evaluate aortic valve, if clinically indicated.  CXR: No radiographic evidence of acute cardiopulmonary disease.    CTH: 1.  No acute intracranial hemorrhage or acute large territorial infarct. 2.  Cerebral and cerebellar atrophy. 3.  Periventricular white matter hypodensities, nonspecific, differential diagnostic possibilities include ischemic change, gliosis or demyelination.  4.  Supratentorial and infratentorial calcifications may be idiopathic be secondary to an underlying endocrinopathy, Fahr disease or previous infection or radiation.    Neuro eval and recs in the ED -->Less likely seizure, possible dyskinesia (possibly tardive dyskinesia) 2/2 olanzapine.  recs rEEG to r/o seizure,  if REEG negative, outpatient psych followup to discuss medications. o/p f/u with Dr. Castillo.    Patient admitted to medicine for further workup and management.    72yo M PMHx (based on reported medications) of HTN, BPH, depressive psychosis (olanzapine) presenting for shaking. Earlier today patient was being evaluated at cardiology office for placement of a loop recorder however he experienced dizziness followed by twisting and shaking movement of b/l arms and fell to the ground due to how violent the shaking was, denied head injury, tongue biting, urinary incontinence. Remembered the episode and did not report post-episode confusion. Reported this has occurred for estimated 10 years, estimated 4 times a year, possibly becoming more frequent, was recently evaluated 10/19/23 for a similar episode after working out at the gym.     In the ED: vitals stable  Labs remarkable for: hb 10.8 (BL 10-12?), Pro BNP 1849  EKG: stable. no acute ischemic changes   TTE 10/2023:  EF of 55 %. Moderate AR due to either bicuspid AV vs restricted left coronary cusp. The jet is eccentric and may be underestimated. Consider DELMA to better evaluate aortic valve, if clinically indicated.  CXR: No radiographic evidence of acute cardiopulmonary disease.    CTH: 1.  No acute intracranial hemorrhage or acute large territorial infarct. 2.  Cerebral and cerebellar atrophy. 3.  Periventricular white matter hypodensities, nonspecific, differential diagnostic possibilities include ischemic change, gliosis or demyelination.  4.  Supratentorial and infratentorial calcifications may be idiopathic be secondary to an underlying endocrinopathy, Fahr disease or previous infection or radiation.    Neuro eval and recs in the ED -->Less likely seizure, possible dyskinesia (possibly tardive dyskinesia) 2/2 olanzapine.  recs rEEG to r/o seizure,  if REEG negative, outpatient psych followup to discuss medications. o/p f/u with Dr. Castillo.    Patient admitted to telemetry for presyncope workup and management.    74yo M PMHx (based on reported medications) of HTN, DL, BPH, depressive psychosis (olanzapine) presenting for shaking. Earlier today patient was being evaluated at cardiology office Dr Souza for placement of a loop recorder however he experienced dizziness followed by twisting and shaking movement of b/l arms and fell to the ground due to how violent the shaking was. He denies head injury, tongue biting, urinary incontinence. He Remembers the episode and did not report post-episode confusion. It's recurrent, happens 4 times a year for the past 10 years, that's why he has an upcoming neurology appointment and he reports that why he was getting the loop recorder. He doesn't remember the name of his psych.      In the ED: vitals stable  Labs remarkable for: hb 10.8 (BL 10-12?), Pro BNP 1849  EKG: stable. no acute ischemic changes   TTE 10/2023:  EF of 55 %. Moderate AR due to either bicuspid AV vs restricted left coronary cusp. The jet is eccentric and may be underestimated. Consider DELMA to better evaluate aortic valve, if clinically indicated.  CXR: No radiographic evidence of acute cardiopulmonary disease.    CTH: 1.  No acute intracranial hemorrhage or acute large territorial infarct. 2.  Cerebral and cerebellar atrophy. 3.  Periventricular white matter hypodensities, nonspecific, differential diagnostic possibilities include ischemic change, gliosis or demyelination.  4.  Supratentorial and infratentorial calcifications may be idiopathic be secondary to an underlying endocrinopathy, Fahr disease or previous infection or radiation.    Neuro eval and recs in the ED -->Less likely seizure, possible dyskinesia (possibly tardive dyskinesia) 2/2 olanzapine.  recs rEEG to r/o seizure,  if REEG negative, outpatient psych followup to discuss medications. o/p f/u with Dr. Castillo.    Patient admitted to telemetry? for tremors workup and management.

## 2023-11-06 NOTE — H&P ADULT - NSHPLABSRESULTS_GEN_ALL_CORE
10.8   9.99  )-----------( 229      ( 06 Nov 2023 13:00 )             31.6       11-06    138  |  103  |  26<H>  ----------------------------<  104<H>  4.6   |  23  |  1.3    Ca    9.6      06 Nov 2023 13:00  Mg     2.3     11-06    TPro  6.5  /  Alb  4.2  /  TBili  0.4  /  DBili  x   /  AST  23  /  ALT  14  /  AlkPhos  94  11-06              Urinalysis Basic - ( 06 Nov 2023 13:00 )    Color: x / Appearance: x / SG: x / pH: x  Gluc: 104 mg/dL / Ketone: x  / Bili: x / Urobili: x   Blood: x / Protein: x / Nitrite: x   Leuk Esterase: x / RBC: x / WBC x   Sq Epi: x / Non Sq Epi: x / Bacteria: x            Lactate Trend      CARDIAC MARKERS ( 06 Nov 2023 13:00 )  x     / <0.01 ng/mL / x     / x     / x            CAPILLARY BLOOD GLUCOSE      POCT Blood Glucose.: 102 mg/dL (06 Nov 2023 11:51)

## 2023-11-06 NOTE — ED PROVIDER NOTE - NS ED ATTENDING STATEMENT MOD
This was a shared visit with the CINTHIA. I reviewed and verified the documentation and independently performed the documented: Attending with

## 2023-11-06 NOTE — CONSULT NOTE ADULT - ASSESSMENT
72yo M PMHx of HTN, BPH, depressive psychosis presenting for shaking. Earlier today patient was being evaluated at cardiology office for placement of a loop recorder however he experienced dizziness followed by twisting and shaking movement of b/l arms and fell to the ground due to how violent the shaking was, denied head injury, tongue biting, urinary incontinence. Remembered the episode and did not report post-episode confusion. Reported this has occurred for estimated 10 years, estimated 4 times a year, possibly becoming more frequent, was recently evaluated 10/19/23 for a similar episode after working out at the gym.     #dyskinesia  - mainly affecting trunk and b/l arms  - did not report post-ictal confusion, tongue biting, urinary incontinence  - fidgeting movements  - taking olanzapine  - low suspicion for seizure  - possible tardive dyskinesia secondary to olanzapine  - rEEG to r/o seizure  - follow up outpatient psychiatry to evaluate weaning olanzapine  - follow up with Dr. Castillo    Plan discussed with attending Dr. Khan 72yo M PMHx of HTN, BPH, depressive psychosis presenting for shaking. Neurology was consulted to evaluate for seizure. Episode was described as dizziness followed by twisting of his body so severe that he lost his balance. Less likely seizure as he remembered the episode and did not report post-episode confusion, and description of twisting movement is not a typical seizure movement. Given his mild rigidity on exam i/s/o olanzapine use it is possible the twisting is a dyskinesia (possibly  tardive dyskinesia) and this episode may have been related to dyskinesia vs non-epileptic shaking.     Recommendations:  - rEEG to r/o seizure  - if REEG negative, outpatient psych followup to discuss medications  - follow up with outpatient neurologist Dr. Castillo (please print this note for patient to bring to office)    Plan discussed with attending Dr. Khan

## 2023-11-06 NOTE — EEG REPORT - NS EEG TEXT BOX
[facility] Department of Neurology  Inpatient Routine  ElectroEncephaloGram (EEG)    Patient Name:	MTATHEW RAO    :	1950  MRN:	-    Study Start Date/Time:	2023, 5:22:29 PM      Brief Clinical History:  MATTHEW RAO is a 73 year old Male; study performed to investigate for seizures or markers of epilepsy.   Technologist notes: HYPERCHOLESTEREMIA, DEPRESSION, ANXIETY, SKIN CA, HTN, SYNCOPE    Diagnosis Code:  R56.9 convulsions/seizure  CPT:   39930 (awake/sleep)     Pertinent Medication:	  n/a    Acquisition Details:  Electroencephalography was acquired using a minimum of 21 channels on an [employer] system v 9.3.1 with electrode placement according to the standard International 10-20 system following ACNS (American Clinical Neurophysiology Society) guidelines.  Anterior temporal T1 and T2 electrodes were utilized whenever possible    Findings:  Background:  continuous, with predominantly alpha and beta frequencies.  Voltage:  Normal (20+ uV)  Generalized Slowing:  None  Organization:  Appropriate anterior-posterior gradient  Posterior Dominant Rhythm:  8-8.5 Hz symmetric, well-organized, and well-modulated  Focal abnormalities:  No persistent asymmetries of voltage or frequency.  Sleep:  Absent.  Spontaneous Activity:  No epileptiform discharges    Events:  No electrographic seizures or significant clinical events occurred during this study.  	Significant myogenic artifact.   Provocations:  1)	Hyperventilation: was not performed.  2)	Photic stimulation: was not performed.  Impression:  Normal routine EEG, awake and drowsy    1)	No abnormalities were identified during this study.  2)	No electrographic seizures or significant clinical events occurred during this study.  3)	Significant myogenic artifact.     Final Clinical Correlation:  1)	 There were no findings of active epilepsy, however this alone does not rule out the diagnosis.      Gilda Melendez MD  Attending Neurologist, [facility]

## 2023-11-06 NOTE — ED PROVIDER NOTE - CLINICAL SUMMARY MEDICAL DECISION MAKING FREE TEXT BOX
Patient is a 73-year-old male with past medical history of hypertension, anxiety, loop recorder in place presenting from cardiology office due to concern for presyncopal/syncopal episode.  Pt was in the bathroom, and had a syncopal episode after getting up in the bathroom (orthostatic?), pt had a recent cardiac workup. Well appearing, NAD, non toxic. NCAT PERRLA EOMI neck supple non tender normal wob cta bl rrr abdomen s nt nd no rebound no guarding WWPx4 neuro non focal,     Labs and EKG were ordered and reviewed.  Imaging was ordered and reviewed by me.  Appropriate medications for patient's presenting complaints were ordered and effects were reassessed.  Patient's records (prior hospital, ED visit, and/or nursing home notes if available) were reviewed.  Additional history was obtained from EMS, family, and/or PCP (where available).  Escalation to admission/observation was considered.  Patient requires inpatient hospitalization - monitored setting.

## 2023-11-06 NOTE — H&P ADULT - ATTENDING COMMENTS
HPI:  72yo M PMHx (based on reported medications) of HTN, DL, BPH, depressive psychosis (olanzapine) presenting for shaking. Earlier today patient was being evaluated at cardiology office Dr Souza for placement of a loop recorder however he experienced dizziness followed by twisting and shaking movement of b/l arms and fell to the ground due to how violent the shaking was. He denies head injury, tongue biting, urinary incontinence. He Remembers the episode and did not report post-episode confusion. It's recurrent, happens 4 times a year for the past 10 years, that's why he has an upcoming neurology appointment and he reports that why he was getting the loop recorder. He doesn't remember the name of his psych.      In the ED: vitals stable  Labs remarkable for: hb 10.8 (BL 10-12?), Pro BNP 1849  EKG: stable. no acute ischemic changes   TTE 10/2023:  EF of 55 %. Moderate AR due to either bicuspid AV vs restricted left coronary cusp. The jet is eccentric and may be underestimated. Consider DELMA to better evaluate aortic valve, if clinically indicated.  CXR: No radiographic evidence of acute cardiopulmonary disease.    CTH: 1.  No acute intracranial hemorrhage or acute large territorial infarct. 2.  Cerebral and cerebellar atrophy. 3.  Periventricular white matter hypodensities, nonspecific, differential diagnostic possibilities include ischemic change, gliosis or demyelination.  4.  Supratentorial and infratentorial calcifications may be idiopathic be secondary to an underlying endocrinopathy, Fahr disease or previous infection or radiation.    Neuro eval and recs in the ED -->Less likely seizure, possible dyskinesia (possibly tardive dyskinesia) 2/2 olanzapine.  recs rEEG to r/o seizure,  if REEG negative, outpatient psych followup to discuss medications. o/p f/u with Dr. Castillo.    Patient admitted to telemetry? for tremors workup and management.    (06 Nov 2023 22:09)    REVIEW OF SYSTEMS: see cc/HPI   CONSTITUTIONAL: No weakness, fevers or chills  EYES/ENT: No visual changes;  No vertigo or throat pain   NECK: No pain or stiffness  RESPIRATORY: No cough, wheezing, hemoptysis; No shortness of breath  CARDIOVASCULAR: No chest pain or palpitations  GASTROINTESTINAL: No abdominal or epigastric pain. No nausea, vomiting, or hematemesis; No diarrhea or constipation. No melena or hematochezia.  GENITOURINARY: No dysuria, frequency or hematuria  NEUROLOGICAL: No numbness or weakness  SKIN: No itching, rashes  ENDO: No hyperglycemia, No thyroid disorder, No dyslipidemia   HEM: No bleeding, No easy bruising, No anemia   PSYCHE: No psychosis, No mood disorder No hallucinations No delusion   MSK: No deformity, No fracture, No Joint swelling    Physical Exam:   General: WN/WD NAD  Neurology: A&Ox3, nonfocal, follows commands  Eyes: PERRLA/ EOMI  ENT/Neck: Neck supple, trachea midline, No JVD  Respiratory: CTA B/L, No wheezing, rales, rhonchi  CV: Normal rate regular rhythm, S1S2, no murmurs, rubs or gallops  Abdominal: Soft, NT, ND +BS,   Extremities: No edema, + peripheral pulses  Skin: No Rashes, Hematoma, Ecchymosis  Incisions:   Tubes:    A/p  Tremors w/ twisting and shaking movement r/o TD ( patient on Olanzapine )  -admit to tele   -2D echo   -orthostatic vitals  -rEEG  -Neurology   -Cardiology eval      HTN   Dyslipidemia   AR   -2D echo HPI:  72yo M PMHx (based on reported medications) of HTN, DL, BPH, depressive psychosis (olanzapine) presenting for shaking. Earlier today patient was being evaluated at cardiology office Dr Souza for placement of a loop recorder however he experienced dizziness followed by twisting and shaking movement of b/l arms and fell to the ground due to how violent the shaking was. He denies head injury, tongue biting, urinary incontinence. He Remembers the episode and did not report post-episode confusion. It's recurrent, happens 4 times a year for the past 10 years, that's why he has an upcoming neurology appointment and he reports that why he was getting the loop recorder. He doesn't remember the name of his psych.      In the ED: vitals stable  Labs remarkable for: hb 10.8 (BL 10-12?), Pro BNP 1849  EKG: stable. no acute ischemic changes   TTE 10/2023:  EF of 55 %. Moderate AR due to either bicuspid AV vs restricted left coronary cusp. The jet is eccentric and may be underestimated. Consider DELMA to better evaluate aortic valve, if clinically indicated.  CXR: No radiographic evidence of acute cardiopulmonary disease.    CTH: 1.  No acute intracranial hemorrhage or acute large territorial infarct. 2.  Cerebral and cerebellar atrophy. 3.  Periventricular white matter hypodensities, nonspecific, differential diagnostic possibilities include ischemic change, gliosis or demyelination.  4.  Supratentorial and infratentorial calcifications may be idiopathic be secondary to an underlying endocrinopathy, Fahr disease or previous infection or radiation.    Neuro eval and recs in the ED -->Less likely seizure, possible dyskinesia (possibly tardive dyskinesia) 2/2 olanzapine.  recs rEEG to r/o seizure,  if REEG negative, outpatient psych followup to discuss medications. o/p f/u with Dr. Castillo.    Patient admitted to telemetry? for tremors workup and management.    (06 Nov 2023 22:09)    REVIEW OF SYSTEMS: see cc/HPI   CONSTITUTIONAL: No weakness, fevers or chills  EYES/ENT: No visual changes;  No vertigo or throat pain   NECK: No pain or stiffness  RESPIRATORY: No cough, wheezing, hemoptysis; No shortness of breath  CARDIOVASCULAR: No chest pain or palpitations  GASTROINTESTINAL: No abdominal or epigastric pain. No nausea, vomiting, or hematemesis; No diarrhea or constipation. No melena or hematochezia.  GENITOURINARY: No dysuria, frequency or hematuria  NEUROLOGICAL: No numbness or weakness  SKIN: No itching, rashes  ENDO: No hyperglycemia, No thyroid disorder, No dyslipidemia   HEM: No bleeding, No easy bruising, No anemia   PSYCHE: No psychosis, No mood disorder No hallucinations No delusion   MSK: No deformity, No fracture, No Joint swelling    Physical Exam:   General: WN/WD NAD  Neurology: A&Ox3, nonfocal, follows commands  Eyes: PERRLA/ EOMI  ENT/Neck: Neck supple, trachea midline, No JVD  Respiratory: CTA B/L, No wheezing, rales, rhonchi  CV: Normal rate regular rhythm, S1S2, no murmurs, rubs or gallops  Abdominal: Soft, NT, ND +BS,   Extremities: No edema, + peripheral pulses  Skin: No Rashes, Hematoma, Ecchymosis  Incisions:   Tubes:    A/p  Parkinson's disease   Tremors w/ twisting and shaking movement r/o TD ( patient on Olanzapine )  -admit to tele   -2D echo   -orthostatic vitals  -rEEG  -Neurology   -Cardiology eval   -  HTN   Dyslipidemia   AR   -2D echo  -statin / losartan/ ASA     DVT prophylaxis

## 2023-11-06 NOTE — ED PROVIDER NOTE - OBJECTIVE STATEMENT
Patient is a 73-year-old male with past medical history of hypertension, anxiety, loop recorder in place presenting from cardiology office due to concern for presyncopal/syncopal episode.  Patient says he was in bathroom, not bearing down, just standing, when he felt dizzy, shaky, lowered himself to the ground.  Patient denies loss of consciousness during episode.  Denies tongue biting or incontinence.  Denies chest pain.  Patient sent in from cardiology office for evaluation.  Patient recently admitted to ED observation unit on October 19 due to similar episode that occurred during exertion, echo showing moderate aortic regurg at that time which patient's cardiologist said was unchanged.  Patient at baseline at this time, denies any complaints

## 2023-11-06 NOTE — H&P ADULT - ASSESSMENT
#MISC  - DVT PPx: lovenox 40 sc qd  - GI PPx: low risk  - Diet: Regular DASH  - Activity: IAT  - Labs: ordered  - Code: Full code  - Dispo: Medicine    - Medrec: confirmed with the patient     #MISC  - DVT PPx: lovenox 40 sc qd  - GI PPx: low risk  - Diet: Regular DASH  - Activity: IAT  - Labs: ordered  - Code: Full code  - Dispo: telemetry for presyncope    - Medrec: confirmed with the patient 74yo M PMHx (based on reported medications) of HTN, DL, BPH, depressive psychosis (olanzapine) presenting for shaking. Earlier today patient was being evaluated at cardiology office Dr Ross for placement of a loop recorder however he experienced dizziness followed by twisting and shaking movement of b/l arms and fell to the ground due to how violent the shaking was. He denies head injury, tongue biting, urinary incontinence. He Remembers the episode and did not report post-episode confusion. It's recurrent, happens 4 times a year for the past 10 years, that's why he has an upcoming neurology appointment and he reports that why he was getting the loop recorder. He doesn't remember the name of his psych.      #Recurrent tremors and body shaking - r/o seizure activity VS tardive dyskinesia 2/2 olanzapine?  #Hx of depressive psychosis   -CTH: 1.  No acute intracranial hemorrhage or acute large territorial infarct. 2.  Cerebral and cerebellar atrophy. 3.  Periventricular white matter hypodensities, nonspecific, differential diagnostic possibilities include ischemic change, gliosis or demyelination.  4.  Supratentorial and infratentorial calcifications may be idiopathic be secondary to an underlying endocrinopathy, Fahr disease or previous infection or radiation.  -Neuro eval and recs in the ED -->Less likely seizure, possible dyskinesia (possibly tardive dyskinesia) 2/2 olanzapine.  recs rEEG to r/o seizure,  if REEG negative, outpatient psych followup to discuss medications. o/p f/u with Dr. Castillo.  - rEEG -ve for seizure  - o/p neurology follow up  - o/p psych referral. patient doesn't remember his psychiatrist  - no events on tele so far. d/c tele in 24hrs if no events.     #HTN  #DL  #AR  -TTE 10/2023:  EF of 55 %. Moderate AR due to either bicuspid AV vs restricted left coronary cusp. The jet is eccentric and may be underestimated. Consider DELMA to better evaluate aortic valve, if clinically indicated.  -CXR: No radiographic evidence of acute cardiopulmonary disease.  - pro BNP 1.8K, no hx of HF  - no fluid overload on exam  - o/p loop recorder with cardiology Dr ross  - he stopped home losartan today for low BP as per Dr Ross's recs  - cw home statin      #MISC  - DVT PPx: lovenox 40 sc qd  - GI PPx: low risk  - Diet: Regular DASH  - Activity: IAT  - Labs: ordered  - Code: Full code  - Dispo: telemetry for tremors     - Medrec: INCOMPLETE.   patient confirms he takes pravastatin, zyprexa, paroxetine, and tamsulosin he stopped losartan today.   the rest he doesn't remember, and he is not sure of the dosing  PLEASE CALL HIS [facility] IN AM TO CONFIRM MEDREC. it's closed now

## 2023-11-07 ENCOUNTER — TRANSCRIPTION ENCOUNTER (OUTPATIENT)
Age: 73
End: 2023-11-07

## 2023-11-07 LAB
ANION GAP SERPL CALC-SCNC: 10 MMOL/L — SIGNIFICANT CHANGE UP (ref 7–14)
ANION GAP SERPL CALC-SCNC: 10 MMOL/L — SIGNIFICANT CHANGE UP (ref 7–14)
BUN SERPL-MCNC: 23 MG/DL — HIGH (ref 10–20)
BUN SERPL-MCNC: 23 MG/DL — HIGH (ref 10–20)
CALCIUM SERPL-MCNC: 9.2 MG/DL — SIGNIFICANT CHANGE UP (ref 8.4–10.5)
CALCIUM SERPL-MCNC: 9.2 MG/DL — SIGNIFICANT CHANGE UP (ref 8.4–10.5)
CHLORIDE SERPL-SCNC: 100 MMOL/L — SIGNIFICANT CHANGE UP (ref 98–110)
CHLORIDE SERPL-SCNC: 100 MMOL/L — SIGNIFICANT CHANGE UP (ref 98–110)
CO2 SERPL-SCNC: 26 MMOL/L — SIGNIFICANT CHANGE UP (ref 17–32)
CO2 SERPL-SCNC: 26 MMOL/L — SIGNIFICANT CHANGE UP (ref 17–32)
CREAT SERPL-MCNC: 1.1 MG/DL — SIGNIFICANT CHANGE UP (ref 0.7–1.5)
CREAT SERPL-MCNC: 1.1 MG/DL — SIGNIFICANT CHANGE UP (ref 0.7–1.5)
EGFR: 70 ML/MIN/1.73M2 — SIGNIFICANT CHANGE UP
EGFR: 70 ML/MIN/1.73M2 — SIGNIFICANT CHANGE UP
GLUCOSE SERPL-MCNC: 89 MG/DL — SIGNIFICANT CHANGE UP (ref 70–99)
GLUCOSE SERPL-MCNC: 89 MG/DL — SIGNIFICANT CHANGE UP (ref 70–99)
HCT VFR BLD CALC: 34.5 % — LOW (ref 42–52)
HCT VFR BLD CALC: 34.5 % — LOW (ref 42–52)
HGB BLD-MCNC: 11.5 G/DL — LOW (ref 14–18)
HGB BLD-MCNC: 11.5 G/DL — LOW (ref 14–18)
MAGNESIUM SERPL-MCNC: 2.4 MG/DL — SIGNIFICANT CHANGE UP (ref 1.8–2.4)
MAGNESIUM SERPL-MCNC: 2.4 MG/DL — SIGNIFICANT CHANGE UP (ref 1.8–2.4)
MCHC RBC-ENTMCNC: 30.2 PG — SIGNIFICANT CHANGE UP (ref 27–31)
MCHC RBC-ENTMCNC: 30.2 PG — SIGNIFICANT CHANGE UP (ref 27–31)
MCHC RBC-ENTMCNC: 33.3 G/DL — SIGNIFICANT CHANGE UP (ref 32–37)
MCHC RBC-ENTMCNC: 33.3 G/DL — SIGNIFICANT CHANGE UP (ref 32–37)
MCV RBC AUTO: 90.6 FL — SIGNIFICANT CHANGE UP (ref 80–94)
MCV RBC AUTO: 90.6 FL — SIGNIFICANT CHANGE UP (ref 80–94)
NRBC # BLD: 0 /100 WBCS — SIGNIFICANT CHANGE UP (ref 0–0)
NRBC # BLD: 0 /100 WBCS — SIGNIFICANT CHANGE UP (ref 0–0)
PHOSPHATE SERPL-MCNC: 3.5 MG/DL — SIGNIFICANT CHANGE UP (ref 2.1–4.9)
PHOSPHATE SERPL-MCNC: 3.5 MG/DL — SIGNIFICANT CHANGE UP (ref 2.1–4.9)
PLATELET # BLD AUTO: 241 K/UL — SIGNIFICANT CHANGE UP (ref 130–400)
PLATELET # BLD AUTO: 241 K/UL — SIGNIFICANT CHANGE UP (ref 130–400)
PMV BLD: 11 FL — HIGH (ref 7.4–10.4)
PMV BLD: 11 FL — HIGH (ref 7.4–10.4)
POTASSIUM SERPL-MCNC: 4.6 MMOL/L — SIGNIFICANT CHANGE UP (ref 3.5–5)
POTASSIUM SERPL-MCNC: 4.6 MMOL/L — SIGNIFICANT CHANGE UP (ref 3.5–5)
POTASSIUM SERPL-SCNC: 4.6 MMOL/L — SIGNIFICANT CHANGE UP (ref 3.5–5)
POTASSIUM SERPL-SCNC: 4.6 MMOL/L — SIGNIFICANT CHANGE UP (ref 3.5–5)
RBC # BLD: 3.81 M/UL — LOW (ref 4.7–6.1)
RBC # BLD: 3.81 M/UL — LOW (ref 4.7–6.1)
RBC # FLD: 13.6 % — SIGNIFICANT CHANGE UP (ref 11.5–14.5)
RBC # FLD: 13.6 % — SIGNIFICANT CHANGE UP (ref 11.5–14.5)
SODIUM SERPL-SCNC: 136 MMOL/L — SIGNIFICANT CHANGE UP (ref 135–146)
SODIUM SERPL-SCNC: 136 MMOL/L — SIGNIFICANT CHANGE UP (ref 135–146)
WBC # BLD: 7.95 K/UL — SIGNIFICANT CHANGE UP (ref 4.8–10.8)
WBC # BLD: 7.95 K/UL — SIGNIFICANT CHANGE UP (ref 4.8–10.8)
WBC # FLD AUTO: 7.95 K/UL — SIGNIFICANT CHANGE UP (ref 4.8–10.8)
WBC # FLD AUTO: 7.95 K/UL — SIGNIFICANT CHANGE UP (ref 4.8–10.8)

## 2023-11-07 PROCEDURE — 99232 SBSQ HOSP IP/OBS MODERATE 35: CPT

## 2023-11-07 RX ADMIN — ENOXAPARIN SODIUM 40 MILLIGRAM(S): 100 INJECTION SUBCUTANEOUS at 21:30

## 2023-11-07 RX ADMIN — ATORVASTATIN CALCIUM 10 MILLIGRAM(S): 80 TABLET, FILM COATED ORAL at 21:30

## 2023-11-07 RX ADMIN — TAMSULOSIN HYDROCHLORIDE 0.4 MILLIGRAM(S): 0.4 CAPSULE ORAL at 21:30

## 2023-11-07 NOTE — PATIENT PROFILE ADULT - FALL HARM RISK - HARM RISK INTERVENTIONS

## 2023-11-07 NOTE — PATIENT PROFILE ADULT - STATED REASON FOR ADMISSION
Pt sent from [place] [facility] s/p syncopal episode while in the facility bathroom few minutes PTA, Loop recorder to the left anterior chest placed today. Pt unable to give account of events leading to today's ER visit. Pls call son @ #[phone]. Triage IB=644. EKG done.    syncope

## 2023-11-07 NOTE — DISCHARGE NOTE NURSING/CASE MANAGEMENT/SOCIAL WORK - PATIENT PORTAL LINK FT
You can access the FollowMyHealth Patient Portal offered by [facility] by registering at the following website: [email or web address]. By joining [employer]’s FollowMyHealth portal, you will also be able to view your health information using other applications (apps) compatible with our system.

## 2023-11-07 NOTE — CONSULT NOTE ADULT - ASSESSMENT
Continue current care as per medical team  DVT prophylaxis   Neurology evaluation   Physical therapy   Obtain orthostatic vitals   Keep holding his BP medications   Encourage PO fluids   Compression stockings as out patient   Will F/U

## 2023-11-07 NOTE — PATIENT PROFILE ADULT - FUNCTIONAL ASSESSMENT - BASIC MOBILITY 6.
3-calculated by average/Not able to assess (calculate score using [facility] averaging method)

## 2023-11-07 NOTE — PATIENT PROFILE ADULT - FALL HARM RISK - FALL HARM RISK
Other Full Thickness Lip Wedge Repair (Flap) Text: Given the location of the defect and the proximity to free margins a full thickness wedge repair was deemed most appropriate.  Using a sterile surgical marker, the appropriate repair was drawn incorporating the defect and placing the expected incisions perpendicular to the vermilion border.  The vermilion border was also meticulously outlined to ensure appropriate reapproximation during the repair.  The area thus outlined was incised through and through with a #15 scalpel blade.  The muscularis and dermis were reaproximated with deep sutures following hemostasis. Care was taken to realign the vermilion border before proceeding with the superficial closure.  Once the vermilion was realigned the superfical and mucosal closure was finished.

## 2023-11-07 NOTE — DISCHARGE NOTE NURSING/CASE MANAGEMENT/SOCIAL WORK - NSDCPEFALRISK_GEN_ALL_CORE
For information on Fall & Injury Prevention, visit: [email or web address] OR  [email or web address] OR  https://www.cdc.gov/steadi/patient.html

## 2023-11-07 NOTE — CONSULT NOTE ADULT - SUBJECTIVE AND OBJECTIVE BOX
Patient is a 73y old  Male who presents with a chief complaint of Tremors (07 Nov 2023 15:46)  Denies any chest pain, SOB or palpitations       HPI:  72yo M PMHx (based on reported medications) of HTN, DL, BPH, depressive psychosis (olanzapine) presenting for shaking. Earlier today patient was being evaluated at cardiology office Dr Souza for placement of a loop recorder however he experienced dizziness followed by twisting and shaking movement of b/l arms and fell to the ground due to how violent the shaking was. He denies head injury, tongue biting, urinary incontinence. He Remembers the episode and did not report post-episode confusion. It's recurrent, happens 4 times a year for the past 10 years, that's why he has an upcoming neurology appointment and he reports that why he was getting the loop recorder. He doesn't remember the name of his psych.      In the ED: vitals stable  Labs remarkable for: hb 10.8 (BL 10-12?), Pro BNP 1849  EKG: stable. no acute ischemic changes   TTE 10/2023:  EF of 55 %. Moderate AR due to either bicuspid AV vs restricted left coronary cusp. The jet is eccentric and may be underestimated. Consider DELMA to better evaluate aortic valve, if clinically indicated.  CXR: No radiographic evidence of acute cardiopulmonary disease.    CTH: 1.  No acute intracranial hemorrhage or acute large territorial infarct. 2.  Cerebral and cerebellar atrophy. 3.  Periventricular white matter hypodensities, nonspecific, differential diagnostic possibilities include ischemic change, gliosis or demyelination.  4.  Supratentorial and infratentorial calcifications may be idiopathic be secondary to an underlying endocrinopathy, Fahr disease or previous infection or radiation.    Neuro eval and recs in the ED -->Less likely seizure, possible dyskinesia (possibly tardive dyskinesia) 2/2 olanzapine.  recs rEEG to r/o seizure,  if REEG negative, outpatient psych followup to discuss medications. o/p f/u with Dr. Castillo.    Patient admitted to telemetry? for tremors workup and management.    (06 Nov 2023 22:09)      PAST MEDICAL & SURGICAL HISTORY:  Hypertension      BPH (benign prostatic hyperplasia)      Skin cancer      Anxiety      Depression      Hypercholesteremia      Status post Mohs surgery      Inguinal hernia  left 2 years ago          PREVIOUS DIAGNOSTIC TESTING:      ECHO  FINDINGS:    STRESS  FINDINGS:    CATHETERIZATION  FINDINGS:    MEDICATIONS  (STANDING):  atorvastatin 10 milliGRAM(s) Oral at bedtime  enoxaparin Injectable 40 milliGRAM(s) SubCutaneous every 24 hours  tamsulosin 0.4 milliGRAM(s) Oral at bedtime    MEDICATIONS  (PRN):      FAMILY HISTORY:      SOCIAL HISTORY:  CIGARETTES:    ALCOHOL:    REVIEW OF SYSTEMS:  CONSTITUTIONAL: No fever, weight loss, or fatigue  NECK: No pain or stiffness  RESPIRATORY: No cough, wheezing, chills or hemoptysis; No shortness of breath  CARDIOVASCULAR: No chest pain, palpitations, dizziness, or leg swelling  GASTROINTESTINAL: No abdominal or epigastric pain. No nausea, vomiting, or hematemesis; No diarrhea or constipation. No melena or hematochezia.  GENITOURINARY: No dysuria, frequency, hematuria, or incontinence  NEUROLOGICAL: No headaches, memory loss, loss of strength, numbness, or tremors  SKIN: No itching, burning, rashes, or lesions   ENDOCRINE: No heat or cold intolerance; No hair loss  MUSCULOSKELETAL: No joint pain or swelling; No muscle, back, or extremity pain  HEME/LYMPH: No easy bruising, or bleeding gums          Vital Signs Last 24 Hrs  T(C): 36.4 (07 Nov 2023 20:22), Max: 36.8 (07 Nov 2023 08:02)  T(F): 97.5 (07 Nov 2023 20:22), Max: 98.2 (07 Nov 2023 08:02)  HR: 65 (07 Nov 2023 20:22) (62 - 65)  BP: 138/68 (07 Nov 2023 20:22) (135/60 - 149/61)  BP(mean): --  RR: 18 (07 Nov 2023 20:22) (18 - 18)  SpO2: 98% (07 Nov 2023 20:22) (98% - 99%)    Parameters below as of 07 Nov 2023 00:29  Patient On (Oxygen Delivery Method): room air            PHYSICAL EXAM:  GENERAL: NAD, well-groomed, well-developed  HEAD:  Atraumatic, Normocephalic  NECK: Supple, No JVD, Normal thyroid  NERVOUS SYSTEM:  Alert & Oriented X3, Good concentration  CHEST/LUNG: Clear to percussion bilaterally; No rales, rhonchi, wheezing, or rubs  HEART: Regular rate and rhythm; No murmurs, rubs, or gallops  ABDOMEN: Soft, Nontender, Nondistended; Bowel sounds present  EXTREMITIES:  2+ Peripheral Pulses, No clubbing, cyanosis, or edema  SKIN: No rashes or lesions    INTERPRETATION OF TELEMETRY:    ECG:    I&O's Detail      LABS:                        11.5   7.95  )-----------( 241      ( 07 Nov 2023 07:31 )             34.5     11-07    136  |  100  |  23<H>  ----------------------------<  89  4.6   |  26  |  1.1    Ca    9.2      07 Nov 2023 07:31  Phos  3.5     11-07  Mg     2.4     11-07    TPro  6.5  /  Alb  4.2  /  TBili  0.4  /  DBili  x   /  AST  23  /  ALT  14  /  AlkPhos  94  11-06    CARDIAC MARKERS ( 06 Nov 2023 13:00 )  x     / <0.01 ng/mL / x     / x     / x            Urinalysis Basic - ( 07 Nov 2023 07:31 )    Color: x / Appearance: x / SG: x / pH: x  Gluc: 89 mg/dL / Ketone: x  / Bili: x / Urobili: x   Blood: x / Protein: x / Nitrite: x   Leuk Esterase: x / RBC: x / WBC x   Sq Epi: x / Non Sq Epi: x / Bacteria: x      I&O's Summary      RADIOLOGY & ADDITIONAL STUDIES:

## 2023-11-07 NOTE — ED ADULT NURSE NOTE - CHIEF COMPLAINT QUOTE
Pt sent from [place] [facility] s/p syncopal episode while in the facility bathroom few minutes PTA, Loop recorder to the left anterior chest placed today. Pt unable to give account of events leading to today's ER visit. Pls call son @ #[phone]. Triage XT=153. EKG done.

## 2023-11-07 NOTE — PROGRESS NOTE ADULT - SUBJECTIVE AND OBJECTIVE BOX
MATTHEW RAO  73y  Male      Patient is a 73y old  Male who presents with a chief complaint of Tremors (06 Nov 2023 22:09)      INTERVAL HPI/OVERNIGHT EVENTS:      REVIEW OF SYSTEMS:  CONSTITUTIONAL: No fever, weight loss, or fatigue  RESPIRATORY: No cough, wheezing, chills or hemoptysis; No shortness of breath  CARDIOVASCULAR: No chest pain, palpitations, dizziness, or leg swelling  GASTROINTESTINAL: No abdominal or epigastric pain. No nausea, vomiting, or hematemesis; No diarrhea or constipation. No melena or hematochezia.  GENITOURINARY: No dysuria, frequency, hematuria, or incontinence  NEUROLOGICAL: No headaches, memory loss, loss of strength, numbness, or tremors  SKIN: No itching, burning, rashes, or lesions   MUSCULOSKELETAL: No joint pain or swelling; No muscle, back, or extremity pain  PSYCHIATRIC: No depression, anxiety, mood swings, or difficulty sleeping  All other review of systems negative    T(C): 35.6 (11-07-23 @ 10:54), Max: 36.8 (11-07-23 @ 08:02)  HR: 64 (11-07-23 @ 10:54) (62 - 64)  BP: 149/61 (11-07-23 @ 10:54) (135/60 - 149/61)  RR: 18 (11-07-23 @ 10:54) (18 - 18)  SpO2: 99% (11-07-23 @ 00:29) (99% - 99%)  Wt(kg): --Vital Signs Last 24 Hrs  T(C): 35.6 (07 Nov 2023 10:54), Max: 36.8 (07 Nov 2023 08:02)  T(F): 96 (07 Nov 2023 10:54), Max: 98.2 (07 Nov 2023 08:02)  HR: 64 (07 Nov 2023 10:54) (62 - 64)  BP: 149/61 (07 Nov 2023 10:54) (135/60 - 149/61)  BP(mean): --  RR: 18 (07 Nov 2023 10:54) (18 - 18)  SpO2: 99% (07 Nov 2023 00:29) (99% - 99%)    Parameters below as of 07 Nov 2023 00:29  Patient On (Oxygen Delivery Method): room air            PHYSICAL EXAM:  GENERAL: NAD, well-groomed, well-developed  PSYCH: no agitation, baseline mentation  NERVOUS SYSTEM:  Alert & Oriented X3, Motor Strength 5/5 B/L upper and lower extremities; Sensory intact; FTN WNL  PULMONARY: Clear to percussion bilaterally; No rales, rhonchi, wheezing, or rubs  CARDIOVASCULAR: Regular rate and rhythm; No murmurs, rubs, or gallops  GI: Soft, Nontender, Nondistended; Bowel sounds present  EXTREMITIES:  2+ Peripheral Pulses, No clubbing, cyanosis, or edema  LYMPH: No lymphadenopathy noted  SKIN: No rashes or lesions    Consultant(s) Notes Reviewed:  [x ] YES  [ ] NO    Discussed with Consultants/Other Providers [ x] YES     LABS                          11.5   7.95  )-----------( 241      ( 07 Nov 2023 07:31 )             34.5     11-07    136  |  100  |  23<H>  ----------------------------<  89  4.6   |  26  |  1.1    Ca    9.2      07 Nov 2023 07:31  Phos  3.5     11-07  Mg     2.4     11-07    TPro  6.5  /  Alb  4.2  /  TBili  0.4  /  DBili  x   /  AST  23  /  ALT  14  /  AlkPhos  94  11-06      Urinalysis Basic - ( 07 Nov 2023 07:31 )    Color: x / Appearance: x / SG: x / pH: x  Gluc: 89 mg/dL / Ketone: x  / Bili: x / Urobili: x   Blood: x / Protein: x / Nitrite: x   Leuk Esterase: x / RBC: x / WBC x   Sq Epi: x / Non Sq Epi: x / Bacteria: x    CARDIAC MARKERS ( 06 Nov 2023 13:00 )  x     / <0.01 ng/mL / x     / x     / x          POCT Blood Glucose.: 102 mg/dL (06 Nov 2023 11:51)      RADIOLOGY & ADDITIONAL TESTS:  CT Head No Cont (11.06.23 @ 14:05) >  IMPRESSION:    1.  No acute intracranial hemorrhage or acute large territorial infarct.    2.  Cerebral and cerebellar atrophy.    3.  Periventricular white matter hypodensities, nonspecific, differential   diagnostic possibilities include ischemic change, gliosis or   demyelination.    4.  Supratentorial and infratentorial calcifications may be idiopathic be   secondary to an underlying endocrinopathy, Fahr disease or previous   infection or radiation.      Imaging Personally Reviewed:  [ ] YES  [x ] NO    HEALTH ISSUES - PROBLEM Dx:      MEDICATIONS  (STANDING):  atorvastatin 10 milliGRAM(s) Oral at bedtime  enoxaparin Injectable 40 milliGRAM(s) SubCutaneous every 24 hours  tamsulosin 0.4 milliGRAM(s) Oral at bedtime    MEDICATIONS  (PRN):     MATTHEW RAO  73y  Male      Patient is a 73y old  Male who presents with a chief complaint of Tremors (06 Nov 2023 22:09)      INTERVAL HPI/OVERNIGHT EVENTS: no acute events overnight. lying in bed. no complaints.       REVIEW OF SYSTEMS:  CONSTITUTIONAL: No fever, weight loss, or fatigue  RESPIRATORY: No cough, wheezing, chills or hemoptysis; No shortness of breath  CARDIOVASCULAR: No chest pain, palpitations, dizziness, or leg swelling  GASTROINTESTINAL: No abdominal or epigastric pain. No nausea, vomiting, or hematemesis; No diarrhea or constipation. No melena or hematochezia.  GENITOURINARY: No dysuria, frequency, hematuria, or incontinence  NEUROLOGICAL: No headaches, memory loss, loss of strength, numbness, or tremors  SKIN: No itching, burning, rashes, or lesions   MUSCULOSKELETAL: No joint pain or swelling; No muscle, back, or extremity pain  PSYCHIATRIC: No depression, anxiety, mood swings, or difficulty sleeping  All other review of systems negative    T(C): 35.6 (11-07-23 @ 10:54), Max: 36.8 (11-07-23 @ 08:02)  HR: 64 (11-07-23 @ 10:54) (62 - 64)  BP: 149/61 (11-07-23 @ 10:54) (135/60 - 149/61)  RR: 18 (11-07-23 @ 10:54) (18 - 18)  SpO2: 99% (11-07-23 @ 00:29) (99% - 99%)  Wt(kg): --Vital Signs Last 24 Hrs  T(C): 35.6 (07 Nov 2023 10:54), Max: 36.8 (07 Nov 2023 08:02)  T(F): 96 (07 Nov 2023 10:54), Max: 98.2 (07 Nov 2023 08:02)  HR: 64 (07 Nov 2023 10:54) (62 - 64)  BP: 149/61 (07 Nov 2023 10:54) (135/60 - 149/61)  BP(mean): --  RR: 18 (07 Nov 2023 10:54) (18 - 18)  SpO2: 99% (07 Nov 2023 00:29) (99% - 99%)    Parameters below as of 07 Nov 2023 00:29  Patient On (Oxygen Delivery Method): room air            PHYSICAL EXAM:  GENERAL: elderly M, NAD, non toxic appearing  EYES: anicteric sclera, non injected conjunctiva, EOMI  ENT: hearing grossly intact  PSYCH: no agitation   NERVOUS SYSTEM:  Alert & Oriented X3 but subtle cognitive impairments noted  PULMONARY: symmetrical chest rise, no accessory muscle use  CARDIOVASCULAR: non tachycardic  GI:  Nondistended   EXTREMITIES:  No clubbing, cyanosis, or edema  SKIN: No rashes or lesions    Consultant(s) Notes Reviewed:  [x ] YES  [ ] NO    Discussed with Consultants/Other Providers [ x] YES     LABS                          11.5   7.95  )-----------( 241      ( 07 Nov 2023 07:31 )             34.5     11-07    136  |  100  |  23<H>  ----------------------------<  89  4.6   |  26  |  1.1    Ca    9.2      07 Nov 2023 07:31  Phos  3.5     11-07  Mg     2.4     11-07    TPro  6.5  /  Alb  4.2  /  TBili  0.4  /  DBili  x   /  AST  23  /  ALT  14  /  AlkPhos  94  11-06      Urinalysis Basic - ( 07 Nov 2023 07:31 )    Color: x / Appearance: x / SG: x / pH: x  Gluc: 89 mg/dL / Ketone: x  / Bili: x / Urobili: x   Blood: x / Protein: x / Nitrite: x   Leuk Esterase: x / RBC: x / WBC x   Sq Epi: x / Non Sq Epi: x / Bacteria: x    CARDIAC MARKERS ( 06 Nov 2023 13:00 )  x     / <0.01 ng/mL / x     / x     / x          POCT Blood Glucose.: 102 mg/dL (06 Nov 2023 11:51)      RADIOLOGY & ADDITIONAL TESTS:  CT Head No Cont (11.06.23 @ 14:05) >  IMPRESSION:    1.  No acute intracranial hemorrhage or acute large territorial infarct.    2.  Cerebral and cerebellar atrophy.    3.  Periventricular white matter hypodensities, nonspecific, differential   diagnostic possibilities include ischemic change, gliosis or   demyelination.    4.  Supratentorial and infratentorial calcifications may be idiopathic be   secondary to an underlying endocrinopathy, Fahr disease or previous   infection or radiation.      Imaging Personally Reviewed:  [ ] YES  [x ] NO    HEALTH ISSUES - PROBLEM Dx:      MEDICATIONS  (STANDING):  atorvastatin 10 milliGRAM(s) Oral at bedtime  enoxaparin Injectable 40 milliGRAM(s) SubCutaneous every 24 hours  tamsulosin 0.4 milliGRAM(s) Oral at bedtime    MEDICATIONS  (PRN):

## 2023-11-08 ENCOUNTER — TRANSCRIPTION ENCOUNTER (OUTPATIENT)
Age: 73
End: 2023-11-08

## 2023-11-08 VITALS
SYSTOLIC BLOOD PRESSURE: 188 MMHG | HEART RATE: 66 BPM | TEMPERATURE: 97 F | DIASTOLIC BLOOD PRESSURE: 73 MMHG | RESPIRATION RATE: 18 BRPM

## 2023-11-08 LAB
ANION GAP SERPL CALC-SCNC: 7 MMOL/L — SIGNIFICANT CHANGE UP (ref 7–14)
ANION GAP SERPL CALC-SCNC: 7 MMOL/L — SIGNIFICANT CHANGE UP (ref 7–14)
BUN SERPL-MCNC: 23 MG/DL — HIGH (ref 10–20)
BUN SERPL-MCNC: 23 MG/DL — HIGH (ref 10–20)
CALCIUM SERPL-MCNC: 9.2 MG/DL — SIGNIFICANT CHANGE UP (ref 8.4–10.5)
CALCIUM SERPL-MCNC: 9.2 MG/DL — SIGNIFICANT CHANGE UP (ref 8.4–10.5)
CHLORIDE SERPL-SCNC: 102 MMOL/L — SIGNIFICANT CHANGE UP (ref 98–110)
CHLORIDE SERPL-SCNC: 102 MMOL/L — SIGNIFICANT CHANGE UP (ref 98–110)
CO2 SERPL-SCNC: 26 MMOL/L — SIGNIFICANT CHANGE UP (ref 17–32)
CO2 SERPL-SCNC: 26 MMOL/L — SIGNIFICANT CHANGE UP (ref 17–32)
CREAT SERPL-MCNC: 0.9 MG/DL — SIGNIFICANT CHANGE UP (ref 0.7–1.5)
CREAT SERPL-MCNC: 0.9 MG/DL — SIGNIFICANT CHANGE UP (ref 0.7–1.5)
EGFR: 89 ML/MIN/1.73M2 — SIGNIFICANT CHANGE UP
EGFR: 89 ML/MIN/1.73M2 — SIGNIFICANT CHANGE UP
GLUCOSE SERPL-MCNC: 84 MG/DL — SIGNIFICANT CHANGE UP (ref 70–99)
GLUCOSE SERPL-MCNC: 84 MG/DL — SIGNIFICANT CHANGE UP (ref 70–99)
HCT VFR BLD CALC: 33.7 % — LOW (ref 42–52)
HCT VFR BLD CALC: 33.7 % — LOW (ref 42–52)
HGB BLD-MCNC: 11.1 G/DL — LOW (ref 14–18)
HGB BLD-MCNC: 11.1 G/DL — LOW (ref 14–18)
MAGNESIUM SERPL-MCNC: 2.2 MG/DL — SIGNIFICANT CHANGE UP (ref 1.8–2.4)
MAGNESIUM SERPL-MCNC: 2.2 MG/DL — SIGNIFICANT CHANGE UP (ref 1.8–2.4)
MCHC RBC-ENTMCNC: 30 PG — SIGNIFICANT CHANGE UP (ref 27–31)
MCHC RBC-ENTMCNC: 30 PG — SIGNIFICANT CHANGE UP (ref 27–31)
MCHC RBC-ENTMCNC: 32.9 G/DL — SIGNIFICANT CHANGE UP (ref 32–37)
MCHC RBC-ENTMCNC: 32.9 G/DL — SIGNIFICANT CHANGE UP (ref 32–37)
MCV RBC AUTO: 91.1 FL — SIGNIFICANT CHANGE UP (ref 80–94)
MCV RBC AUTO: 91.1 FL — SIGNIFICANT CHANGE UP (ref 80–94)
NRBC # BLD: 0 /100 WBCS — SIGNIFICANT CHANGE UP (ref 0–0)
NRBC # BLD: 0 /100 WBCS — SIGNIFICANT CHANGE UP (ref 0–0)
PLATELET # BLD AUTO: 214 K/UL — SIGNIFICANT CHANGE UP (ref 130–400)
PLATELET # BLD AUTO: 214 K/UL — SIGNIFICANT CHANGE UP (ref 130–400)
PMV BLD: 10.8 FL — HIGH (ref 7.4–10.4)
PMV BLD: 10.8 FL — HIGH (ref 7.4–10.4)
POTASSIUM SERPL-MCNC: 4.7 MMOL/L — SIGNIFICANT CHANGE UP (ref 3.5–5)
POTASSIUM SERPL-MCNC: 4.7 MMOL/L — SIGNIFICANT CHANGE UP (ref 3.5–5)
POTASSIUM SERPL-SCNC: 4.7 MMOL/L — SIGNIFICANT CHANGE UP (ref 3.5–5)
POTASSIUM SERPL-SCNC: 4.7 MMOL/L — SIGNIFICANT CHANGE UP (ref 3.5–5)
RBC # BLD: 3.7 M/UL — LOW (ref 4.7–6.1)
RBC # BLD: 3.7 M/UL — LOW (ref 4.7–6.1)
RBC # FLD: 13.4 % — SIGNIFICANT CHANGE UP (ref 11.5–14.5)
RBC # FLD: 13.4 % — SIGNIFICANT CHANGE UP (ref 11.5–14.5)
SODIUM SERPL-SCNC: 135 MMOL/L — SIGNIFICANT CHANGE UP (ref 135–146)
SODIUM SERPL-SCNC: 135 MMOL/L — SIGNIFICANT CHANGE UP (ref 135–146)
WBC # BLD: 8.22 K/UL — SIGNIFICANT CHANGE UP (ref 4.8–10.8)
WBC # BLD: 8.22 K/UL — SIGNIFICANT CHANGE UP (ref 4.8–10.8)
WBC # FLD AUTO: 8.22 K/UL — SIGNIFICANT CHANGE UP (ref 4.8–10.8)
WBC # FLD AUTO: 8.22 K/UL — SIGNIFICANT CHANGE UP (ref 4.8–10.8)

## 2023-11-08 PROCEDURE — 99239 HOSP IP/OBS DSCHRG MGMT >30: CPT

## 2023-11-08 RX ORDER — OMEGA-3 ACID ETHYL ESTERS 1 G
1 CAPSULE ORAL
Qty: 0 | Refills: 0 | DISCHARGE

## 2023-11-08 RX ORDER — CLONAZEPAM 1 MG
0 TABLET ORAL
Qty: 0 | Refills: 0 | DISCHARGE

## 2023-11-08 RX ORDER — TAMSULOSIN HYDROCHLORIDE 0.4 MG/1
1 CAPSULE ORAL
Qty: 0 | Refills: 0 | DISCHARGE

## 2023-11-08 NOTE — PHYSICAL THERAPY INITIAL EVALUATION ADULT - NSPTDISCHREC_GEN_A_CORE
Home. No further therapy indicated. All functional tasks completed effectively with safe sequencing.

## 2023-11-08 NOTE — DISCHARGE NOTE PROVIDER - HOSPITAL COURSE
Patient is a73yo M PMHx (based on reported medications) of HTN, DL, BPH, depressive psychosis (olanzapine) presented on 11/6 for shaking. Earlier the same day, the patient was being evaluated at cardiology office Dr Ross for placement of a loop recorder however he experienced dizziness followed by twisting and shaking movement of b/l arms and fell to the ground due to how violent the shaking was. He denies head injury, tongue biting, urinary incontinence. He Remembers the episode and did not report post-episode confusion. It's recurrent, happens 4 times a year for the past 10 years, that's why he has an upcoming neurology appointment and he reports that why he was getting the loop recorder. He doesn't remember the name of his psych.      In the ED: vitals stable  Labs remarkable for: hb 10.8 (BL 10-12?), Pro BNP 1849  EKG: stable. no acute ischemic changes   TTE 10/2023:  EF of 55 %. Moderate AR due to either bicuspid AV vs restricted left coronary cusp. The jet is eccentric and may be underestimated. Consider DELMA to better evaluate aortic valve, if clinically indicated.  CXR: No radiographic evidence of acute cardiopulmonary disease.    CTH: 1.  No acute intracranial hemorrhage or acute large territorial infarct. 2.  Cerebral and cerebellar atrophy. 3.  Periventricular white matter hypodensities, nonspecific, differential diagnostic possibilities include ischemic change, gliosis or demyelination.  4.  Supratentorial and infratentorial calcifications may be idiopathic be secondary to an underlying endocrinopathy, Fahr disease or previous infection or radiation.    Neuro eval and recs in the ED -->Less likely seizure, possible dyskinesia (possibly tardive dyskinesia) 2/2 olanzapine.  rEEG negative, outpatient psych followup to discuss medications. Will need to f/u with Dr. Castillo.    Patient admitted to telemetry for further workup.    #HTN  #DL  #AR  - TTE 10/2023:  EF of 55 %. Moderate AR due to either bicuspid AV vs restricted left coronary cusp. The jet is eccentric and may be underestimated. Consider DELMA to better evaluate aortic valve, if clinically indicated.  - CXR: No radiographic evidence of acute cardiopulmonary disease.  - pro BNP 1.8K, no hx of HF  - no fluid overload on exam  - o/p loop recorder with cardiology Dr ross  - stopped home losartan today for low BP as per Dr Ross's recs  -  home statin  - cardio consult- Patient was seen by cardiology, recommended to hold his BP medications, encourage PO fluids, and use compression stockings as outpatient. Orthostatics negative.Patient will need to follow up with cardiologist Dr. Ross outpatient.    Patient is medically stable for discharge.      Patient is a73yo M PMHx (based on reported medications) of HTN, DL, BPH, depressive psychosis (olanzapine) presented on 11/6 for shaking. Earlier the same day, the patient was being evaluated at cardiology office Dr Ross for placement of a loop recorder however he experienced dizziness followed by twisting and shaking movement of b/l arms and fell to the ground due to how violent the shaking was. He denies head injury, tongue biting, urinary incontinence. He Remembers the episode and did not report post-episode confusion. It's recurrent, happens 4 times a year for the past 10 years, that's why he has an upcoming neurology appointment and he reports that why he was getting the loop recorder. He doesn't remember the name of his psych.      In the ED: vitals stable  Labs remarkable for: hb 10.8 (BL 10-12?), Pro BNP 1849  EKG: stable. no acute ischemic changes   TTE 10/2023:  EF of 55 %. Moderate AR due to either bicuspid AV vs restricted left coronary cusp. The jet is eccentric and may be underestimated. Consider DELMA to better evaluate aortic valve, if clinically indicated.  CXR: No radiographic evidence of acute cardiopulmonary disease.    CTH: 1.  No acute intracranial hemorrhage or acute large territorial infarct. 2.  Cerebral and cerebellar atrophy. 3.  Periventricular white matter hypodensities, nonspecific, differential diagnostic possibilities include ischemic change, gliosis or demyelination.  4.  Supratentorial and infratentorial calcifications may be idiopathic be secondary to an underlying endocrinopathy, Fahr disease or previous infection or radiation.    Neuro eval and recs in the ED -->Less likely seizure, possible dyskinesia (possibly tardive dyskinesia) 2/2 olanzapine.  rEEG negative, outpatient psych followup to discuss medications. Will need to f/u with Dr. Castillo.    Patient admitted to telemetry for further workup.    #HTN  #DL  #AR  - TTE 10/2023:  EF of 55 %. Moderate AR due to either bicuspid AV vs restricted left coronary cusp. The jet is eccentric and may be underestimated. Consider DELMA to better evaluate aortic valve, if clinically indicated.  - CXR: No radiographic evidence of acute cardiopulmonary disease.  - pro BNP 1.8K, no hx of HF  - no fluid overload on exam  - o/p loop recorder with cardiology Dr ross  - stopped home losartan today for low BP as per Dr Ross's recs  - cw home statin  - cardio consult- Recommended to hold his BP medications, encourage PO fluids, and use compression stockings as outpatient. Orthostatics negative.Patient will need to follow up with cardiologist Dr. Ross outpatient.    Patient is medically stable for discharge.      Patient is a 74yo M PMHx (based on reported medications) of HTN, DL, BPH, depressive psychosis (olanzapine) presented on 11/6 for shaking. Earlier the same day, the patient was being evaluated at cardiology office Dr Ross for placement of a loop recorder however he experienced dizziness followed by twisting and shaking movement of b/l arms and fell to the ground due to how violent the shaking was. He denies head injury, tongue biting, urinary incontinence. He Remembers the episode and did not report post-episode confusion. It's recurrent, happens 4 times a year for the past 10 years, that's why he has an upcoming neurology appointment and he reports that why he was getting the loop recorder. He doesn't remember the name of his psych.      In the ED: vitals stable  Labs remarkable for: hb 10.8 (BL 10-12?), Pro BNP 1849  EKG: stable. no acute ischemic changes   TTE 10/2023:  EF of 55 %. Moderate AR due to either bicuspid AV vs restricted left coronary cusp. The jet is eccentric and may be underestimated. Consider DELMA to better evaluate aortic valve, if clinically indicated.  CXR: No radiographic evidence of acute cardiopulmonary disease.    CTH: 1.  No acute intracranial hemorrhage or acute large territorial infarct. 2.  Cerebral and cerebellar atrophy. 3.  Periventricular white matter hypodensities, nonspecific, differential diagnostic possibilities include ischemic change, gliosis or demyelination.  4.  Supratentorial and infratentorial calcifications may be idiopathic be secondary to an underlying endocrinopathy, Fahr disease or previous infection or radiation.    Neuro eval and recs in the ED -->Less likely seizure, possible dyskinesia (possibly tardive dyskinesia) 2/2 olanzapine.  rEEG negative, outpatient psych followup to discuss medications. Will need to f/u with Dr. Castillo.    Patient admitted to telemetry for further workup.    #HTN  #DL  #AR  - TTE 10/2023:  EF of 55 %. Moderate AR due to either bicuspid AV vs restricted left coronary cusp. The jet is eccentric and may be underestimated. Consider DELMA to better evaluate aortic valve, if clinically indicated. Can be done OP if needed.   - CXR: No radiographic evidence of acute cardiopulmonary disease.  - pro BNP 1.8K, no hx of HF  - no fluid overload on exam  - o/p loop recorder with cardiology Dr ross  - stopped home losartan today for low BP as per Dr Ross's recs  - cw home statin  - cardio consult- Recommended to hold his BP medications, encourage PO fluids, and use compression stockings as outpatient. Orthostatics negative.Patient will need to follow up with cardiologist Dr. Ross outpatient.    Patient is medically stable for discharge.

## 2023-11-08 NOTE — DISCHARGE NOTE PROVIDER - NSDCMRMEDTOKEN_GEN_ALL_CORE_FT
Calcium 600+D oral tablet: 1 tab(s) orally once a day (at bedtime)  clonazePAM 0.25 mg oral tablet: orally 2 times a day  Fish Oil 1200 mg oral capsule: 1 cap(s) orally once a day  Multiple Vitamins oral capsule: 1 cap(s) orally once a day  PARoxetine 30 mg oral tablet: 1 tab(s) orally once a day  pravastatin 20 mg oral tablet: 1 tab(s) orally once a day  tamsulosin 0.4 mg oral capsule: 1 cap(s) orally once a day  tumeric: orally once a day  ZyPREXA 5 mg oral tablet: 1 tab(s) orally once a day   losartan 100 mg oral tablet: 1 tab(s) orally once a day  PARoxetine 40 mg oral tablet: 1 tab(s) orally once a day  pravastatin 20 mg oral tablet: 1 tab(s) orally once a day  ZyPREXA 5 mg oral tablet: 1 tab(s) orally once a day

## 2023-11-08 NOTE — DISCHARGE NOTE PROVIDER - ATTENDING DISCHARGE PHYSICAL EXAMINATION:
GENERAL: elderly M, NAD, non toxic appearing  EYES: anicteric sclera, non injected conjunctiva, EOMI  ENT: hearing grossly intact  PSYCH: no agitation   NERVOUS SYSTEM:  Alert & Oriented X3   PULMONARY: symmetrical chest rise, no accessory muscle use  CARDIOVASCULAR: non tachycardic  GI:  Nondistended   EXTREMITIES:  No clubbing, cyanosis, or edema  SKIN: No rashes or lesions

## 2023-11-08 NOTE — DISCHARGE NOTE PROVIDER - CARE PROVIDER_API CALL
Uziel Castillo  Neurology  [address] [place] [address]  Phone: [phone]  Fax: [phone]  Established Patient  Follow Up Time: 1 week    Florencia Souza  Interventional Cardiology  [address] [place] [id number]  Phone: [phone]  Fax: [phone]  Established Patient  Follow Up Time: 1 week    Simón Engle  Psychiatry  [address] [place] [id number]  Phone: [phone]  Fax: [phone]  Established Patient  Follow Up Time: 2 weeks

## 2023-11-08 NOTE — PROGRESS NOTE ADULT - SUBJECTIVE AND OBJECTIVE BOX
24H events:    Patient is a 73y old Male who presents with a chief complaint of Tremors (07 Nov 2023 22:37)    Primary diagnosis of Pre-syncope    Today is hospital day 2d. This morning patient was seen and examined at bedside, resting comfortably in bed.    No acute or major events overnight.    PAST MEDICAL & SURGICAL HISTORY  Hypertension    BPH (benign prostatic hyperplasia)    Skin cancer    Anxiety    Depression    Hypercholesteremia    Status post Mohs surgery    Inguinal hernia  left 2 years ago      SOCIAL HISTORY:  Social History:      ALLERGIES:  No Known Allergies    MEDICATIONS:  STANDING MEDICATIONS  atorvastatin 10 milliGRAM(s) Oral at bedtime  enoxaparin Injectable 40 milliGRAM(s) SubCutaneous every 24 hours  tamsulosin 0.4 milliGRAM(s) Oral at bedtime    PRN MEDICATIONS    VITALS:   T(F): 96.4  HR: 66  BP: 158/65  RR: 18  SpO2: 98%    PHYSICAL EXAM:  GENERAL:   (X  ) NAD, lying in bed comfortably     (  ) obtunded     (  ) lethargic     (  ) somnolent    HEAD:   ( X ) Atraumatic     (  ) hematoma     (  ) laceration (specify location:       )     NECK:  ( X ) Supple     (  ) neck stiffness     (  ) nuchal rigidity     (  )  no JVD     (  ) JVD present ( -- cm)    HEART:  Rate -->     ( X ) normal rate     (  ) bradycardic     (  ) tachycardic  Rhythm -->     ( X ) regular     (  ) regularly irregular     (  ) irregularly irregular  Murmurs -->     (  ) normal s1s2     (  ) systolic murmur     (  ) diastolic murmur     (  ) continuous murmur      (  ) S3 present     (  ) S4 present    LUNGS:   ( X ) Unlabored respirations     (  ) tachypnea  ( X ) B/L air entry     (  ) decreased breath sounds in:  (location     )    (  ) no adventitious sound     (  ) crackles     (  ) wheezing      (  ) rhonchi      (specify location:       )  (  ) chest wall tenderness (specify location:       )    ABDOMEN:   ( X ) Soft     (  ) tense   |   (  ) nondistended     (  ) distended   |   (  ) +BS     (  ) hypoactive bowel sounds     (  ) hyperactive bowel sounds  ( X ) nontender     (  ) RUQ tenderness     (  ) RLQ tenderness     (  ) LLQ tenderness     (  ) epigastric tenderness     (  ) diffuse tenderness  (  ) Splenomegaly      (  ) Hepatomegaly      (  ) Jaundice     (  ) ecchymosis     EXTREMITIES:  ( X ) Normal     (  ) Rash     (  ) ecchymosis     (  ) varicose veins      (  ) pitting edema     (  ) non-pitting edema   (  ) ulceration     (  ) gangrene:     (location:     )    NERVOUS SYSTEM:    ( X ) A&Ox3     (  ) confused     (  ) lethargic  CN II-XII:     (  ) Intact     (  ) deficits found     (Specify:     )   Upper extremities:     (  ) no sensorimotor deficits     (  ) weakness     (  ) loss of proprioception/vibration     (  ) loss of touch/temperature (specify:    )  Lower extremities:     (  ) no sensorimotor deficits     (  ) weakness     (  ) loss of proprioception/vibration     (  ) loss of touch/temperature (specify:    )    SKIN:   (X  ) No rashes or lesions     (  ) maculopapular rash     (  ) pustules     (  ) vesicles     (  ) ulcer     (  ) ecchymosis     (specify location:     )    LABS:                        11.5   7.95  )-----------( 241      ( 07 Nov 2023 07:31 )             34.5     11-07    136  |  100  |  23<H>  ----------------------------<  89  4.6   |  26  |  1.1    Ca    9.2      07 Nov 2023 07:31  Phos  3.5     11-07  Mg     2.4     11-07    TPro  6.5  /  Alb  4.2  /  TBili  0.4  /  DBili  x   /  AST  23  /  ALT  14  /  AlkPhos  94  11-06      Urinalysis Basic - ( 07 Nov 2023 07:31 )    Color: x / Appearance: x / SG: x / pH: x  Gluc: 89 mg/dL / Ketone: x  / Bili: x / Urobili: x   Blood: x / Protein: x / Nitrite: x   Leuk Esterase: x / RBC: x / WBC x   Sq Epi: x / Non Sq Epi: x / Bacteria: x            CARDIAC MARKERS ( 06 Nov 2023 13:00 )  x     / <0.01 ng/mL / x     / x     / x

## 2023-11-08 NOTE — PROGRESS NOTE ADULT - ASSESSMENT
Patient is a 72yo M with PMHx (based on reported medications) of HTN, DL, BPH, depressive psychosis (olanzapine) presented on 11/6 for shaking. Earlier that day patient was being evaluated at cardiology office Dr Ross for placement of a loop recorder however he experienced dizziness followed by twisting and shaking movement of b/l arms and fell to the ground due to how violent the shaking was.    #Recurrent tremors and body shaking -  #Hx of depressive psychosis   - unsure etiology; but less likely extrapyramidal symptoms of an atypical antipsychotic  - possible pseudoseizures?  -CTH: 1.  No acute intracranial hemorrhage or acute large territorial infarct. 2.  Cerebral and cerebellar atrophy. 3.  Periventricular white matter hypodensities, nonspecific, differential diagnostic possibilities include ischemic change, gliosis or demyelination. 4.  Supratentorial and infratentorial calcifications may be idiopathic be secondary to an underlying endocrinopathy, Fahr disease or previous infection or radiation.  -Neuro eval and recs in the ED -->Less likely seizure, possible dyskinesia (possibly tardive dyskinesia) 2/2 olanzapine.  - EEG unremarkable  - o/p neurology follow up  - no events on tele so far    #HTN  #DL  #AR  -TTE 10/2023:  EF of 55 %. Moderate AR due to either bicuspid AV vs restricted left coronary cusp. The jet is eccentric and may be underestimated. Consider DELMA to better evaluate aortic valve, if clinically indicated.  -CXR: No radiographic evidence of acute cardiopulmonary disease.  - pro BNP 1.8K, no hx of HF  - no fluid overload on exam  - o/p loop recorder with cardiology Dr ross  - he stopped home losartan 11/6 for low BP as per Dr Ross's recs  - cw home statin  - cardio consult- obtain orthostatic BP, hold BP meds, encourage PO fluids, compression stockings as outpatient  - orthostatic BP negative      #MISC  - DVT PPx: lovenox 40 sc qd  - GI PPx: low risk  - Diet: Regular DASH  - Activity: IAT  - Labs: ordered  - Code: Full code      #Progress Note Handoff  Pending (specify):  setting home HHA services at family request. PT request placed  Family discussion: updated  Disposition: Home with HHA services. will need follow up with Neuro and Dr. Ross for ongoing work up 
72yo M PMHx (based on reported medications) of HTN, DL, BPH, depressive psychosis (olanzapine) presenting for shaking. Earlier today patient was being evaluated at cardiology office Dr Ross for placement of a loop recorder however he experienced dizziness followed by twisting and shaking movement of b/l arms and fell to the ground due to how violent the shaking was.    #Recurrent tremors and body shaking -  #Hx of depressive psychosis   - unsure etiology; but less likely extrapyramidal symptoms of an atypical antipsychotic  - possible pseudoseizures?  -CTH: 1.  No acute intracranial hemorrhage or acute large territorial infarct. 2.  Cerebral and cerebellar atrophy. 3.  Periventricular white matter hypodensities, nonspecific, differential diagnostic possibilities include ischemic change, gliosis or demyelination.  4.  Supratentorial and infratentorial calcifications may be idiopathic be secondary to an underlying endocrinopathy, Fahr disease or previous infection or radiation.  -Neuro eval and recs in the ED -->Less likely seizure, possible dyskinesia (possibly tardive dyskinesia) 2/2 olanzapine.  - EEG unremarkable  - o/p neurology follow up  - no events on tele so far    #HTN  #DL  #AR  -TTE 10/2023:  EF of 55 %. Moderate AR due to either bicuspid AV vs restricted left coronary cusp. The jet is eccentric and may be underestimated. Consider DELMA to better evaluate aortic valve, if clinically indicated.  -CXR: No radiographic evidence of acute cardiopulmonary disease.  - pro BNP 1.8K, no hx of HF  - no fluid overload on exam  - o/p loop recorder with cardiology Dr ross  - he stopped home losartan today for low BP as per Dr Ross's recs  - cw home statin      #MISC  - DVT PPx: lovenox 40 sc qd  - GI PPx: low risk  - Diet: Regular DASH  - Activity: IAT  - Labs: ordered  - Code: Full code      #Progress Note Handoff  Pending (specify):  setting home HHA services at family request. PT request placed  Family discussion: updated  Disposition: Home with HHA services. will need follow up with Neuro and Dr. Ross for ongoing work up

## 2023-11-08 NOTE — DISCHARGE NOTE PROVIDER - NSDCCPCAREPLAN_GEN_ALL_CORE_FT
PRINCIPAL DISCHARGE DIAGNOSIS  Diagnosis: Pre-syncope  Assessment and Plan of Treatment: You were admitted to the hospital after syncope.   Syncope is when you temporarily lose consciousness, also called fainting or passing out. It is caused by a sudden decrease in blood flow to the brain. Even though most causes of syncope are not dangerous, syncope can possibly be a sign of a serious medical problem. Signs that you may be about to faint include feeling dizzy, lightheaded, nausea, visual changes, or cold/clammy skin. Do not drive, operate heavy machinery, or play sports until your health care provider says it is okay.  You must follow up with Dr. Castillo with neurology for further evaluation, and with Dr. Souza your cardiologist. We have STOPPED your home blood pressure medication losartan.   Please also follow up with your psychiatrist Dr. Engle.   SEEK IMMEDIATE MEDICAL CARE IF YOU HAVE ANY OF THE FOLLOWING SYMPTOMS: severe headache, pain in your chest/abdomen/back, bleeding from your mouth or rectum, palpitations, shortness of breath, pain with breathing, seizure, confusion, or trouble walking.        SECONDARY DISCHARGE DIAGNOSES  Diagnosis: Shakiness  Assessment and Plan of Treatment:     
has at home  wife is hcp/done

## 2023-11-08 NOTE — PHYSICAL THERAPY INITIAL EVALUATION ADULT - PLANNED THERAPY INTERVENTIONS, PT EVAL
Patient able and agreeable to ambulate on daily basis with RN/PCA to maintain strength and mobility. Goals/therex not indicated at this time.

## 2023-11-08 NOTE — DISCHARGE NOTE PROVIDER - PROVIDER TOKENS
PROVIDER:[TOKEN:[[id number]],FOLLOWUP:[1 week],ESTABLISHEDPATIENT:[T]],PROVIDER:[TOKEN:[[id number]],FOLLOWUP:[1 week],ESTABLISHEDPATIENT:[T]],PROVIDER:[TOKEN:[[id number]],FOLLOWUP:[2 weeks],ESTABLISHEDPATIENT:[T]]

## 2023-11-08 NOTE — DISCHARGE NOTE PROVIDER - TIME SPENT: (MINUTES SPENT ON THE DISCHARGE SERVICE)
"      SPORTS & ORTHOPEDIC WALK-IN VISIT 11/19/2018    Primary Care Physician: Dr. Walker  11/10/18 fell on outstretched hand in restaurant. Didn't notice pain until a few days later. Pain at base of thumb. Using brace that had for R wrist from carpal tunnel, which seems to help slightly but still having pain with moving thumb. Has not had swelling or bruising. No tingling or numbness. Has not tried ice, Tylenol, or NSAIDS.    Reason for visit:     What part of your body is injured / painful?  left hand    What caused the injury /pain? Fall    How long ago did your injury occur or pain begin? several days ago    What are your most bothersome symptoms? Pain    How would you characterize your symptom?  aching    What makes your symptoms better? Rest and Wrap or brace    What makes your symptoms worse? Other: grabbing    Have you been previously seen for this problem? No    Medical History:    Any recent changes to your medical history? No    Any new medication prescribed since last visit? No    Have you had surgery on this body part before? No    Social History:    Occupation: [employer]    Handedness: Right    Exercise: 3-4 days/week    Review of Systems:    Do you have fever, chills, weight loss? No    Do you have any vision problems? No    Do you have any chest pain or edema? No    Do you have any shortness of breath or wheezing?  No    Do you have stomach problems? No    Do you have any numbness or focal weakness? No    Do you have diabetes? No    Do you have problems with bleeding or clotting? No    Do you have an rashes or other skin lesions? No       Past Medical History, Current Medications, and Allergies are reviewed in the electronic medical record as appropriate.       EXAM:/[id number]  Pulse 80  Ht 1.575 m (5' 2\")  Wt 65.8 kg (145 lb)  BMI 26.52 kg/m2    Exam:  Patient is alert, in no acute distress, pleasant and conversational    leftwrist/hand:  Skin intact, no erythema, rash, or ecchymosis. No skin breakdown "   No soft tissue swelling or joint effusion of the wrist or hand. No atrophy of thenar or hypothenar emminances.     Range of Motion:  Wrist AROM: Flexion: Approximately 90 , Extension: Approximately 60 , Supination: Approximately 90 , Pronation Approximately 90 .  Pain with wrist extension and flexion  Hand AROM: Full flexion and extension of the DIP, PIP and MCP joints of digits #2-5., Thumb with full flexion and extension of IP and MCP joints, opposition, abduction and adduction intact.      Strength Testing:  Wrist: Extension: 5/5, Flexion: 5/5, Supination: 5/5, Pronation: 5/5, Ulnar deviation: 5/5, Radial deviation: 5/5  Hand: Full strength with flexion, extension, abduction and adduction of the phalanges #2-5, Full strength with flexion/extension, abduction, adduction and opposition of the thumb    Palpation:  positive  tenderness to palpation at the anatomic snuffbox  positive  tenderness with compression loading of the scaphoid  negative tenderness to palpation of the other carpal bones  negative tenderness to palpation of the metacarpals  negative tenderness to palpation of the phalanges    negative tenderness to palpation of the distal radius or distal ulna  negative tenderness to palpation of the radial head    negative carpal instability on exam     +2/4 radial pulse, less than 2 second capillary refill  Sensation is intact throughout the hand to light touch and pinprick testing      Imaging: xrays of left hand performed and reviewed independently demonstrating very subtle irregularity of scaphoid, possibly fx given her MONROE and localization of pain.    Assessment: Patient is a 41 year old female with left wrist pain and concern for scaphoid fracture.     Recommendations:   Reviewed imaging and assessment with patient in detail  Recommended thumb spica splint with follow up in 1-2 weeks for reassessment and reimaging.   Patient understanding and in agreement with plan.     Dionte Ortega,  MD                   35

## 2023-11-08 NOTE — PROGRESS NOTE ADULT - SUBJECTIVE AND OBJECTIVE BOX
SUBJ:      MEDICATIONS  (STANDING):  atorvastatin 10 milliGRAM(s) Oral at bedtime  enoxaparin Injectable 40 milliGRAM(s) SubCutaneous every 24 hours  tamsulosin 0.4 milliGRAM(s) Oral at bedtime    MEDICATIONS  (PRN):            Vital Signs Last 24 Hrs  T(C): 35.9 (08 Nov 2023 12:28), Max: 36.4 (07 Nov 2023 20:22)  T(F): 96.6 (08 Nov 2023 12:28), Max: 97.5 (07 Nov 2023 20:22)  HR: 66 (08 Nov 2023 12:28) (65 - 66)  BP: 188/73 (08 Nov 2023 12:28) (138/68 - 188/73)  BP(mean): --  RR: 18 (08 Nov 2023 12:28) (18 - 18)  SpO2: 98% (07 Nov 2023 20:22) (98% - 98%)         REVIEW OF SYSTEMS:  CONSTITUTIONAL: No fever, weight loss, or fatigue  CARDIOLOGY: PAtient denies chest pain, shortness of breath or syncopal episodes.   RESPIRATORY: denies shortness of breath, wheezeing.   NEUROLOGICAL: NO weakness, no focal deficits to report.  GI: no BRBPR, no N,V,diarrhea.    PSYCHIATRY: normal mood and affect  HEENT: no nasal discharge, no ecchymosis  SKIN: no ecchymosis, no breakdown  MUSCULOSKELETAL: Full range of motion x4.        PHYSICAL EXAM:  · CONSTITUTIONAL:	Well-developed, well nourished    BMI-  ·RESPIRATORY:   airway patent; breath sounds equal; good air movement; respirations non-labored; clear to auscultation bilaterally; no chest wall tenderness; no intercostal retractions; no rales,rhonchi or wheeze  · CARDIOVASCULAR	regular rate and rhythm  no rub  no murmur  normal PMI  · EXTREMITIES: No cyanosis, clubbing or edema  · VASCULAR: 	Equal and normal pulses (carotid, femoral, dorsalis pedis)  	  TELEMETRY:    ECG:    TTE:    LABS:                        11.1   8.22  )-----------( 214      ( 08 Nov 2023 11:25 )             33.7     11-08    135  |  102  |  23<H>  ----------------------------<  84  4.7   |  26  |  0.9    Ca    9.2      08 Nov 2023 11:25  Phos  3.5     11-07  Mg     2.2     11-08              I&O's Summary    08 Nov 2023 07:01  -  08 Nov 2023 15:09  --------------------------------------------------------  IN: 640 mL / OUT: 0 mL / NET: 640 mL      BNP  RADIOLOGY & ADDITIONAL STUDIES:    IMPRESSION AND PLAN:

## 2023-11-09 ENCOUNTER — NON-APPOINTMENT (OUTPATIENT)
Age: 73
End: 2023-11-09

## 2023-11-13 DIAGNOSIS — N40.0 BENIGN PROSTATIC HYPERPLASIA WITHOUT LOWER URINARY TRACT SYMPTOMS: ICD-10-CM

## 2023-11-13 DIAGNOSIS — F41.9 ANXIETY DISORDER, UNSPECIFIED: ICD-10-CM

## 2023-11-13 DIAGNOSIS — G24.01 DRUG INDUCED SUBACUTE DYSKINESIA: ICD-10-CM

## 2023-11-13 DIAGNOSIS — I10 ESSENTIAL (PRIMARY) HYPERTENSION: ICD-10-CM

## 2023-11-13 DIAGNOSIS — Z85.820 PERSONAL HISTORY OF MALIGNANT MELANOMA OF SKIN: ICD-10-CM

## 2023-11-13 DIAGNOSIS — F32.A DEPRESSION, UNSPECIFIED: ICD-10-CM

## 2023-11-13 DIAGNOSIS — I35.1 NONRHEUMATIC AORTIC (VALVE) INSUFFICIENCY: ICD-10-CM

## 2023-11-13 DIAGNOSIS — E78.00 PURE HYPERCHOLESTEROLEMIA, UNSPECIFIED: ICD-10-CM

## 2023-11-13 DIAGNOSIS — T43.595A ADVERSE EFFECT OF OTHER ANTIPSYCHOTICS AND NEUROLEPTICS, INITIAL ENCOUNTER: ICD-10-CM

## 2023-11-22 ENCOUNTER — OUTPATIENT (OUTPATIENT)
Dept: OUTPATIENT SERVICES | Facility: HOSPITAL | Age: 73
LOS: 1 days | End: 2023-11-22
Payer: MEDICARE

## 2023-11-22 ENCOUNTER — APPOINTMENT (OUTPATIENT)
Dept: PSYCHIATRY | Facility: CLINIC | Age: 73
End: 2023-11-22
Payer: MEDICARE

## 2023-11-22 DIAGNOSIS — K40.90 UNILATERAL INGUINAL HERNIA, WITHOUT OBSTRUCTION OR GANGRENE, NOT SPECIFIED AS RECURRENT: Chronic | ICD-10-CM

## 2023-11-22 DIAGNOSIS — F33.3 MAJOR DEPRESSIVE DISORDER, RECURRENT, SEVERE WITH PSYCHOTIC SYMPTOMS: ICD-10-CM

## 2023-11-22 DIAGNOSIS — Z98.890 OTHER SPECIFIED POSTPROCEDURAL STATES: Chronic | ICD-10-CM

## 2023-11-22 PROCEDURE — 99214 OFFICE O/P EST MOD 30 MIN: CPT | Mod: 95

## 2023-11-22 PROCEDURE — 99443: CPT

## 2023-11-22 RX ORDER — PAROXETINE HYDROCHLORIDE 40 MG/1
40 TABLET, FILM COATED ORAL
Qty: 90 | Refills: 0 | Status: ACTIVE | COMMUNITY
Start: 2020-12-01 | End: 1900-01-01

## 2023-11-22 RX ORDER — OLANZAPINE 2.5 MG/1
2.5 TABLET, FILM COATED ORAL
Qty: 30 | Refills: 1 | Status: ACTIVE | COMMUNITY
Start: 2020-11-19 | End: 1900-01-01

## 2023-11-23 DIAGNOSIS — F33.3 MAJOR DEPRESSIVE DISORDER, RECURRENT, SEVERE WITH PSYCHOTIC SYMPTOMS: ICD-10-CM

## 2024-01-01 ENCOUNTER — NON-APPOINTMENT (OUTPATIENT)
Age: 74
End: 2024-01-01

## 2024-01-02 ENCOUNTER — INPATIENT (INPATIENT)
Facility: HOSPITAL | Age: 74
LOS: 5 days | Discharge: NURSING, REHAB OR LONG-TERM CARE | DRG: 682 | End: 2024-01-08
Attending: HOSPITALIST | Admitting: FAMILY MEDICINE
Payer: MEDICARE

## 2024-01-02 VITALS
DIASTOLIC BLOOD PRESSURE: 58 MMHG | WEIGHT: 130.07 LBS | RESPIRATION RATE: 18 BRPM | HEIGHT: 66 IN | TEMPERATURE: 98 F | OXYGEN SATURATION: 100 % | HEART RATE: 71 BPM | SYSTOLIC BLOOD PRESSURE: 142 MMHG

## 2024-01-02 DIAGNOSIS — K40.90 UNILATERAL INGUINAL HERNIA, WITHOUT OBSTRUCTION OR GANGRENE, NOT SPECIFIED AS RECURRENT: Chronic | ICD-10-CM
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
## 2024-01-02 DIAGNOSIS — Z98.890 OTHER SPECIFIED POSTPROCEDURAL STATES: Chronic | ICD-10-CM

## 2024-01-02 LAB
ABO RH CONFIRMATION: SIGNIFICANT CHANGE UP
ABO RH CONFIRMATION: SIGNIFICANT CHANGE UP
ALBUMIN SERPL ELPH-MCNC: 3.8 G/DL — SIGNIFICANT CHANGE UP (ref 3.5–5.2)
ALBUMIN SERPL ELPH-MCNC: 3.8 G/DL — SIGNIFICANT CHANGE UP (ref 3.5–5.2)
ALP SERPL-CCNC: 102 U/L — SIGNIFICANT CHANGE UP (ref 30–115)
ALP SERPL-CCNC: 102 U/L — SIGNIFICANT CHANGE UP (ref 30–115)
ALT FLD-CCNC: 39 U/L — SIGNIFICANT CHANGE UP (ref 0–41)
ALT FLD-CCNC: 39 U/L — SIGNIFICANT CHANGE UP (ref 0–41)
ANION GAP SERPL CALC-SCNC: 13 MMOL/L — SIGNIFICANT CHANGE UP (ref 7–14)
ANION GAP SERPL CALC-SCNC: 13 MMOL/L — SIGNIFICANT CHANGE UP (ref 7–14)
ANION GAP SERPL CALC-SCNC: 15 MMOL/L — HIGH (ref 7–14)
ANION GAP SERPL CALC-SCNC: 15 MMOL/L — HIGH (ref 7–14)
APTT BLD: 26.8 SEC — LOW (ref 27–39.2)
APTT BLD: 26.8 SEC — LOW (ref 27–39.2)
AST SERPL-CCNC: 85 U/L — HIGH (ref 0–41)
AST SERPL-CCNC: 85 U/L — HIGH (ref 0–41)
BASOPHILS # BLD AUTO: 0.02 K/UL — SIGNIFICANT CHANGE UP (ref 0–0.2)
BASOPHILS # BLD AUTO: 0.02 K/UL — SIGNIFICANT CHANGE UP (ref 0–0.2)
BASOPHILS NFR BLD AUTO: 0.1 % — SIGNIFICANT CHANGE UP (ref 0–1)
BASOPHILS NFR BLD AUTO: 0.1 % — SIGNIFICANT CHANGE UP (ref 0–1)
BILIRUB SERPL-MCNC: 2.3 MG/DL — HIGH (ref 0.2–1.2)
BILIRUB SERPL-MCNC: 2.3 MG/DL — HIGH (ref 0.2–1.2)
BLD GP AB SCN SERPL QL: SIGNIFICANT CHANGE UP
BLD GP AB SCN SERPL QL: SIGNIFICANT CHANGE UP
BUN SERPL-MCNC: 64 MG/DL — CRITICAL HIGH (ref 10–20)
BUN SERPL-MCNC: 64 MG/DL — CRITICAL HIGH (ref 10–20)
BUN SERPL-MCNC: 66 MG/DL — CRITICAL HIGH (ref 10–20)
BUN SERPL-MCNC: 66 MG/DL — CRITICAL HIGH (ref 10–20)
CALCIUM SERPL-MCNC: 8.4 MG/DL — SIGNIFICANT CHANGE UP (ref 8.4–10.5)
CALCIUM SERPL-MCNC: 8.4 MG/DL — SIGNIFICANT CHANGE UP (ref 8.4–10.5)
CALCIUM SERPL-MCNC: 8.8 MG/DL — SIGNIFICANT CHANGE UP (ref 8.4–10.5)
CALCIUM SERPL-MCNC: 8.8 MG/DL — SIGNIFICANT CHANGE UP (ref 8.4–10.5)
CHLORIDE SERPL-SCNC: 101 MMOL/L — SIGNIFICANT CHANGE UP (ref 98–110)
CHLORIDE SERPL-SCNC: 101 MMOL/L — SIGNIFICANT CHANGE UP (ref 98–110)
CHLORIDE SERPL-SCNC: 102 MMOL/L — SIGNIFICANT CHANGE UP (ref 98–110)
CHLORIDE SERPL-SCNC: 102 MMOL/L — SIGNIFICANT CHANGE UP (ref 98–110)
CK SERPL-CCNC: 1928 U/L — HIGH (ref 0–225)
CK SERPL-CCNC: 1928 U/L — HIGH (ref 0–225)
CO2 SERPL-SCNC: 18 MMOL/L — SIGNIFICANT CHANGE UP (ref 17–32)
CO2 SERPL-SCNC: 18 MMOL/L — SIGNIFICANT CHANGE UP (ref 17–32)
CO2 SERPL-SCNC: 21 MMOL/L — SIGNIFICANT CHANGE UP (ref 17–32)
CO2 SERPL-SCNC: 21 MMOL/L — SIGNIFICANT CHANGE UP (ref 17–32)
CREAT SERPL-MCNC: 2.5 MG/DL — HIGH (ref 0.7–1.5)
CREAT SERPL-MCNC: 2.5 MG/DL — HIGH (ref 0.7–1.5)
CREAT SERPL-MCNC: 2.6 MG/DL — HIGH (ref 0.7–1.5)
CREAT SERPL-MCNC: 2.6 MG/DL — HIGH (ref 0.7–1.5)
EGFR: 25 ML/MIN/1.73M2 — LOW
EGFR: 25 ML/MIN/1.73M2 — LOW
EGFR: 26 ML/MIN/1.73M2 — LOW
EGFR: 26 ML/MIN/1.73M2 — LOW
EOSINOPHIL # BLD AUTO: 0.01 K/UL — SIGNIFICANT CHANGE UP (ref 0–0.7)
EOSINOPHIL # BLD AUTO: 0.01 K/UL — SIGNIFICANT CHANGE UP (ref 0–0.7)
EOSINOPHIL NFR BLD AUTO: 0.1 % — SIGNIFICANT CHANGE UP (ref 0–8)
EOSINOPHIL NFR BLD AUTO: 0.1 % — SIGNIFICANT CHANGE UP (ref 0–8)
GLUCOSE SERPL-MCNC: 108 MG/DL — HIGH (ref 70–99)
GLUCOSE SERPL-MCNC: 108 MG/DL — HIGH (ref 70–99)
GLUCOSE SERPL-MCNC: 97 MG/DL — SIGNIFICANT CHANGE UP (ref 70–99)
GLUCOSE SERPL-MCNC: 97 MG/DL — SIGNIFICANT CHANGE UP (ref 70–99)
HCT VFR BLD CALC: 20.7 % — LOW (ref 42–52)
HCT VFR BLD CALC: 20.7 % — LOW (ref 42–52)
HCT VFR BLD CALC: 22.1 % — LOW (ref 42–52)
HCT VFR BLD CALC: 22.1 % — LOW (ref 42–52)
HGB BLD-MCNC: 7 G/DL — LOW (ref 14–18)
HGB BLD-MCNC: 7 G/DL — LOW (ref 14–18)
HGB BLD-MCNC: 7.6 G/DL — LOW (ref 14–18)
HGB BLD-MCNC: 7.6 G/DL — LOW (ref 14–18)
IMM GRANULOCYTES NFR BLD AUTO: 0.9 % — HIGH (ref 0.1–0.3)
IMM GRANULOCYTES NFR BLD AUTO: 0.9 % — HIGH (ref 0.1–0.3)
INR BLD: 1.03 RATIO — SIGNIFICANT CHANGE UP (ref 0.65–1.3)
INR BLD: 1.03 RATIO — SIGNIFICANT CHANGE UP (ref 0.65–1.3)
LACTATE SERPL-SCNC: 1.3 MMOL/L — SIGNIFICANT CHANGE UP (ref 0.7–2)
LACTATE SERPL-SCNC: 1.3 MMOL/L — SIGNIFICANT CHANGE UP (ref 0.7–2)
LIDOCAIN IGE QN: 26 U/L — SIGNIFICANT CHANGE UP (ref 7–60)
LIDOCAIN IGE QN: 26 U/L — SIGNIFICANT CHANGE UP (ref 7–60)
LYMPHOCYTES # BLD AUTO: 1.44 K/UL — SIGNIFICANT CHANGE UP (ref 1.2–3.4)
LYMPHOCYTES # BLD AUTO: 1.44 K/UL — SIGNIFICANT CHANGE UP (ref 1.2–3.4)
LYMPHOCYTES # BLD AUTO: 9.1 % — LOW (ref 20.5–51.1)
LYMPHOCYTES # BLD AUTO: 9.1 % — LOW (ref 20.5–51.1)
MCHC RBC-ENTMCNC: 30.3 PG — SIGNIFICANT CHANGE UP (ref 27–31)
MCHC RBC-ENTMCNC: 30.3 PG — SIGNIFICANT CHANGE UP (ref 27–31)
MCHC RBC-ENTMCNC: 30.6 PG — SIGNIFICANT CHANGE UP (ref 27–31)
MCHC RBC-ENTMCNC: 30.6 PG — SIGNIFICANT CHANGE UP (ref 27–31)
MCHC RBC-ENTMCNC: 33.8 G/DL — SIGNIFICANT CHANGE UP (ref 32–37)
MCHC RBC-ENTMCNC: 33.8 G/DL — SIGNIFICANT CHANGE UP (ref 32–37)
MCHC RBC-ENTMCNC: 34.4 G/DL — SIGNIFICANT CHANGE UP (ref 32–37)
MCHC RBC-ENTMCNC: 34.4 G/DL — SIGNIFICANT CHANGE UP (ref 32–37)
MCV RBC AUTO: 89.1 FL — SIGNIFICANT CHANGE UP (ref 80–94)
MCV RBC AUTO: 89.1 FL — SIGNIFICANT CHANGE UP (ref 80–94)
MCV RBC AUTO: 89.6 FL — SIGNIFICANT CHANGE UP (ref 80–94)
MCV RBC AUTO: 89.6 FL — SIGNIFICANT CHANGE UP (ref 80–94)
MONOCYTES # BLD AUTO: 1.21 K/UL — HIGH (ref 0.1–0.6)
MONOCYTES # BLD AUTO: 1.21 K/UL — HIGH (ref 0.1–0.6)
MONOCYTES NFR BLD AUTO: 7.6 % — SIGNIFICANT CHANGE UP (ref 1.7–9.3)
MONOCYTES NFR BLD AUTO: 7.6 % — SIGNIFICANT CHANGE UP (ref 1.7–9.3)
NEUTROPHILS # BLD AUTO: 13 K/UL — HIGH (ref 1.4–6.5)
NEUTROPHILS # BLD AUTO: 13 K/UL — HIGH (ref 1.4–6.5)
NEUTROPHILS NFR BLD AUTO: 82.2 % — HIGH (ref 42.2–75.2)
NEUTROPHILS NFR BLD AUTO: 82.2 % — HIGH (ref 42.2–75.2)
NRBC # BLD: 0 /100 WBCS — SIGNIFICANT CHANGE UP (ref 0–0)
PLATELET # BLD AUTO: 290 K/UL — SIGNIFICANT CHANGE UP (ref 130–400)
PLATELET # BLD AUTO: 290 K/UL — SIGNIFICANT CHANGE UP (ref 130–400)
PLATELET # BLD AUTO: 296 K/UL — SIGNIFICANT CHANGE UP (ref 130–400)
PLATELET # BLD AUTO: 296 K/UL — SIGNIFICANT CHANGE UP (ref 130–400)
PMV BLD: 9.7 FL — SIGNIFICANT CHANGE UP (ref 7.4–10.4)
PMV BLD: 9.7 FL — SIGNIFICANT CHANGE UP (ref 7.4–10.4)
PMV BLD: 9.8 FL — SIGNIFICANT CHANGE UP (ref 7.4–10.4)
PMV BLD: 9.8 FL — SIGNIFICANT CHANGE UP (ref 7.4–10.4)
POTASSIUM SERPL-MCNC: 4.6 MMOL/L — SIGNIFICANT CHANGE UP (ref 3.5–5)
POTASSIUM SERPL-MCNC: 4.6 MMOL/L — SIGNIFICANT CHANGE UP (ref 3.5–5)
POTASSIUM SERPL-MCNC: 5.3 MMOL/L — HIGH (ref 3.5–5)
POTASSIUM SERPL-MCNC: 5.3 MMOL/L — HIGH (ref 3.5–5)
POTASSIUM SERPL-SCNC: 4.6 MMOL/L — SIGNIFICANT CHANGE UP (ref 3.5–5)
POTASSIUM SERPL-SCNC: 4.6 MMOL/L — SIGNIFICANT CHANGE UP (ref 3.5–5)
POTASSIUM SERPL-SCNC: 5.3 MMOL/L — HIGH (ref 3.5–5)
POTASSIUM SERPL-SCNC: 5.3 MMOL/L — HIGH (ref 3.5–5)
PROT SERPL-MCNC: 6 G/DL — SIGNIFICANT CHANGE UP (ref 6–8)
PROT SERPL-MCNC: 6 G/DL — SIGNIFICANT CHANGE UP (ref 6–8)
PROTHROM AB SERPL-ACNC: 11.7 SEC — SIGNIFICANT CHANGE UP (ref 9.95–12.87)
PROTHROM AB SERPL-ACNC: 11.7 SEC — SIGNIFICANT CHANGE UP (ref 9.95–12.87)
RBC # BLD: 2.31 M/UL — LOW (ref 4.7–6.1)
RBC # BLD: 2.31 M/UL — LOW (ref 4.7–6.1)
RBC # BLD: 2.48 M/UL — LOW (ref 4.7–6.1)
RBC # BLD: 2.48 M/UL — LOW (ref 4.7–6.1)
RBC # FLD: 14.5 % — SIGNIFICANT CHANGE UP (ref 11.5–14.5)
RBC # FLD: 14.5 % — SIGNIFICANT CHANGE UP (ref 11.5–14.5)
RBC # FLD: 14.6 % — HIGH (ref 11.5–14.5)
RBC # FLD: 14.6 % — HIGH (ref 11.5–14.5)
SODIUM SERPL-SCNC: 134 MMOL/L — LOW (ref 135–146)
SODIUM SERPL-SCNC: 134 MMOL/L — LOW (ref 135–146)
SODIUM SERPL-SCNC: 136 MMOL/L — SIGNIFICANT CHANGE UP (ref 135–146)
SODIUM SERPL-SCNC: 136 MMOL/L — SIGNIFICANT CHANGE UP (ref 135–146)
WBC # BLD: 14.83 K/UL — HIGH (ref 4.8–10.8)
WBC # BLD: 14.83 K/UL — HIGH (ref 4.8–10.8)
WBC # BLD: 15.82 K/UL — HIGH (ref 4.8–10.8)
WBC # BLD: 15.82 K/UL — HIGH (ref 4.8–10.8)
WBC # FLD AUTO: 14.83 K/UL — HIGH (ref 4.8–10.8)
WBC # FLD AUTO: 14.83 K/UL — HIGH (ref 4.8–10.8)
WBC # FLD AUTO: 15.82 K/UL — HIGH (ref 4.8–10.8)
WBC # FLD AUTO: 15.82 K/UL — HIGH (ref 4.8–10.8)

## 2024-01-02 PROCEDURE — 86901 BLOOD TYPING SEROLOGIC RH(D): CPT

## 2024-01-02 PROCEDURE — 82308 ASSAY OF CALCITONIN: CPT

## 2024-01-02 PROCEDURE — 84443 ASSAY THYROID STIM HORMONE: CPT

## 2024-01-02 PROCEDURE — 83540 ASSAY OF IRON: CPT

## 2024-01-02 PROCEDURE — P9016: CPT

## 2024-01-02 PROCEDURE — 84165 PROTEIN E-PHORESIS SERUM: CPT

## 2024-01-02 PROCEDURE — 82728 ASSAY OF FERRITIN: CPT

## 2024-01-02 PROCEDURE — 86334 IMMUNOFIX E-PHORESIS SERUM: CPT

## 2024-01-02 PROCEDURE — 86900 BLOOD TYPING SEROLOGIC ABO: CPT

## 2024-01-02 PROCEDURE — 81003 URINALYSIS AUTO W/O SCOPE: CPT

## 2024-01-02 PROCEDURE — 82570 ASSAY OF URINE CREATININE: CPT

## 2024-01-02 PROCEDURE — 76775 US EXAM ABDO BACK WALL LIM: CPT | Mod: 26

## 2024-01-02 PROCEDURE — 86803 HEPATITIS C AB TEST: CPT

## 2024-01-02 PROCEDURE — 72125 CT NECK SPINE W/O DYE: CPT | Mod: 26,MA

## 2024-01-02 PROCEDURE — 71045 X-RAY EXAM CHEST 1 VIEW: CPT | Mod: 26

## 2024-01-02 PROCEDURE — 80053 COMPREHEN METABOLIC PANEL: CPT

## 2024-01-02 PROCEDURE — 82550 ASSAY OF CK (CPK): CPT

## 2024-01-02 PROCEDURE — 97110 THERAPEUTIC EXERCISES: CPT | Mod: GO

## 2024-01-02 PROCEDURE — 82652 VIT D 1 25-DIHYDROXY: CPT

## 2024-01-02 PROCEDURE — 71260 CT THORAX DX C+: CPT | Mod: 26,MA

## 2024-01-02 PROCEDURE — 82140 ASSAY OF AMMONIA: CPT

## 2024-01-02 PROCEDURE — 74177 CT ABD & PELVIS W/CONTRAST: CPT | Mod: 26,MA

## 2024-01-02 PROCEDURE — 99285 EMERGENCY DEPT VISIT HI MDM: CPT

## 2024-01-02 PROCEDURE — 84466 ASSAY OF TRANSFERRIN: CPT

## 2024-01-02 PROCEDURE — 82607 VITAMIN B-12: CPT

## 2024-01-02 PROCEDURE — 84156 ASSAY OF PROTEIN URINE: CPT

## 2024-01-02 PROCEDURE — 76775 US EXAM ABDO BACK WALL LIM: CPT

## 2024-01-02 PROCEDURE — 80048 BASIC METABOLIC PNL TOTAL CA: CPT

## 2024-01-02 PROCEDURE — 83521 IG LIGHT CHAINS FREE EACH: CPT

## 2024-01-02 PROCEDURE — 36430 TRANSFUSION BLD/BLD COMPNT: CPT

## 2024-01-02 PROCEDURE — 86850 RBC ANTIBODY SCREEN: CPT

## 2024-01-02 PROCEDURE — 88305 TISSUE EXAM BY PATHOLOGIST: CPT

## 2024-01-02 PROCEDURE — 82310 ASSAY OF CALCIUM: CPT

## 2024-01-02 PROCEDURE — 84300 ASSAY OF URINE SODIUM: CPT

## 2024-01-02 PROCEDURE — 83970 ASSAY OF PARATHORMONE: CPT

## 2024-01-02 PROCEDURE — 97535 SELF CARE MNGMENT TRAINING: CPT | Mod: GO

## 2024-01-02 PROCEDURE — 83550 IRON BINDING TEST: CPT

## 2024-01-02 PROCEDURE — 82746 ASSAY OF FOLIC ACID SERUM: CPT

## 2024-01-02 PROCEDURE — 97116 GAIT TRAINING THERAPY: CPT | Mod: GP

## 2024-01-02 PROCEDURE — 86780 TREPONEMA PALLIDUM: CPT

## 2024-01-02 PROCEDURE — 84155 ASSAY OF PROTEIN SERUM: CPT

## 2024-01-02 PROCEDURE — 82306 VITAMIN D 25 HYDROXY: CPT

## 2024-01-02 PROCEDURE — 72170 X-RAY EXAM OF PELVIS: CPT | Mod: 26

## 2024-01-02 PROCEDURE — 85027 COMPLETE CBC AUTOMATED: CPT

## 2024-01-02 PROCEDURE — 70450 CT HEAD/BRAIN W/O DYE: CPT | Mod: 26,MA

## 2024-01-02 PROCEDURE — 36415 COLL VENOUS BLD VENIPUNCTURE: CPT

## 2024-01-02 PROCEDURE — 88312 SPECIAL STAINS GROUP 1: CPT

## 2024-01-02 PROCEDURE — 97162 PT EVAL MOD COMPLEX 30 MIN: CPT | Mod: GP

## 2024-01-02 PROCEDURE — 84166 PROTEIN E-PHORESIS/URINE/CSF: CPT

## 2024-01-02 PROCEDURE — 97165 OT EVAL LOW COMPLEX 30 MIN: CPT | Mod: GO

## 2024-01-02 RX ORDER — PANTOPRAZOLE SODIUM 20 MG/1
40 TABLET, DELAYED RELEASE ORAL
Refills: 0 | Status: DISCONTINUED | OUTPATIENT
Start: 2024-01-02 | End: 2024-01-08

## 2024-01-02 RX ORDER — OLANZAPINE 15 MG/1
1 TABLET, FILM COATED ORAL
Qty: 0 | Refills: 0 | DISCHARGE

## 2024-01-02 RX ORDER — TAMSULOSIN HYDROCHLORIDE 0.4 MG/1
0.4 CAPSULE ORAL AT BEDTIME
Refills: 0 | Status: DISCONTINUED | OUTPATIENT
Start: 2024-01-02 | End: 2024-01-08

## 2024-01-02 RX ORDER — SODIUM CHLORIDE 9 MG/ML
1000 INJECTION INTRAMUSCULAR; INTRAVENOUS; SUBCUTANEOUS
Refills: 0 | Status: DISCONTINUED | OUTPATIENT
Start: 2024-01-02 | End: 2024-01-03

## 2024-01-02 RX ORDER — OLANZAPINE 15 MG/1
5 TABLET, FILM COATED ORAL DAILY
Refills: 0 | Status: DISCONTINUED | OUTPATIENT
Start: 2024-01-02 | End: 2024-01-02

## 2024-01-02 RX ORDER — FERROUS SULFATE 325(65) MG
325 TABLET ORAL DAILY
Refills: 0 | Status: DISCONTINUED | OUTPATIENT
Start: 2024-01-02 | End: 2024-01-08

## 2024-01-02 RX ORDER — ATORVASTATIN CALCIUM 80 MG/1
10 TABLET, FILM COATED ORAL AT BEDTIME
Refills: 0 | Status: DISCONTINUED | OUTPATIENT
Start: 2024-01-02 | End: 2024-01-08

## 2024-01-02 RX ORDER — ACETAMINOPHEN 500 MG
650 TABLET ORAL EVERY 6 HOURS
Refills: 0 | Status: DISCONTINUED | OUTPATIENT
Start: 2024-01-02 | End: 2024-01-08

## 2024-01-02 RX ORDER — LOSARTAN POTASSIUM 100 MG/1
1 TABLET, FILM COATED ORAL
Qty: 0 | Refills: 0 | DISCHARGE

## 2024-01-02 RX ORDER — OLANZAPINE 15 MG/1
2.5 TABLET, FILM COATED ORAL AT BEDTIME
Refills: 0 | Status: DISCONTINUED | OUTPATIENT
Start: 2024-01-02 | End: 2024-01-08

## 2024-01-02 RX ADMIN — OLANZAPINE 2.5 MILLIGRAM(S): 15 TABLET, FILM COATED ORAL at 22:11

## 2024-01-02 RX ADMIN — SODIUM CHLORIDE 75 MILLILITER(S): 9 INJECTION INTRAMUSCULAR; INTRAVENOUS; SUBCUTANEOUS at 20:45

## 2024-01-02 RX ADMIN — ATORVASTATIN CALCIUM 10 MILLIGRAM(S): 80 TABLET, FILM COATED ORAL at 22:11

## 2024-01-02 RX ADMIN — TAMSULOSIN HYDROCHLORIDE 0.4 MILLIGRAM(S): 0.4 CAPSULE ORAL at 22:10

## 2024-01-02 NOTE — ED ADULT TRIAGE NOTE - CHIEF COMPLAINT QUOTE
As per dtr "the HHA called today and said when she walked in the stove was on and she smelled gas. Dtr is concerned because he has been increasingly confused over the last few weeks, and has been falling a lot over the last few weeks"

## 2024-01-02 NOTE — ED PROVIDER NOTE - ATTENDING CONTRIBUTION TO CARE
I have personally performed a history and physical exam on this patient and personally directed the management of the patient. Patient is a 73-year-old male brought in for evaluation of multiple falls as well as increasing confusion over the past several weeks is here with the daughter who corroborates history patient is unreliable at best but denies any headache visual changes chest pain shortness of breath abdominal pain back pain or extremity weakness    On physical exam patient is normocephalic he has superficial abrasions in the occipital region that appear old superficial abrasion over the right eyebrow no Danielson sign no raccoon eyes no hemotympanum oropharynx clear chest clear to auscultation bilaterally upon inspection of the chest wall patient has bruising over the posterior and lateral aspect of the chest wall approximately T8-T12 no other bruising noted no pain with palpation CT or L-spine midline abdomen soft nontender nondistended bowel sounds positive no guarding or rebound extremities pedal pulse 2+ no edema patient is able to move all 4 extremities without pain    Assessment plan patient presents for evaluation of increased confusion and multiple falls not on any oral anticoagulation per the daughter we obtain CT head C-spine chest abdomen pelvis EKG per my independent evaluation is not consistent with STEMI not consistent with QT prolongation not consistent with arrhythmia.  Patient chest x-ray per my) evaluation not consistent with pneumonia or pneumothorax given this patient's history of increasing confusion and increasing falls I will admit for further evaluation

## 2024-01-02 NOTE — ED ADULT NURSE NOTE - NSFALLHARMRISKINTERV_ED_ALL_ED
Communicate risk of Fall with Harm to all staff, patient, and family/Provide visual cue: red socks, yellow wristband, yellow gown, etc/Reinforce activity limits and safety measures with patient and family/Bed in lowest position, wheels locked, appropriate side rails in place/Call bell, personal items and telephone in reach/Instruct patient to call for assistance before getting out of bed/chair/stretcher/Non-slip footwear applied when patient is off stretcher/[place] to call system/Physically safe environment - no spills, clutter or unnecessary equipment/Purposeful Proactive Rounding/Room/bathroom lighting operational, light cord in reach Communicate risk of Fall with Harm to all staff, patient, and family/Provide visual cue: red socks, yellow wristband, yellow gown, etc/Reinforce activity limits and safety measures with patient and family/Bed in lowest position, wheels locked, appropriate side rails in place/Call bell, personal items and telephone in reach/Instruct patient to call for assistance before getting out of bed/chair/stretcher/Non-slip footwear applied when patient is off stretcher/[place] to call system/Physically safe environment - no spills, clutter or unnecessary equipment/Purposeful Proactive Rounding/Room/bathroom lighting operational, light cord in reach

## 2024-01-02 NOTE — H&P ADULT - NSHPPHYSICALEXAM_GEN_ALL_CORE
SKIN: warm, dry, Multiple areas of bruising in different stages of healing.  HEAD: Normocephalic; old superficial abrasions   EYES: PERRL, EOMI, normal sclera and conjunctiva   ENT: No nasal discharge; airway clear.  NECK: Supple; non tender.  CARD:  Regular rate and rhythm.   RESP: NO inc WOB   ABD: soft ntnd, VIRGIE negative.  Bruising to bilateral buttocks.  EXT: Normal ROM.    LYMPH: No acute cervical adenopathy.  NEURO: AAOx2  PSYCH: Cooperative, appropriate. Vital Signs Last 24 Hrs  T(C): 36.4 (02 Jan 2024 15:12), Max: 36.4 (02 Jan 2024 15:12)  T(F): 97.6 (02 Jan 2024 15:12), Max: 97.6 (02 Jan 2024 15:12)  HR: 71 (02 Jan 2024 15:12) (71 - 71)  BP: 142/58 (02 Jan 2024 15:12) (142/58 - 142/58)  BP(mean): --  RR: 18 (02 Jan 2024 15:12) (18 - 18)  SpO2: 100% (02 Jan 2024 15:12) (100% - 100%)    Parameters below as of 02 Jan 2024 15:12  Patient On (Oxygen Delivery Method): room air          SKIN: warm, dry, Multiple areas of bruising in different stages of healing.  HEAD: Normocephalic; old superficial abrasions   EYES: PERRL, EOMI, normal sclera and conjunctiva   ENT: No nasal discharge; airway clear.  NECK: Supple; non tender.  CARD:  Regular rate and rhythm.   RESP: NO inc WOB   ABD: soft ntnd, VIRGIE negative.  Bruising to bilateral buttocks.  EXT: Normal ROM.    LYMPH: No acute cervical adenopathy.  NEURO: AAOx2 moving all extremities  PSYCH: Cooperative, appropriate.

## 2024-01-02 NOTE — ED PROVIDER NOTE - OBJECTIVE STATEMENT
73-year-old male past medical history of hypertension diabetes BPH coming here for multiple falls weakness inability to care for self.  Patient was with daughter today and was found to have left [facility] for multiple hours without realizing it.  Patient ANO x 1 currently.  In the past has refused further's social work/help/aids but daughter at bedside requesting admission for further eval's.  Daughter is healthcare proxy.

## 2024-01-02 NOTE — ED ADULT NURSE NOTE - SUICIDE SCREENING QUESTION 1
Quality 111:Pneumonia Vaccination Status For Older Adults: Pneumococcal Vaccination Previously Received Quality 431: Preventive Care And Screening: Unhealthy Alcohol Use - Screening: Patient screened for unhealthy alcohol use using a single question and scores less than 2 times per year Quality 130: Documentation Of Current Medications In The Medical Record: Current Medications Documented Quality 226: Preventive Care And Screening: Tobacco Use: Screening And Cessation Intervention: Patient screened for tobacco use and is an ex/non-smoker Detail Level: Detailed Quality 110: Preventive Care And Screening: Influenza Immunization: Influenza Immunization previously received during influenza season No

## 2024-01-02 NOTE — H&P ADULT - NSHPLABSRESULTS_GEN_ALL_CORE
7.6    15.82 )-----------( 296      ( 02 Jan 2024 17:00 )             22.1   Urinalysis Basic - ( 02 Jan 2024 17:00 )    Color: x / Appearance: x / SG: x / pH: x  Gluc: 108 mg/dL / Ketone: x  / Bili: x / Urobili: x   Blood: x / Protein: x / Nitrite: x   Leuk Esterase: x / RBC: x / WBC x   Sq Epi: x / Non Sq Epi: x / Bacteria: x    01-02    136  |  102  |  64<HH>  ----------------------------<  108<H>  5.3<H>   |  21  |  2.6<H>    Ca    8.8      02 Jan 2024 17:00    TPro  6.0  /  Alb  3.8  /  TBili  2.3<H>  /  DBili  x   /  AST  85<H>  /  ALT  39  /  AlkPhos  102  01-02    < from: CT Abdomen and Pelvis w/ IV Cont (01.02.24 @ 17:37) >      IMPRESSION:    No evidence of thoracic, abdominal or pelvic mass or inflammatory process.    No evidence of thoracic, abdominal or pelvic visceral injury, laceration,   or hematoma.    Compression fracture of the superior endplate of L1 of indeterminate age.    A right-sided hydrocele is noted in the right inguinal canal.    --- End of Report ---    < end of copied text >    < from: CT Head No Cont (01.02.24 @ 17:29) >    MPRESSION:    HEAD CT:    No evidence of acute intracranial hemorrhage or acute territorial infarct.    Stable bilateral basal ganglia, and cortical and cerebellar calcifications      C-SPINE CT:    Degenerative changes as described above.    No evidence of fracture or facet subluxation.    --- End of Report ---    < end of copied text >

## 2024-01-02 NOTE — H&P ADULT - HISTORY OF PRESENT ILLNESS
pt is a 72yo M PMHx  of HTN, DL, BPH, depression presents to ER with daughter c/o confusion and frequent falls. As per daughter earlier today pt had turned the gas on the stove but was not cooking anything, the house smelled of gas and pt did not notice. Pt is poor historian AAO x 2, unable to give detailed medical history. Daughter notes pt has has multiple falls recently, no LOC, head trauma. Pt had syncopal event in November this year. Denies fever, chills, cp, sob, n/v/d, abdominal pain, dysuria.

## 2024-01-02 NOTE — ED PROVIDER NOTE - PHYSICAL EXAMINATION
CONSTITUTIONAL:  in no acute distress.   SKIN: warm, dry, Multiple areas of bruising in different stages of healing.  HEAD: Normocephalic; atraumatic.  EYES: PERRL, EOMI, normal sclera and conjunctiva   ENT: No nasal discharge; airway clear.  NECK: Supple; non tender.  CARD:  Regular rate and rhythm.   RESP: NO inc WOB   ABD: soft ntnd, VIRGIE negative.  Bruising to bilateral buttocks.  EXT: Normal ROM.    LYMPH: No acute cervical adenopathy.  NEURO: Alert, oriented, grossly unremarkable  PSYCH: Cooperative, appropriate.

## 2024-01-02 NOTE — H&P ADULT - ASSESSMENT
pt is a 74yo M PMHx  of HTN, DL, BPH, depression presents to ER with daughter c/o confusion and frequent falls.    #NACHO   - admit to medicine   - renal US   -  IVF  - trend Cr   - renal consult   - hold losartan     # Anemia   - 1 u pRBCs   - repeat CBC   - f/u  iron studies    - no signs of bleedings, f/u FOB    # H/O HTN  #H/O DL  - hold losartan   - c/w home statin    # H/O BPH   c/w tamsulosin    # H/O depressive psychosis   - c/w Paxil and Zyprexa      - DVT PPx: Scds   - Diet:  DASH  - Activity: IAT  - Code: Full code pt is a 72yo M PMHx  of HTN, DL, BPH, depression presents to ER with daughter c/o confusion and frequent falls.    #Confusion  # frequent falls   - f/u UA, infectious work up (negative)  - CT head negative for acute pathology   - neurology consult   - pt/ot/rehab     #NACHO   - admit to medicine   - renal US   -  IVF  - trend Cr   - renal consult   - hold losartan     # Anemia   - 1 u pRBCs   - repeat CBC   - f/u  iron studies    - no signs of bleedings, f/u FOB    # H/O HTN  #H/O DL  - hold losartan   - c/w home statin    # H/O BPH   c/w tamsulosin    # H/O depressive psychosis   - c/w Paxil and Zyprexa      - DVT PPx: Scds   - Diet:  DASH  - Activity: IAT  - Code: Full code pt is a 74yo M PMHx  of HTN, DL, BPH, depression presents to ER with daughter c/o confusion and frequent falls.    #Confusion  # frequent falls   - f/u UA, infectious work up (negative)  - CT head negative for acute pathology   - pt/ot/rehab   - follows neurologist Dr Castillo      #NACHO   - admit to medicine   - renal US   -  IVF  - trend Cr   - renal consult   - hold losartan     # Anemia   - 1 u pRBCs   - repeat CBC   - f/u  iron studies    - no signs of bleedings, f/u FOB    # H/O HTN  #H/O DL  - hold losartan   - c/w home statin    # H/O BPH   c/w tamsulosin    # H/O depressive psychosis   - c/w Paxil and Zyprexa      - DVT PPx: Scds   - Diet:  DASH  - Activity: IAT  - Code: Full code pt is a 72yo M PMHx  of HTN, DL, BPH, depression presents to ER with daughter c/o confusion and frequent falls, found to have NACHO and anemia.     #Confusion  # frequent falls   - f/u UA, infectious work up (negative)  - CT head negative for acute pathology   - pt/ot/rehab   - pt with worsening confusion as per daughter, follows Dr Castillo as out patient (recommended decreasing v tapering off zyprexa ( Psych Dr decreased dose, but does not agree it is causing symptoms)   - neuro consult     #NACHO   - admit to medicine   - renal US   -  IVF  - trend Cr   - renal consult   - hold losartan     # Anemia   - 1 u pRBCs   - repeat CBC   - f/u  iron studies    - no signs of bleedings, f/u FOB    # H/O HTN  #H/O DL  - hold losartan   - c/w home statin    # H/O BPH   c/w tamsulosin    # H/O depressive psychosis   - c/w Paxil and Zyprexa  - pt follows Dr RESENDEZ ( via televisit but pt has had trouble with sessions and recommended in person eval.   - IPP consult for worsening symptoms v dementia     - DVT PPx: Scds   - Diet:  DASH  - Activity: IAT  - Code: Full code pt is a 72yo M PMHx  of HTN, DL, BPH, depression presents to ER with daughter c/o confusion and frequent falls, found to have NACHO and anemia.     #Confusion  # frequent falls   - f/u UA, infectious work up (negative)  - CT head negative for acute pathology   - pt/ot/rehab   - pt with worsening confusion as per daughter, follows Dr Castillo as out patient (recommended decreasing v tapering off zyprexa ( Psych Dr decreased dose, but does not agree it is causing symptoms)   - neuro consult     #NACHO   - admit to medicine   - renal US   -  IVF  - trend Cr   - renal consult   - hold losartan     # Anemia   - 1 u pRBCs   - repeat CBC   - f/u  iron studies  , b12, and folate  - no signs of bleedings, f/u FOB  -GI consult  -PPI    # H/O HTN  #H/O DL  - hold losartan   - c/w home statin    # H/O BPH   c/w tamsulosin    # H/O depressive psychosis   - c/w Paxil and Zyprexa  - pt follows Dr RESENDEZ ( via televisit but pt has had trouble with sessions and recommended in person eval.   - IPP consult for worsening symptoms v dementia     - DVT PPx: Scds   - Diet:  DASH  - Activity: IAT  - Code: Full code pt is a 74yo M PMHx  of HTN, DL, BPH, depression presents to ER with daughter c/o confusion and frequent falls, found to have NACHO and anemia.     #Confusion  # frequent falls   - f/u UA, infectious work up (negative)  - CT head negative for acute pathology   - pt/ot/rehab   - pt with worsening confusion as per daughter, follows Dr Castillo as out patient (recommended decreasing v tapering off zyprexa ( Psych Dr decreased dose, but does not agree it is causing symptoms)   - neuro consult     #NACHO   - admit to medicine   - renal US   -  IVF  - trend Cr   - renal consult   - hold losartan     # Anemia   - 1 u pRBCs   - repeat CBC   - f/u  iron studies  , b12, and folate  - no signs of bleedings, f/u FOB  -GI consult  -PPI    # H/O HTN  #H/O DL  - hold losartan   - c/w home statin    # H/O BPH   c/w tamsulosin    # H/O depressive psychosis   - c/w Paxil and Zyprexa  - pt follows Dr RESENDEZ ( via televisit but pt has had trouble with sessions and recommended in person eval.   - IPP consult for worsening symptoms v dementia     - DVT PPx: Scds   - Diet:  DASH  - Activity: IAT  - Code: Full code

## 2024-01-02 NOTE — ED PROVIDER NOTE - CARE PLAN
Principal Discharge DX:	Fall   1 Principal Discharge DX:	Fall  Secondary Diagnosis:	NACHO (acute kidney injury)  Secondary Diagnosis:	Anemia

## 2024-01-03 ENCOUNTER — APPOINTMENT (OUTPATIENT)
Dept: PSYCHIATRY | Facility: CLINIC | Age: 74
End: 2024-01-03

## 2024-01-03 LAB
ANION GAP SERPL CALC-SCNC: 15 MMOL/L — HIGH (ref 7–14)
ANION GAP SERPL CALC-SCNC: 15 MMOL/L — HIGH (ref 7–14)
BUN SERPL-MCNC: 58 MG/DL — HIGH (ref 10–20)
BUN SERPL-MCNC: 58 MG/DL — HIGH (ref 10–20)
CALCIUM SERPL-MCNC: 7.9 MG/DL — LOW (ref 8.4–10.5)
CALCIUM SERPL-MCNC: 7.9 MG/DL — LOW (ref 8.4–10.5)
CHLORIDE SERPL-SCNC: 105 MMOL/L — SIGNIFICANT CHANGE UP (ref 98–110)
CHLORIDE SERPL-SCNC: 105 MMOL/L — SIGNIFICANT CHANGE UP (ref 98–110)
CK SERPL-CCNC: 1401 U/L — HIGH (ref 0–225)
CK SERPL-CCNC: 1401 U/L — HIGH (ref 0–225)
CO2 SERPL-SCNC: 19 MMOL/L — SIGNIFICANT CHANGE UP (ref 17–32)
CO2 SERPL-SCNC: 19 MMOL/L — SIGNIFICANT CHANGE UP (ref 17–32)
CREAT SERPL-MCNC: 1.9 MG/DL — HIGH (ref 0.7–1.5)
CREAT SERPL-MCNC: 1.9 MG/DL — HIGH (ref 0.7–1.5)
EGFR: 36 ML/MIN/1.73M2 — LOW
EGFR: 36 ML/MIN/1.73M2 — LOW
GLUCOSE SERPL-MCNC: 87 MG/DL — SIGNIFICANT CHANGE UP (ref 70–99)
GLUCOSE SERPL-MCNC: 87 MG/DL — SIGNIFICANT CHANGE UP (ref 70–99)
HCT VFR BLD CALC: 25.9 % — LOW (ref 42–52)
HCT VFR BLD CALC: 25.9 % — LOW (ref 42–52)
HCV AB S/CO SERPL IA: 0.04 COI — SIGNIFICANT CHANGE UP
HCV AB S/CO SERPL IA: 0.04 COI — SIGNIFICANT CHANGE UP
HCV AB SERPL-IMP: SIGNIFICANT CHANGE UP
HCV AB SERPL-IMP: SIGNIFICANT CHANGE UP
HGB BLD-MCNC: 8.5 G/DL — LOW (ref 14–18)
HGB BLD-MCNC: 8.5 G/DL — LOW (ref 14–18)
IRON SATN MFR SERPL: 22 % — SIGNIFICANT CHANGE UP (ref 15–50)
IRON SATN MFR SERPL: 22 % — SIGNIFICANT CHANGE UP (ref 15–50)
IRON SATN MFR SERPL: 51 UG/DL — SIGNIFICANT CHANGE UP (ref 35–150)
IRON SATN MFR SERPL: 51 UG/DL — SIGNIFICANT CHANGE UP (ref 35–150)
MCHC RBC-ENTMCNC: 30.4 PG — SIGNIFICANT CHANGE UP (ref 27–31)
MCHC RBC-ENTMCNC: 30.4 PG — SIGNIFICANT CHANGE UP (ref 27–31)
MCHC RBC-ENTMCNC: 32.8 G/DL — SIGNIFICANT CHANGE UP (ref 32–37)
MCHC RBC-ENTMCNC: 32.8 G/DL — SIGNIFICANT CHANGE UP (ref 32–37)
MCV RBC AUTO: 92.5 FL — SIGNIFICANT CHANGE UP (ref 80–94)
MCV RBC AUTO: 92.5 FL — SIGNIFICANT CHANGE UP (ref 80–94)
NRBC # BLD: 0 /100 WBCS — SIGNIFICANT CHANGE UP (ref 0–0)
NRBC # BLD: 0 /100 WBCS — SIGNIFICANT CHANGE UP (ref 0–0)
PLATELET # BLD AUTO: 236 K/UL — SIGNIFICANT CHANGE UP (ref 130–400)
PLATELET # BLD AUTO: 236 K/UL — SIGNIFICANT CHANGE UP (ref 130–400)
PMV BLD: 9.6 FL — SIGNIFICANT CHANGE UP (ref 7.4–10.4)
PMV BLD: 9.6 FL — SIGNIFICANT CHANGE UP (ref 7.4–10.4)
POTASSIUM SERPL-MCNC: 4.4 MMOL/L — SIGNIFICANT CHANGE UP (ref 3.5–5)
POTASSIUM SERPL-MCNC: 4.4 MMOL/L — SIGNIFICANT CHANGE UP (ref 3.5–5)
POTASSIUM SERPL-SCNC: 4.4 MMOL/L — SIGNIFICANT CHANGE UP (ref 3.5–5)
POTASSIUM SERPL-SCNC: 4.4 MMOL/L — SIGNIFICANT CHANGE UP (ref 3.5–5)
RBC # BLD: 2.8 M/UL — LOW (ref 4.7–6.1)
RBC # BLD: 2.8 M/UL — LOW (ref 4.7–6.1)
RBC # FLD: 14.1 % — SIGNIFICANT CHANGE UP (ref 11.5–14.5)
RBC # FLD: 14.1 % — SIGNIFICANT CHANGE UP (ref 11.5–14.5)
SODIUM SERPL-SCNC: 139 MMOL/L — SIGNIFICANT CHANGE UP (ref 135–146)
SODIUM SERPL-SCNC: 139 MMOL/L — SIGNIFICANT CHANGE UP (ref 135–146)
TIBC SERPL-MCNC: 229 UG/DL — SIGNIFICANT CHANGE UP (ref 220–430)
TIBC SERPL-MCNC: 229 UG/DL — SIGNIFICANT CHANGE UP (ref 220–430)
TRANSFERRIN SERPL-MCNC: 184 MG/DL — LOW (ref 200–360)
TRANSFERRIN SERPL-MCNC: 184 MG/DL — LOW (ref 200–360)
TSH SERPL-MCNC: 3.63 UIU/ML — SIGNIFICANT CHANGE UP (ref 0.27–4.2)
TSH SERPL-MCNC: 3.63 UIU/ML — SIGNIFICANT CHANGE UP (ref 0.27–4.2)
UIBC SERPL-MCNC: 178 UG/DL — SIGNIFICANT CHANGE UP (ref 110–370)
UIBC SERPL-MCNC: 178 UG/DL — SIGNIFICANT CHANGE UP (ref 110–370)
WBC # BLD: 11.08 K/UL — HIGH (ref 4.8–10.8)
WBC # BLD: 11.08 K/UL — HIGH (ref 4.8–10.8)
WBC # FLD AUTO: 11.08 K/UL — HIGH (ref 4.8–10.8)
WBC # FLD AUTO: 11.08 K/UL — HIGH (ref 4.8–10.8)

## 2024-01-03 PROCEDURE — 99222 1ST HOSP IP/OBS MODERATE 55: CPT

## 2024-01-03 PROCEDURE — 99223 1ST HOSP IP/OBS HIGH 75: CPT

## 2024-01-03 PROCEDURE — 99232 SBSQ HOSP IP/OBS MODERATE 35: CPT

## 2024-01-03 RX ORDER — SODIUM CHLORIDE 9 MG/ML
1000 INJECTION, SOLUTION INTRAVENOUS
Refills: 0 | Status: DISCONTINUED | OUTPATIENT
Start: 2024-01-03 | End: 2024-01-06

## 2024-01-03 RX ORDER — AMLODIPINE BESYLATE 2.5 MG/1
2.5 TABLET ORAL DAILY
Refills: 0 | Status: DISCONTINUED | OUTPATIENT
Start: 2024-01-03 | End: 2024-01-08

## 2024-01-03 RX ADMIN — TAMSULOSIN HYDROCHLORIDE 0.4 MILLIGRAM(S): 0.4 CAPSULE ORAL at 21:51

## 2024-01-03 RX ADMIN — AMLODIPINE BESYLATE 2.5 MILLIGRAM(S): 2.5 TABLET ORAL at 05:52

## 2024-01-03 RX ADMIN — PANTOPRAZOLE SODIUM 40 MILLIGRAM(S): 20 TABLET, DELAYED RELEASE ORAL at 05:52

## 2024-01-03 RX ADMIN — Medication 325 MILLIGRAM(S): at 13:13

## 2024-01-03 RX ADMIN — OLANZAPINE 2.5 MILLIGRAM(S): 15 TABLET, FILM COATED ORAL at 21:51

## 2024-01-03 RX ADMIN — Medication 40 MILLIGRAM(S): at 16:56

## 2024-01-03 RX ADMIN — SODIUM CHLORIDE 75 MILLILITER(S): 9 INJECTION, SOLUTION INTRAVENOUS at 16:56

## 2024-01-03 RX ADMIN — ATORVASTATIN CALCIUM 10 MILLIGRAM(S): 80 TABLET, FILM COATED ORAL at 21:51

## 2024-01-03 NOTE — CONSULT NOTE ADULT - SUBJECTIVE AND OBJECTIVE BOX
Neurology Consult  RAFAEL Rush 1875    Patient is a 73y old  Male who presents with a chief complaint of confusion and falls (03 Jan 2024 10:25)      HPI:  pt is a 72yo M PMHx  of HTN, DL, BPH, depression presents to ER with daughter c/o confusion and frequent falls. As per daughter earlier today pt had turned the gas on the stove but was not cooking anything, the house smelled of gas and pt did not notice. Pt is poor historian AAO x 2, unable to give detailed medical history. Daughter notes pt has has multiple falls recently, no LOC, head trauma. Pt had syncopal event in November this year. Denies fever, chills, cp, sob, n/v/d, abdominal pain, dysuria.        (02 Jan 2024 20:26)    Interim HPI -  Pt seen at bedside with Dr. Khan, patient is awake and oriented as above. Pt states he admits to having falls, lives alone, and has aches and pains in multiple areas. Patient states no headache no dizziness no visual complaints. Pt admits to chronic left sided facial assymetry.    PAST MEDICAL & SURGICAL HISTORY:  Hypertension      BPH (benign prostatic hyperplasia)      Skin cancer      Anxiety      Depression      Hypercholesteremia      Status post Mohs surgery      Inguinal hernia  left 2 years ago          FAMILY HISTORY:      Allergies    No Known Allergies    Intolerances        MEDICATIONS  (STANDING):  amLODIPine   Tablet 2.5 milliGRAM(s) Oral daily  atorvastatin 10 milliGRAM(s) Oral at bedtime  ferrous    sulfate 325 milliGRAM(s) Oral daily  OLANZapine 2.5 milliGRAM(s) Oral at bedtime  pantoprazole    Tablet 40 milliGRAM(s) Oral before breakfast  PARoxetine 40 milliGRAM(s) Oral daily  sodium chloride 0.9%. 1000 milliLiter(s) (75 mL/Hr) IV Continuous <Continuous>  tamsulosin 0.4 milliGRAM(s) Oral at bedtime    MEDICATIONS  (PRN):  acetaminophen     Tablet .. 650 milliGRAM(s) Oral every 6 hours PRN Temp greater or equal to 38C (100.4F), Mild Pain (1 - 3)      Review of systems:    Constitutional: No fever, weight loss or fatigue    Eyes: No eye pain or discharge  ENMT:  No difficulty hearing; No sinus or throat pain  Neck: +chronic pain  Respiratory: No cough, wheezing, chills or hemoptysis  Cardiovascular: No chest pain, palpitations, shortness of breath, dyspnea on exertion  Gastrointestinal: No abdominal pain, nausea, vomiting or hematemesis; No diarrhea or constipation.   Genitourinary: No dysuria, frequency, hematuria or incontinence  Neurological: As per HPI  Skin: + multiple bruising  Endocrine: No heat or cold intolerance; No hair loss  Musculoskeletal: + chronic pain due to falls.  Psychiatric: No depression, anxiety, mood swings  Heme/Lymph: No easy bruising or bleeding gums    Vital Signs Last 24 Hrs  T(C): 36.2 (03 Jan 2024 04:29), Max: 36.4 (02 Jan 2024 15:12)  T(F): 97.2 (03 Jan 2024 04:29), Max: 97.6 (02 Jan 2024 15:12)  HR: 62 (03 Jan 2024 04:29) (62 - 71)  BP: 182/72 (03 Jan 2024 04:29) (132/51 - 182/72)  BP(mean): --  RR: 18 (03 Jan 2024 04:29) (18 - 18)  SpO2: 97% (03 Jan 2024 04:29) (97% - 100%)    Parameters below as of 03 Jan 2024 04:29  Patient On (Oxygen Delivery Method): room air        Neurologic Examination:  General:  Appearance is consistent with chronologic age.  No abnormal facies.   General: The patient is oriented to person, and place, .  Recent and remote memory intact.  Fund of knowledge is intact and normal.  Language with normal repetition, comprehension and naming.  Nondysarthric.    Cranial nerves: Glasses present,  VFF.  EOMI w/o nystagmus, skew or reported double vision.  PERRL.  No ptosis/weakness of eyelid closure.  Facial sensation is normal with normal bite.  + chronic Left facial droop.  Hearing grossly intact b/l.  Palate elevates midline.  Tongue midline. Poor dentition.  Motor examination:   Normal tone, bulk and range of motion.  No tenderness, twitching, tremors or involuntary movements.  Formal Muscle Strength Testing: (MRC grade R/L) 5/5 UE; 5/5 LE.  No observable drift.  Reflexes:   2+ b/l, triceps, brachioradialis, patella and Achilles.  Plantar response downgoing b/l.  Jaw jerk, Silviano, clonus absent.  Sensory examination:   Intact to light touch  and proprioception in all extremities.  Cerebellum:   FTN/HKS intact with normal ELFEGO in all limbs.  No dysmetria or dysdiadokinesia.      Labs:   CBC Full  -  ( 03 Jan 2024 04:30 )  WBC Count : 11.08 K/uL  RBC Count : 2.80 M/uL  Hemoglobin : 8.5 g/dL  Hematocrit : 25.9 %  Platelet Count - Automated : 236 K/uL  Mean Cell Volume : 92.5 fL  Mean Cell Hemoglobin : 30.4 pg  Mean Cell Hemoglobin Concentration : 32.8 g/dL      01-03    139  |  105  |  58<H>  ----------------------------<  87  4.4   |  19  |  1.9<H>    Ca    7.9<L>      03 Jan 2024 04:30    TPro  6.0  /  Alb  3.8  /  TBili  2.3<H>  /  DBili  x   /  AST  85<H>  /  ALT  39  /  AlkPhos  102  01-02    LIVER FUNCTIONS - ( 02 Jan 2024 17:00 )  Alb: 3.8 g/dL / Pro: 6.0 g/dL / ALK PHOS: 102 U/L / ALT: 39 U/L / AST: 85 U/L / GGT: x           PT/INR - ( 02 Jan 2024 17:00 )   PT: 11.70 sec;   INR: 1.03 ratio         PTT - ( 02 Jan 2024 17:00 )  PTT:26.8 sec  Urinalysis Basic - ( 03 Jan 2024 04:30 )    Color: x / Appearance: x / SG: x / pH: x  Gluc: 87 mg/dL / Ketone: x  / Bili: x / Urobili: x   Blood: x / Protein: x / Nitrite: x   Leuk Esterase: x / RBC: x / WBC x   Sq Epi: x / Non Sq Epi: x / Bacteria: x          Neuroimaging:  NCHCT: CT Head No Cont:   ACC: [id number] EXAM:  CT CERVICAL SPINE   ORDERED BY: DAVID FERRER     ACC: [id number] EXAM:  CT BRAIN   ORDERED BY: DAVID FERRER     PROCEDURE DATE:  01/02/2024          INTERPRETATION:  CLINICAL HISTORY / REASON FOR EXAM: Trauma. Multiple   falls. AMS. confusion.    TECHNIQUE: HEAD CT WITHOUT IV CONTRAST.. Contiguous unenhanced CT axial   images of the head from the base to the vertex with coronal and sagittal   reformats.    TECHNIQUE:  CT CERVICAL SPINE WITHOUT IV CONTRAST. Contiguous unenhanced   CT axial images of the cervical spine with coronal and sagittal   re-formations.    Comparison: CT head dated 11/6/2023; CT Neck dated 4/3/2023      FINDINGS - HEAD CT:    Stable bilateral basal ganglia, and cortical and cerebellar calcifications    There is prominence of the ventricles, cortical sulci, and basal cisterns   consistent with volume loss appropriate for the patient's age.    Grey-white matter differentiation is preserved.    Mild carotid siphon calcifications are noted.    There are patchy hypodensities throughout the hemispheric white matter   without mass effect compatible with chronic microvascular changes.    The fourth ventricle is midline.    There is no acute territorial infarct, intracranial hemorrhage,   extra-axial fluid collection or midline shift.    Calvarium is intact. No evidence of depressed skull fracture.    The visualized paranasal sinuses and mastoids are well-aerated.        FINDINGS ? C-SPINE CT:    The visualized pre-vertebral and paraspinal soft tissues are unremarkable.    There is preservation of the normal curvature of the C-spine.    There is no evidence of acute fracture, compression deformity or facet   subluxation.    The odontoid and atlantoaxial relationships are intact. Degenerative   ligamentous calcification is noted in the region of the atlantoaxial   articulation.    Degenerative changes with disc space narrowing and large anterior   osteophyte formation at C4-5, C5-6, and C6-7. There is encroachment upon   the spinal canal by osteophytes at C5-6 and C6-7. (Please note spinal   canal contents are suboptimally evaluated inherent to CT technique.)    Multilevel mild facet joint and uncovertebral joint hypertrophy is noted.    There is encroachment upon the neural foramina on the left side at C2-3   and C3-4, and bilaterally at C6-7.    There is mild anterolisthesis at C7-T1.    Apical pleural calcifications are noted. Bilateral carotid calcifications   are noted.    IMPRESSION:    HEAD CT:    No evidence of acute intracranial hemorrhage or acute territorial infarct.    Stable bilateral basal ganglia, and cortical and cerebellar calcifications      C-SPINE CT:    Degenerative changes as described above.    No evidence of fracture or facet subluxation.    --- End of Report ---      SOO PEPE MD; Attending Interventional Radiologist  This document has been electronically signed. Jan 2 2024  6:26PM (01-02-24 @ 17:29)      Assessment:  This is a 73y Male with h/o HTN, DL, BPH, depression presents to ER with daughter c/o confusion and frequent falls.  Multiple Falls & Confusion likely Metabolic Encephalopathy secondary to infectious source vs Anemia vs NACHO    CTH: No evidence of acute intracranial hemorrhage or acute territorial infarct.  Stable bilateral basal ganglia, and cortical and cerebellar calcifications    Plan:   - CTH noted above  - Obtain B12, Ammonia level, and TSH  - Fall precautions  - Medicine management of leukocytosis and elevated Anion Gap  - Nephrology management of NACHO  - GI and Medicine management of Anemia s/p transfusion  - Zyprexa management as per Psych  - Pt follows Dr Castillo Neurology outpatient.  - Follow Ck levels   - Contact neuro team for any change in condition.       01-03-24 @ 12:06       Neurology Consult  RAFAEL Rush 7836    Patient is a 73y old  Male who presents with a chief complaint of confusion and falls (03 Jan 2024 10:25)      HPI:  pt is a 74yo M PMHx  of HTN, DL, BPH, depression presents to ER with daughter c/o confusion and frequent falls. As per daughter earlier today pt had turned the gas on the stove but was not cooking anything, the house smelled of gas and pt did not notice. Pt is poor historian AAO x 2, unable to give detailed medical history. Daughter notes pt has has multiple falls recently, no LOC, head trauma. Pt had syncopal event in November this year. Denies fever, chills, cp, sob, n/v/d, abdominal pain, dysuria.        (02 Jan 2024 20:26)    Interim HPI -  Pt seen at bedside with Dr. Khan, patient is awake and oriented as above. Pt states he admits to having falls, lives alone, and has aches and pains in multiple areas. Patient states no headache no dizziness no visual complaints. Pt admits to chronic left sided facial assymetry.    PAST MEDICAL & SURGICAL HISTORY:  Hypertension      BPH (benign prostatic hyperplasia)      Skin cancer      Anxiety      Depression      Hypercholesteremia      Status post Mohs surgery      Inguinal hernia  left 2 years ago          FAMILY HISTORY:      Allergies    No Known Allergies    Intolerances        MEDICATIONS  (STANDING):  amLODIPine   Tablet 2.5 milliGRAM(s) Oral daily  atorvastatin 10 milliGRAM(s) Oral at bedtime  ferrous    sulfate 325 milliGRAM(s) Oral daily  OLANZapine 2.5 milliGRAM(s) Oral at bedtime  pantoprazole    Tablet 40 milliGRAM(s) Oral before breakfast  PARoxetine 40 milliGRAM(s) Oral daily  sodium chloride 0.9%. 1000 milliLiter(s) (75 mL/Hr) IV Continuous <Continuous>  tamsulosin 0.4 milliGRAM(s) Oral at bedtime    MEDICATIONS  (PRN):  acetaminophen     Tablet .. 650 milliGRAM(s) Oral every 6 hours PRN Temp greater or equal to 38C (100.4F), Mild Pain (1 - 3)      Review of systems:    Constitutional: No fever, weight loss or fatigue    Eyes: No eye pain or discharge  ENMT:  No difficulty hearing; No sinus or throat pain  Neck: +chronic pain  Respiratory: No cough, wheezing, chills or hemoptysis  Cardiovascular: No chest pain, palpitations, shortness of breath, dyspnea on exertion  Gastrointestinal: No abdominal pain, nausea, vomiting or hematemesis; No diarrhea or constipation.   Genitourinary: No dysuria, frequency, hematuria or incontinence  Neurological: As per HPI  Skin: + multiple bruising  Endocrine: No heat or cold intolerance; No hair loss  Musculoskeletal: + chronic pain due to falls.  Psychiatric: No depression, anxiety, mood swings  Heme/Lymph: No easy bruising or bleeding gums    Vital Signs Last 24 Hrs  T(C): 36.2 (03 Jan 2024 04:29), Max: 36.4 (02 Jan 2024 15:12)  T(F): 97.2 (03 Jan 2024 04:29), Max: 97.6 (02 Jan 2024 15:12)  HR: 62 (03 Jan 2024 04:29) (62 - 71)  BP: 182/72 (03 Jan 2024 04:29) (132/51 - 182/72)  BP(mean): --  RR: 18 (03 Jan 2024 04:29) (18 - 18)  SpO2: 97% (03 Jan 2024 04:29) (97% - 100%)    Parameters below as of 03 Jan 2024 04:29  Patient On (Oxygen Delivery Method): room air        Neurologic Examination:  General:  Appearance is consistent with chronologic age.  No abnormal facies.   General: The patient is oriented to person, and place, .  Recent and remote memory intact.  Fund of knowledge is intact and normal.  Language with normal repetition, comprehension and naming.  Nondysarthric.    Cranial nerves: Glasses present,  VFF.  EOMI w/o nystagmus, skew or reported double vision.  PERRL.  No ptosis/weakness of eyelid closure.  Facial sensation is normal with normal bite.  + chronic Left facial droop.  Hearing grossly intact b/l.  Palate elevates midline.  Tongue midline. Poor dentition.  Motor examination:   Normal tone, bulk and range of motion.  No tenderness, twitching, tremors or involuntary movements.  Formal Muscle Strength Testing: (MRC grade R/L) 5/5 UE; 5/5 LE.  No observable drift.  Reflexes:   2+ b/l, triceps, brachioradialis, patella and Achilles.  Plantar response downgoing b/l.  Jaw jerk, Silviano, clonus absent.  Sensory examination:   Intact to light touch  and proprioception in all extremities.  Cerebellum:   FTN/HKS intact with normal ELFEGO in all limbs.  No dysmetria or dysdiadokinesia.      Labs:   CBC Full  -  ( 03 Jan 2024 04:30 )  WBC Count : 11.08 K/uL  RBC Count : 2.80 M/uL  Hemoglobin : 8.5 g/dL  Hematocrit : 25.9 %  Platelet Count - Automated : 236 K/uL  Mean Cell Volume : 92.5 fL  Mean Cell Hemoglobin : 30.4 pg  Mean Cell Hemoglobin Concentration : 32.8 g/dL      01-03    139  |  105  |  58<H>  ----------------------------<  87  4.4   |  19  |  1.9<H>    Ca    7.9<L>      03 Jan 2024 04:30    TPro  6.0  /  Alb  3.8  /  TBili  2.3<H>  /  DBili  x   /  AST  85<H>  /  ALT  39  /  AlkPhos  102  01-02    LIVER FUNCTIONS - ( 02 Jan 2024 17:00 )  Alb: 3.8 g/dL / Pro: 6.0 g/dL / ALK PHOS: 102 U/L / ALT: 39 U/L / AST: 85 U/L / GGT: x           PT/INR - ( 02 Jan 2024 17:00 )   PT: 11.70 sec;   INR: 1.03 ratio         PTT - ( 02 Jan 2024 17:00 )  PTT:26.8 sec  Urinalysis Basic - ( 03 Jan 2024 04:30 )    Color: x / Appearance: x / SG: x / pH: x  Gluc: 87 mg/dL / Ketone: x  / Bili: x / Urobili: x   Blood: x / Protein: x / Nitrite: x   Leuk Esterase: x / RBC: x / WBC x   Sq Epi: x / Non Sq Epi: x / Bacteria: x          Neuroimaging:  NCHCT: CT Head No Cont:   ACC: [id number] EXAM:  CT CERVICAL SPINE   ORDERED BY: DAVID FERRER     ACC: [id number] EXAM:  CT BRAIN   ORDERED BY: DAVID FERRER     PROCEDURE DATE:  01/02/2024          INTERPRETATION:  CLINICAL HISTORY / REASON FOR EXAM: Trauma. Multiple   falls. AMS. confusion.    TECHNIQUE: HEAD CT WITHOUT IV CONTRAST.. Contiguous unenhanced CT axial   images of the head from the base to the vertex with coronal and sagittal   reformats.    TECHNIQUE:  CT CERVICAL SPINE WITHOUT IV CONTRAST. Contiguous unenhanced   CT axial images of the cervical spine with coronal and sagittal   re-formations.    Comparison: CT head dated 11/6/2023; CT Neck dated 4/3/2023      FINDINGS - HEAD CT:    Stable bilateral basal ganglia, and cortical and cerebellar calcifications    There is prominence of the ventricles, cortical sulci, and basal cisterns   consistent with volume loss appropriate for the patient's age.    Grey-white matter differentiation is preserved.    Mild carotid siphon calcifications are noted.    There are patchy hypodensities throughout the hemispheric white matter   without mass effect compatible with chronic microvascular changes.    The fourth ventricle is midline.    There is no acute territorial infarct, intracranial hemorrhage,   extra-axial fluid collection or midline shift.    Calvarium is intact. No evidence of depressed skull fracture.    The visualized paranasal sinuses and mastoids are well-aerated.        FINDINGS ? C-SPINE CT:    The visualized pre-vertebral and paraspinal soft tissues are unremarkable.    There is preservation of the normal curvature of the C-spine.    There is no evidence of acute fracture, compression deformity or facet   subluxation.    The odontoid and atlantoaxial relationships are intact. Degenerative   ligamentous calcification is noted in the region of the atlantoaxial   articulation.    Degenerative changes with disc space narrowing and large anterior   osteophyte formation at C4-5, C5-6, and C6-7. There is encroachment upon   the spinal canal by osteophytes at C5-6 and C6-7. (Please note spinal   canal contents are suboptimally evaluated inherent to CT technique.)    Multilevel mild facet joint and uncovertebral joint hypertrophy is noted.    There is encroachment upon the neural foramina on the left side at C2-3   and C3-4, and bilaterally at C6-7.    There is mild anterolisthesis at C7-T1.    Apical pleural calcifications are noted. Bilateral carotid calcifications   are noted.    IMPRESSION:    HEAD CT:    No evidence of acute intracranial hemorrhage or acute territorial infarct.    Stable bilateral basal ganglia, and cortical and cerebellar calcifications      C-SPINE CT:    Degenerative changes as described above.    No evidence of fracture or facet subluxation.    --- End of Report ---      SOO PEPE MD; Attending Interventional Radiologist  This document has been electronically signed. Jan 2 2024  6:26PM (01-02-24 @ 17:29)      Assessment:  This is a 73y Male with h/o HTN, DL, BPH, depression presents to ER with daughter c/o confusion and frequent falls.  Multiple Falls & Confusion likely Metabolic Encephalopathy secondary to infectious source vs Anemia vs NACHO    CTH: No evidence of acute intracranial hemorrhage or acute territorial infarct.  Stable bilateral basal ganglia, and cortical and cerebellar calcifications    Plan:   - CTH noted above  - Obtain B12, Ammonia level, and TSH  - Fall precautions  - Medicine management of leukocytosis and elevated Anion Gap  - Nephrology management of NACHO  - GI and Medicine management of Anemia s/p transfusion  - Zyprexa management as per Psych  - Pt follows Dr Castillo Neurology outpatient.  - Follow Ck levels   - Contact neuro team for any change in condition.       01-03-24 @ 12:06       Neurology Consult  RAFAEL Rush 4532    Patient is a 73y old  Male who presents with a chief complaint of confusion and falls (03 Jan 2024 10:25)      HPI:  pt is a 74yo M PMHx  of HTN, DL, BPH, depressive psychosis  presents to ER with daughter c/o confusion and frequent falls. As per daughter earlier today pt had turned the gas on the stove but was not cooking anything, the house smelled of gas and pt did not notice. Pt is poor historian AAO x 2, unable to give detailed medical history. Daughter notes pt has had multiple falls recently, no LOC, head trauma. Pt had syncopal event in November this year with negative neurologic w/u. Denies fever, chills, cp, sob, n/v/d, abdominal pain, dysuria.  Pt seen at bedside with Dr. Khan, patient is awake and oriented as above. Pt states he admits to having falls, lives alone, and has aches and pains in multiple areas. Patient states no headache no dizziness no visual complaints. Pt admits to chronic left sided facial assymetry.  Follows with Dr. Castillo for neurology.      PAST MEDICAL & SURGICAL HISTORY:  Hypertension  BPH (benign prostatic hyperplasia)  Skin cancer  Anxiety  Depression  Hypercholesteremia  Status post Mohs surgery  Inguinal hernia  left 2 years ago    FAMILY HISTORY:    Allergies    No Known Allergies    Intolerances    MEDICATIONS  (STANDING):  amLODIPine   Tablet 2.5 milliGRAM(s) Oral daily  atorvastatin 10 milliGRAM(s) Oral at bedtime  ferrous    sulfate 325 milliGRAM(s) Oral daily  OLANZapine 2.5 milliGRAM(s) Oral at bedtime  pantoprazole    Tablet 40 milliGRAM(s) Oral before breakfast  PARoxetine 40 milliGRAM(s) Oral daily  sodium chloride 0.9%. 1000 milliLiter(s) (75 mL/Hr) IV Continuous <Continuous>  tamsulosin 0.4 milliGRAM(s) Oral at bedtime    MEDICATIONS  (PRN):  acetaminophen     Tablet .. 650 milliGRAM(s) Oral every 6 hours PRN Temp greater or equal to 38C (100.4F), Mild Pain (1 - 3)    Review of systems:    Constitutional: No fever, weight loss or fatigue    Eyes: No eye pain or discharge  ENMT:  No difficulty hearing; No sinus or throat pain  Neck: +chronic pain  Respiratory: No cough, wheezing, chills or hemoptysis  Cardiovascular: No chest pain, palpitations, shortness of breath, dyspnea on exertion  Gastrointestinal: No abdominal pain, nausea, vomiting or hematemesis; No diarrhea or constipation.   Genitourinary: No dysuria, frequency, hematuria or incontinence  Neurological: As per HPI  Skin: + multiple bruising  Endocrine: No heat or cold intolerance; No hair loss  Musculoskeletal: + chronic pain due to falls.  Psychiatric: No depression, anxiety, mood swings  Heme/Lymph: No easy bruising or bleeding gums    Vital Signs Last 24 Hrs  T(C): 36.2 (03 Jan 2024 04:29), Max: 36.4 (02 Jan 2024 15:12)  T(F): 97.2 (03 Jan 2024 04:29), Max: 97.6 (02 Jan 2024 15:12)  HR: 62 (03 Jan 2024 04:29) (62 - 71)  BP: 182/72 (03 Jan 2024 04:29) (132/51 - 182/72)  BP(mean): --  RR: 18 (03 Jan 2024 04:29) (18 - 18)  SpO2: 97% (03 Jan 2024 04:29) (97% - 100%)    Parameters below as of 03 Jan 2024 04:29  Patient On (Oxygen Delivery Method): room air    Neurologic Examination:  General:  Appearance is consistent with chronologic age.  No abnormal facies.   General: The patient is oriented to person, and place, .  Recent and remote memory intact.  Fund of knowledge is intact and normal.  Language with normal repetition, comprehension and naming.  Nondysarthric.    Cranial nerves: Glasses present,  VFF.  EOMI w/o nystagmus, skew or reported double vision.  PERRL.  No ptosis/weakness of eyelid closure.  Facial sensation is normal with normal bite.  + chronic Left facial droop.  Hearing grossly intact b/l.  Palate elevates midline.  Tongue midline. Poor dentition.  Motor examination:   Normal tone, bulk and range of motion.  No tenderness, twitching, tremors or involuntary movements.  Formal Muscle Strength Testing: (MRC grade R/L) 5/5 UE; 5/5 LE.  No observable drift.  Reflexes:   2+ b/l, triceps, brachioradialis, patella and Achilles.  Plantar response downgoing b/l.  Jaw jerk, Silviano, clonus absent.  Sensory examination:   Intact to light touch  and proprioception in all extremities.  Cerebellum:   FTN/HKS intact with normal ELFEGO in all limbs.  No dysmetria or dysdiadokinesia.      Labs:   CBC Full  -  ( 03 Jan 2024 04:30 )  WBC Count : 11.08 K/uL  RBC Count : 2.80 M/uL  Hemoglobin : 8.5 g/dL  Hematocrit : 25.9 %  Platelet Count - Automated : 236 K/uL  Mean Cell Volume : 92.5 fL  Mean Cell Hemoglobin : 30.4 pg  Mean Cell Hemoglobin Concentration : 32.8 g/dL      01-03    139  |  105  |  58<H>  ----------------------------<  87  4.4   |  19  |  1.9<H>    Ca    7.9<L>      03 Jan 2024 04:30    TPro  6.0  /  Alb  3.8  /  TBili  2.3<H>  /  DBili  x   /  AST  85<H>  /  ALT  39  /  AlkPhos  102  01-02    LIVER FUNCTIONS - ( 02 Jan 2024 17:00 )  Alb: 3.8 g/dL / Pro: 6.0 g/dL / ALK PHOS: 102 U/L / ALT: 39 U/L / AST: 85 U/L / GGT: x           PT/INR - ( 02 Jan 2024 17:00 )   PT: 11.70 sec;   INR: 1.03 ratio         PTT - ( 02 Jan 2024 17:00 )  PTT:26.8 sec  Urinalysis Basic - ( 03 Jan 2024 04:30 )    Color: x / Appearance: x / SG: x / pH: x  Gluc: 87 mg/dL / Ketone: x  / Bili: x / Urobili: x   Blood: x / Protein: x / Nitrite: x   Leuk Esterase: x / RBC: x / WBC x   Sq Epi: x / Non Sq Epi: x / Bacteria: x    Neuroimaging:  NCHCT: CT Head No Cont:   ACC: [id number] EXAM:  CT CERVICAL SPINE   ORDERED BY: DAVID FERRER     ACC: [id number] EXAM:  CT BRAIN   ORDERED BY: DAVID FERRER     PROCEDURE DATE:  01/02/2024      INTERPRETATION:  CLINICAL HISTORY / REASON FOR EXAM: Trauma. Multiple   falls. AMS. confusion.    TECHNIQUE: HEAD CT WITHOUT IV CONTRAST.. Contiguous unenhanced CT axial   images of the head from the base to the vertex with coronal and sagittal   reformats.    TECHNIQUE:  CT CERVICAL SPINE WITHOUT IV CONTRAST. Contiguous unenhanced   CT axial images of the cervical spine with coronal and sagittal   re-formations.    Comparison: CT head dated 11/6/2023; CT Neck dated 4/3/2023    FINDINGS - HEAD CT:    Stable bilateral basal ganglia, and cortical and cerebellar calcifications    There is prominence of the ventricles, cortical sulci, and basal cisterns   consistent with volume loss appropriate for the patient's age.    Grey-white matter differentiation is preserved.    Mild carotid siphon calcifications are noted.    There are patchy hypodensities throughout the hemispheric white matter   without mass effect compatible with chronic microvascular changes.    The fourth ventricle is midline.    There is no acute territorial infarct, intracranial hemorrhage,   extra-axial fluid collection or midline shift.    Calvarium is intact. No evidence of depressed skull fracture.    The visualized paranasal sinuses and mastoids are well-aerated.    FINDINGS ? C-SPINE CT:    The visualized pre-vertebral and paraspinal soft tissues are unremarkable.    There is preservation of the normal curvature of the C-spine.    There is no evidence of acute fracture, compression deformity or facet   subluxation.    The odontoid and atlantoaxial relationships are intact. Degenerative   ligamentous calcification is noted in the region of the atlantoaxial   articulation.    Degenerative changes with disc space narrowing and large anterior   osteophyte formation at C4-5, C5-6, and C6-7. There is encroachment upon   the spinal canal by osteophytes at C5-6 and C6-7. (Please note spinal   canal contents are suboptimally evaluated inherent to CT technique.)    Multilevel mild facet joint and uncovertebral joint hypertrophy is noted.    There is encroachment upon the neural foramina on the left side at C2-3   and C3-4, and bilaterally at C6-7.    There is mild anterolisthesis at C7-T1.    Apical pleural calcifications are noted. Bilateral carotid calcifications   are noted.    IMPRESSION:    HEAD CT:    No evidence of acute intracranial hemorrhage or acute territorial infarct.    Stable bilateral basal ganglia, and cortical and cerebellar calcifications      C-SPINE CT:    Degenerative changes as described above.    No evidence of fracture or facet subluxation.    --- End of Report ---      SOO PEPE MD; Attending Interventional Radiologist  This document has been electronically signed. Jan 2 2024  6:26PM (01-02-24 @ 17:29)       Neurology Consult  [id number]    Patient is a 73y old  Male who presents with a chief complaint of confusion and falls (03 Jan 2024 10:25)      HPI:  pt is a 74yo M PMHx  of HTN, DL, BPH, depressive psychosis  presents to ER with daughter c/o confusion and frequent falls. As per daughter earlier today pt had turned the gas on the stove but was not cooking anything, the house smelled of gas and pt did not notice. Pt is poor historian AAO x 2, unable to give detailed medical history. Daughter notes pt has had multiple falls recently, no LOC, head trauma. Pt had syncopal event in November this year with negative neurologic w/u. Denies fever, chills, cp, sob, n/v/d, abdominal pain, dysuria.  Pt seen at bedside with Dr. Khan, patient is awake and oriented as above. Pt states he admits to having falls, lives alone, and has aches and pains in multiple areas. Patient states no headache no dizziness no visual complaints. Pt admits to chronic left sided facial assymetry.  Follows with Dr. Castillo for neurology.      PAST MEDICAL & SURGICAL HISTORY:  Hypertension  BPH (benign prostatic hyperplasia)  Skin cancer  Anxiety  Depression  Hypercholesteremia  Status post Mohs surgery  Inguinal hernia  left 2 years ago    FAMILY HISTORY:    Allergies    No Known Allergies    Intolerances    MEDICATIONS  (STANDING):  amLODIPine   Tablet 2.5 milliGRAM(s) Oral daily  atorvastatin 10 milliGRAM(s) Oral at bedtime  ferrous    sulfate 325 milliGRAM(s) Oral daily  OLANZapine 2.5 milliGRAM(s) Oral at bedtime  pantoprazole    Tablet 40 milliGRAM(s) Oral before breakfast  PARoxetine 40 milliGRAM(s) Oral daily  sodium chloride 0.9%. 1000 milliLiter(s) (75 mL/Hr) IV Continuous <Continuous>  tamsulosin 0.4 milliGRAM(s) Oral at bedtime    MEDICATIONS  (PRN):  acetaminophen     Tablet .. 650 milliGRAM(s) Oral every 6 hours PRN Temp greater or equal to 38C (100.4F), Mild Pain (1 - 3)    Review of systems:    Constitutional: No fever, weight loss or fatigue    Eyes: No eye pain or discharge  ENMT:  No difficulty hearing; No sinus or throat pain  Neck: +chronic pain  Respiratory: No cough, wheezing, chills or hemoptysis  Cardiovascular: No chest pain, palpitations, shortness of breath, dyspnea on exertion  Gastrointestinal: No abdominal pain, nausea, vomiting or hematemesis; No diarrhea or constipation.   Genitourinary: No dysuria, frequency, hematuria or incontinence  Neurological: As per HPI  Skin: + multiple bruising  Endocrine: No heat or cold intolerance; No hair loss  Musculoskeletal: + chronic pain due to falls.  Psychiatric: No depression, anxiety, mood swings  Heme/Lymph: No easy bruising or bleeding gums    Vital Signs Last 24 Hrs  T(C): 36.2 (03 Jan 2024 04:29), Max: 36.4 (02 Jan 2024 15:12)  T(F): 97.2 (03 Jan 2024 04:29), Max: 97.6 (02 Jan 2024 15:12)  HR: 62 (03 Jan 2024 04:29) (62 - 71)  BP: 182/72 (03 Jan 2024 04:29) (132/51 - 182/72)  BP(mean): --  RR: 18 (03 Jan 2024 04:29) (18 - 18)  SpO2: 97% (03 Jan 2024 04:29) (97% - 100%)    Parameters below as of 03 Jan 2024 04:29  Patient On (Oxygen Delivery Method): room air    Neurologic Examination:  General:  Appearance is consistent with chronologic age.  No abnormal facies.   General: The patient is oriented to person, and place, .  Recent and remote memory intact.  Fund of knowledge is intact and normal.  Language with normal repetition, comprehension and naming.  Nondysarthric.    Cranial nerves: Glasses present,  VFF.  EOMI w/o nystagmus, skew or reported double vision.  PERRL.  No ptosis/weakness of eyelid closure.  Facial sensation is normal with normal bite.  + chronic Left facial droop.  Hearing grossly intact b/l.  Palate elevates midline.  Tongue midline. Poor dentition.  Motor examination:   Normal tone, bulk and range of motion.  No tenderness, twitching, tremors or involuntary movements.  Formal Muscle Strength Testing: (MRC grade R/L) 5/5 UE; 5/5 LE.  No observable drift.  Reflexes:   2+ b/l, triceps, brachioradialis, patella and Achilles.  Plantar response downgoing b/l.  Jaw jerk, Silviano, clonus absent.  Sensory examination:   Intact to light touch  and proprioception in all extremities.  Cerebellum:   FTN/HKS intact with normal ELFEGO in all limbs.  No dysmetria or dysdiadokinesia.      Labs:   CBC Full  -  ( 03 Jan 2024 04:30 )  WBC Count : 11.08 K/uL  RBC Count : 2.80 M/uL  Hemoglobin : 8.5 g/dL  Hematocrit : 25.9 %  Platelet Count - Automated : 236 K/uL  Mean Cell Volume : 92.5 fL  Mean Cell Hemoglobin : 30.4 pg  Mean Cell Hemoglobin Concentration : 32.8 g/dL      01-03    139  |  105  |  58<H>  ----------------------------<  87  4.4   |  19  |  1.9<H>    Ca    7.9<L>      03 Jan 2024 04:30    TPro  6.0  /  Alb  3.8  /  TBili  2.3<H>  /  DBili  x   /  AST  85<H>  /  ALT  39  /  AlkPhos  102  01-02    LIVER FUNCTIONS - ( 02 Jan 2024 17:00 )  Alb: 3.8 g/dL / Pro: 6.0 g/dL / ALK PHOS: 102 U/L / ALT: 39 U/L / AST: 85 U/L / GGT: x           PT/INR - ( 02 Jan 2024 17:00 )   PT: 11.70 sec;   INR: 1.03 ratio         PTT - ( 02 Jan 2024 17:00 )  PTT:26.8 sec  Urinalysis Basic - ( 03 Jan 2024 04:30 )    Color: x / Appearance: x / SG: x / pH: x  Gluc: 87 mg/dL / Ketone: x  / Bili: x / Urobili: x   Blood: x / Protein: x / Nitrite: x   Leuk Esterase: x / RBC: x / WBC x   Sq Epi: x / Non Sq Epi: x / Bacteria: x    Neuroimaging:  NCHCT: CT Head No Cont:   ACC: [id number] EXAM:  CT CERVICAL SPINE   ORDERED BY: DAVID FERRER     ACC: [id number] EXAM:  CT BRAIN   ORDERED BY: DAVID FERRER     PROCEDURE DATE:  01/02/2024      INTERPRETATION:  CLINICAL HISTORY / REASON FOR EXAM: Trauma. Multiple   falls. AMS. confusion.    TECHNIQUE: HEAD CT WITHOUT IV CONTRAST.. Contiguous unenhanced CT axial   images of the head from the base to the vertex with coronal and sagittal   reformats.    TECHNIQUE:  CT CERVICAL SPINE WITHOUT IV CONTRAST. Contiguous unenhanced   CT axial images of the cervical spine with coronal and sagittal   re-formations.    Comparison: CT head dated 11/6/2023; CT Neck dated 4/3/2023    FINDINGS - HEAD CT:    Stable bilateral basal ganglia, and cortical and cerebellar calcifications    There is prominence of the ventricles, cortical sulci, and basal cisterns   consistent with volume loss appropriate for the patient's age.    Grey-white matter differentiation is preserved.    Mild carotid siphon calcifications are noted.    There are patchy hypodensities throughout the hemispheric white matter   without mass effect compatible with chronic microvascular changes.    The fourth ventricle is midline.    There is no acute territorial infarct, intracranial hemorrhage,   extra-axial fluid collection or midline shift.    Calvarium is intact. No evidence of depressed skull fracture.    The visualized paranasal sinuses and mastoids are well-aerated.    FINDINGS ? C-SPINE CT:    The visualized pre-vertebral and paraspinal soft tissues are unremarkable.    There is preservation of the normal curvature of the C-spine.    There is no evidence of acute fracture, compression deformity or facet   subluxation.    The odontoid and atlantoaxial relationships are intact. Degenerative   ligamentous calcification is noted in the region of the atlantoaxial   articulation.    Degenerative changes with disc space narrowing and large anterior   osteophyte formation at C4-5, C5-6, and C6-7. There is encroachment upon   the spinal canal by osteophytes at C5-6 and C6-7. (Please note spinal   canal contents are suboptimally evaluated inherent to CT technique.)    Multilevel mild facet joint and uncovertebral joint hypertrophy is noted.    There is encroachment upon the neural foramina on the left side at C2-3   and C3-4, and bilaterally at C6-7.    There is mild anterolisthesis at C7-T1.    Apical pleural calcifications are noted. Bilateral carotid calcifications   are noted.    IMPRESSION:    HEAD CT:    No evidence of acute intracranial hemorrhage or acute territorial infarct.    Stable bilateral basal ganglia, and cortical and cerebellar calcifications      C-SPINE CT:    Degenerative changes as described above.    No evidence of fracture or facet subluxation.    --- End of Report ---      SOO PEPE MD; Attending Interventional Radiologist  This document has been electronically signed. Jan 2 2024  6:26PM (01-02-24 @ 17:29)

## 2024-01-03 NOTE — OCCUPATIONAL THERAPY INITIAL EVALUATION ADULT - GENERAL OBSERVATIONS, REHAB EVAL
10:22-10;45 Chart reviewed, ok to treat by Occupational Therapist as confirmed by RN [place], Pt received seated in recliner chair with JULIA Erickson in NAD. Pt in agreement with OT IE.

## 2024-01-03 NOTE — OCCUPATIONAL THERAPY INITIAL EVALUATION ADULT - ADDITIONAL COMMENTS
PLOF obtained from patient and daughter (PT Georgina spoke to dtr via telephone). (-) driving for past couple months

## 2024-01-03 NOTE — CONSULT NOTE ADULT - ASSESSMENT
73yMale pmh HTN, DL, BPH presents for confusion and falls. GI consulted for anemia no gross GI bleeding. patient reports he never had a GI workup before.    #anemia no gross GI bleeding  Rec  -workup for confusion  -will consider EGD/Colonoscopy next week after workup for confusion complete and patient at baseline  -Maintain Hemodynamic Stability   -Monitor CBC  -CMP,Optimize Electrolytes  -PT,PTT,INR  -EKG, Chest-Xray   -Transfuse prn to hgb >8  -Two large bore IV lines  - PPI BID  -Monitor Vital Signs  -Monitor Stool For blood, frequency, consistency, melena  -Active Type and Screen  -Iron Studies, Folate, Vitamin B12 levels     #Compression fracture of the superior endplate of L1 of indeterminate age.  Rec  - Care as per primary team     #A right-sided hydrocele is noted in the right inguinal canal  Rec  - Care as per primary team  73yMale pmh HTN, DL, BPH presents for confusion and falls. GI consulted for anemia no gross GI bleeding. patient reports he never had a GI workup before.    #anemia no gross GI bleeding  normocytic, likely multifactorial anemia of chronic disease, possible avms, PUD?  Rec  -workup for confusion  -will consider EGD/Colonoscopy next week after workup for confusion complete and patient at baseline  -Maintain Hemodynamic Stability   -Monitor CBC  -CMP,Optimize Electrolytes  -PT,PTT,INR  -EKG, Chest-Xray   -Transfuse prn to hgb >8  -Two large bore IV lines  - PPI BID  -Monitor Vital Signs  -Monitor Stool For blood, frequency, consistency, melena  -Active Type and Screen  -Iron Studies, Folate, Vitamin B12 levels     #Compression fracture of the superior endplate of L1 of indeterminate age.  Rec  - Care as per primary team     #A right-sided hydrocele is noted in the right inguinal canal  Rec  - Care as per primary team  73yMale pmh HTN, DL, BPH presents for confusion and falls. GI consulted for anemia no gross GI bleeding. patient reports he never had a GI workup before.    #anemia no gross GI bleeding  normocytic, likely multifactorial anemia of chronic disease, possible avms, PUD?  patient with ecchymosis on back, b/l on gluteal area and perianal areas  rectal exam-reveals brown stool  Rec  -workup for confusion  -while we do not suspect GI bleed to be source of anemia will consider EGD/Colonoscopy next week after workup for confusion complete and patient at baseline and other sources of anemia ruled out given falls and ecchymosis   -Maintain Hemodynamic Stability   -Monitor CBC  -CMP,Optimize Electrolytes  -PT,PTT,INR  -EKG, Chest-Xray   -Transfuse prn to hgb >8  -Two large bore IV lines  - PPI BID  -Monitor Vital Signs  -Monitor Stool For blood, frequency, consistency, melena  -Active Type and Screen  -Iron Studies, Folate, Vitamin B12 levels     #Compression fracture of the superior endplate of L1 of indeterminate age.  Rec  - Care as per primary team     #A right-sided hydrocele is noted in the right inguinal canal  Rec  - Care as per primary team

## 2024-01-03 NOTE — PHYSICAL THERAPY INITIAL EVALUATION ADULT - GENERAL OBSERVATIONS, REHAB EVAL
10:05 - 10:30 Chart reviewed. Order received.  Patient is ok to be  seen for PT,confirmed with RN. pt encountered  Semi lemons  In the bed denies pain, and agrees to participate in session, +  Styles NAD.

## 2024-01-03 NOTE — CONSULT NOTE ADULT - ASSESSMENT
NACHO / creat in 11/2023 was 0.9  mild rhabdo- trend CPK  received contrast on 1/2/24  renal ultrasound reviewed, no hydronephrosis  f/u repeat creat this am, on iv fluids- change to LR at 75cc/hr  cont holding losartan  obtain UA, urine lytes, urine prot/creat ratio  has anemia as well-- check serum free light chain ratio, spep/sife, upep/gary  strict i/o please NACHO / creat in 11/2023 was 0.9  mild rhabdo- trend CPK  received contrast on 1/2/24  renal ultrasound reviewed, no hydronephrosis  f/u repeat creat this am, on iv fluids- change to LR at 75cc/hr  cont holding losartan  obtain UA, urine lytes, urine prot/creat ratio  has anemia as well-- check serum free light chain ratio, spep/sife, upep/gary  strict i/o please  check orthostatics

## 2024-01-03 NOTE — PROGRESS NOTE ADULT - SUBJECTIVE AND OBJECTIVE BOX
BETTYEKY MATTHEW  73y  Male      Patient is a 73y old  Male who presents with a chief complaint of confusion and falls (03 Jan 2024 12:05)      INTERVAL HPI/OVERNIGHT EVENTS:  Patient seen and examined earlier this morning; patient denies any new complaints; reports he fell at home. History limited due to confusion.      REVIEW OF SYSTEMS: limited due to condition    Vital Signs Last 24 Hrs  T(C): 35.6 (03 Jan 2024 14:35), Max: 36.3 (03 Jan 2024 00:33)  T(F): 96.1 (03 Jan 2024 14:35), Max: 97.4 (03 Jan 2024 00:33)  HR: 64 (03 Jan 2024 14:35) (62 - 65)  BP: 128/71 (03 Jan 2024 14:35) (128/71 - 182/72)  BP(mean): --  RR: 18 (03 Jan 2024 14:35) (18 - 18)  SpO2: 97% (03 Jan 2024 04:29) (97% - 97%)    Parameters below as of 03 Jan 2024 04:29  Patient On (Oxygen Delivery Method): room air  PHYSICAL EXAM:  SKIN: warm, dry, +Multiple areas of bruising in different stages of healing  HEAD: Normocephalic; old superficial abrasions   EYES: PERRL, EOMI, normal sclera and conjunctiva   NECK: Supple; non tender.  CARD:  Regular rate and rhythm.   RESP: CTAb/l, no w/r/r  ABD: soft ntnd, VIRGIE negative.  +Bruising to bilateral buttocks.  EXT: Normal ROM.    NEURO: AAOx2 moving all extremities  PSYCH: Cooperative, appropriate.    Consultant(s) Notes Reviewed:  [x ] YES  [ ] NO  Care Discussed with Consultants/Other Providers [ x] YES  [ ] NO    LAB:                        8.5    11.08 )-----------( 236      ( 03 Jan 2024 04:30 )             25.9     01-03    139  |  105  |  58<H>  ----------------------------<  87  4.4   |  19  |  1.9<H>    Ca    7.9<L>      03 Jan 2024 04:30    TPro  6.0  /  Alb  3.8  /  TBili  2.3<H>  /  DBili  x   /  AST  85<H>  /  ALT  39  /  AlkPhos  102  01-02    LIVER FUNCTIONS - ( 02 Jan 2024 17:00 )  Alb: 3.8 g/dL / Pro: 6.0 g/dL / ALK PHOS: 102 U/L / ALT: 39 U/L / AST: 85 U/L / GGT: x           CARDIAC MARKERS ( 03 Jan 2024 04:30 )  x     / x     / 1401 U/L / x     / x      CARDIAC MARKERS ( 02 Jan 2024 21:20 )  x     / x     / 1928 U/L / x     / x                  Drug Dosing Weight  Height (cm): 165.1 (03 Jan 2024 00:33)  Weight (kg): 54.7 (03 Jan 2024 00:33)  BMI (kg/m2): 20.1 (03 Jan 2024 00:33)  BSA (m2): 1.6 (03 Jan 2024 00:33)  Height (cm): 165.1 (01-03-24 @ 00:33)  Weight (kg): 54.7 (01-03-24 @ 00:33)  BMI (kg/m2): 20.1 (01-03-24 @ 00:33)  BSA (m2): 1.6 (01-03-24 @ 00:33)  CAPILLARY BLOOD GLUCOSE        I&O's Summary    Urinalysis Basic - ( 03 Jan 2024 04:30 )    Color: x / Appearance: x / SG: x / pH: x  Gluc: 87 mg/dL / Ketone: x  / Bili: x / Urobili: x   Blood: x / Protein: x / Nitrite: x   Leuk Esterase: x / RBC: x / WBC x   Sq Epi: x / Non Sq Epi: x / Bacteria: x        RADIOLOGY & ADDITIONAL TESTS:  Imaging Personally Reviewed:  [x] YES  [ ] NO    HEALTH ISSUES - PROBLEM Dx:          MEDS:  acetaminophen     Tablet .. 650 milliGRAM(s) Oral every 6 hours PRN  amLODIPine   Tablet 2.5 milliGRAM(s) Oral daily  atorvastatin 10 milliGRAM(s) Oral at bedtime  ferrous    sulfate 325 milliGRAM(s) Oral daily  OLANZapine 2.5 milliGRAM(s) Oral at bedtime  pantoprazole    Tablet 40 milliGRAM(s) Oral before breakfast  PARoxetine 40 milliGRAM(s) Oral daily  sodium chloride 0.9%. 1000 milliLiter(s) IV Continuous <Continuous>  tamsulosin 0.4 milliGRAM(s) Oral at bedtime

## 2024-01-03 NOTE — CONSULT NOTE ADULT - NS ATTEND AMEND GEN_ALL_CORE FT
72 yo M w/ h/o HTN, DL, BPH, depressive psychosis admitted for acute confusion and worsening ataxia found to have leukocytosis with anemia and NACHO suspect TME.  Has evidence of b/l BG calcinosis recommend r/o endocrinopathies.  Primary team to address underlying medical issues and if still encephalopathic after correction, recommend contact us for followup.  Otherwise f/u with Dr. Castillo as outpt upon discharge.
Patient seen and examine don GI rounds. Labs and imaging reviewed by me. Plan modified above where needed.

## 2024-01-03 NOTE — OCCUPATIONAL THERAPY INITIAL EVALUATION ADULT - EATING, PREVIOUS LEVEL OF FUNCTION, OT EVAL
Physical Therapy  Facility/Department: [facility] MED SURG [id number]  Physical Therapy Discharge      NAME: Parish Ness    : 1950 (44 y.o.)  MRN: [id number]    Account: [de-identified]  Gender: female      Patient has been discharged from acute care hospital. DC patient from current PT program.      Electronically signed by Rosenda Aguirre PT on 21 at 4:00 PM EST independent

## 2024-01-03 NOTE — PHYSICAL THERAPY INITIAL EVALUATION ADULT - ADDITIONAL COMMENTS
as pert pt and Daughter pt lives  alone in a PH W/ 4 steps to enter W/o HR and 12 steps to main living area W/ LT HR and 4 steps to bedroom and Shower W/o HR , as per pt he was independent W/ Functional Activity PTA

## 2024-01-03 NOTE — PROGRESS NOTE ADULT - ASSESSMENT
74yo M PMHx  of HTN, DL, BPH, depression presents to ER with daughter c/o confusion and frequent falls, found to have NACHO and anemia.     #Multiple Falls & Confusion likely Metabolic Encephalopathy  #frequent falls   - f/u UA, infectious work up (negative)  - CT head negative for acute pathology   - pt/ot/rehab   - pt with worsening confusion as per daughter, follows Dr Castillo as out patient (recommended decreasing v tapering off zyprexa (Psych Dr decreased dose, but does not agree it is causing symptoms)   - neuro consult appreciated    #NACHO likely pre-renal  - renal ultrasound reviewed, no hydronephrosis  PLAN  - renal on board  - LR at 75cc/hr  - cont holding losartan  - obtain UA, urine lytes, urine prot/creat ratio  - f/u serum free light chain ratio, spep/sife, upep/gary    #Acute on chronic anemia likely multifactorial anemia of chronic disease, possible avms, PUD?  - s/p 1 u pRBCs   PLAN  - GI on board  - GI will consider EGD/Colonoscopy next week after workup for confusion complete and patient at baseline and other sources of anemia ruled out given falls and ecchymosis   - Transfuse prn to hgb >7  - Two large bore IV lines  - PPI     #H/O HTN  #H/O DL  - hold losartan   - c/w home statin    #H/O BPH   - c/w tamsulosin    #H/O depressive psychosis   - c/w Paxil and Zyprexa  - pt follows Dr RESENDEZ ( via televisit but pt has had trouble with sessions and recommended in person eval)    DVT PPx: Scds   Diet:  DASH  Activity: IAT  Code: Full code    Progress Note Handoff  Pending Consults: None  Pending Tests: urine studies, blood works  Pending Results: clinical improvement   Family Discussion: Patient   Disposition: Home__Xacute___/SNF______/Other_____/Unknown at this time_____  Spent over 55 min reviewing chart, speaking with patient/family and on coordinating patient care during interdisciplinary rounds  72yo M PMHx  of HTN, DL, BPH, depression presents to ER with daughter c/o confusion and frequent falls, found to have NACHO and anemia.     #Multiple Falls & Confusion likely Metabolic Encephalopathy  #frequent falls   - f/u UA, infectious work up (negative)  - CT head negative for acute pathology   - pt/ot/rehab   - pt with worsening confusion as per daughter, follows Dr Castillo as out patient (recommended decreasing v tapering off zyprexa (Psych Dr decreased dose, but does not agree it is causing symptoms)   - neuro consult appreciated    #NACHO likely pre-renal  - renal ultrasound reviewed, no hydronephrosis  PLAN  - renal on board  - LR at 75cc/hr  - cont holding losartan  - obtain UA, urine lytes, urine prot/creat ratio  - f/u serum free light chain ratio, spep/sife, upep/gary    #Acute on chronic anemia likely multifactorial anemia of chronic disease, possible avms, PUD?  - s/p 1 u pRBCs   PLAN  - GI on board  - GI will consider EGD/Colonoscopy next week after workup for confusion complete and patient at baseline and other sources of anemia ruled out given falls and ecchymosis   - Transfuse prn to hgb >7  - Two large bore IV lines  - PPI     #H/O HTN  #H/O DL  - hold losartan   - c/w home statin    #H/O BPH   - c/w tamsulosin    #H/O depressive psychosis   - c/w Paxil and Zyprexa  - pt follows Dr RESENDEZ ( via televisit but pt has had trouble with sessions and recommended in person eval)    DVT PPx: Scds   Diet:  DASH  Activity: IAT  Code: Full code    Progress Note Handoff  Pending Consults: None  Pending Tests: urine studies, blood works  Pending Results: clinical improvement   Family Discussion: Patient   Disposition: Home__Xacute___/SNF______/Other_____/Unknown at this time_____  Spent over 55 min reviewing chart, speaking with patient/family and on coordinating patient care during interdisciplinary rounds

## 2024-01-03 NOTE — CONSULT NOTE ADULT - SUBJECTIVE AND OBJECTIVE BOX
NEPHROLOGY CONSULTATION NOTE    MATTHEW RAO  73y  Male  MRN-[id number]    CC:   Patient is a 73y old  Male who presents with a chief complaint of confusion and falls (02 Jan 2024 20:26)      HPI:  pt is a 74yo M PMHx  of HTN, DL, BPH, depression presents to ER with daughter c/o confusion and frequent falls. As per daughter earlier today pt had turned the gas on the stove but was not cooking anything, the house smelled of gas and pt did not notice. Pt is poor historian AAO x 2, unable to give detailed medical history. Daughter notes pt has has multiple falls recently, no LOC, head trauma. Pt had syncopal event in November this year. Denies fever, chills, cp, sob, n/v/d, abdominal pain, dysuria.        (02 Jan 2024 20:26)      PAST MEDICAL & SURGICAL HISTORY:  Hypertension      BPH (benign prostatic hyperplasia)      Skin cancer      Anxiety      Depression      Hypercholesteremia      Status post Mohs surgery      Inguinal hernia  left 2 years ago        Allergies:  No Known Allergies    Home Medications Reviewed  Hospital Medications:   MEDICATIONS  (STANDING):  amLODIPine   Tablet 2.5 milliGRAM(s) Oral daily  atorvastatin 10 milliGRAM(s) Oral at bedtime  ferrous    sulfate 325 milliGRAM(s) Oral daily  OLANZapine 2.5 milliGRAM(s) Oral at bedtime  pantoprazole    Tablet 40 milliGRAM(s) Oral before breakfast  PARoxetine 40 milliGRAM(s) Oral daily  sodium chloride 0.9%. 1000 milliLiter(s) (75 mL/Hr) IV Continuous <Continuous>  tamsulosin 0.4 milliGRAM(s) Oral at bedtime    MEDICATIONS  (PRN):  acetaminophen     Tablet .. 650 milliGRAM(s) Oral every 6 hours PRN Temp greater or equal to 38C (100.4F), Mild Pain (1 - 3)    Home Medications:  losartan 100 mg oral tablet: 1 tab(s) orally once a day (02 Jan 2024 20:04)  PARoxetine 40 mg oral tablet: 1 tab(s) orally once a day (02 Jan 2024 20:04)  pravastatin 20 mg oral tablet: 1 tab(s) orally once a day (02 Jan 2024 20:04)  ZyPREXA 5 mg oral tablet: 1 tab(s) orally once a day (02 Jan 2024 20:04)      SOCIAL HISTORY:  Social History:      FAMILY HISTORY:      REVIEW OF SYSTEMS:  All other review of systems is negative unless indicated above.    VITALS:  T(F): 97.2 (01-03-24 @ 04:29), Max: 97.6 (01-02-24 @ 15:12)  HR: 62 (01-03-24 @ 04:29)  BP: 182/72 (01-03-24 @ 04:29)  RR: 18 (01-03-24 @ 04:29)  SpO2: 97% (01-03-24 @ 04:29)    Height (cm): 165.1 (01-03 @ 00:33)  Weight (kg): 54.7 (01-03 @ 00:33)  BMI (kg/m2): 20.1 (01-03 @ 00:33)  BSA (m2): 1.6 (01-03 @ 00:33)  I&O's Detail      I&O's Summary      PHYSICAL EXAM:  Gen: NAD  resp: b/l breath sounds  card: S1/S2  abd: soft  ext: no edema    LABS:  Daily Height in cm: 165.1 (03 Jan 2024 00:33)    Daily     Lactate, Blood: 1.3 mmol/L (01-02-24 @ 17:00)    01-02    134<L>  |  101  |  66<HH>  ----------------------------<  97  4.6   |  18  |  2.5<H>    Ca    8.4      02 Jan 2024 21:20    TPro  6.0  /  Alb  3.8  /  TBili  2.3<H>  /  DBili      /  AST  85<H>  /  ALT  39  /  AlkPhos  102  01-02      Creatine Kinase, Serum: 1928 U/L (01-02-24 @ 21:20)      Creatinine Trend:   Creatinine: 2.5 mg/dL (01-02-24 @ 21:20)  Creatinine: 2.6 mg/dL (01-02-24 @ 17:00)  Creatinine: 0.9 mg/dL (11-08-23 @ 11:25)  Creatinine: 1.1 mg/dL (11-07-23 @ 07:31)  Creatinine: 1.3 mg/dL (11-06-23 @ 13:00)  Creatinine: 1.3 mg/dL (10-19-23 @ 13:29)  Creatinine, Serum: 1.0 mg/dL (02-13-19 @ 12:40)                            8.5    11.08 )-----------( 236      ( 03 Jan 2024 04:30 )             25.9     Mean Cell Volume: 92.5 fL (01-03-24 @ 04:30)    Urine Studies:  Protein, Urine: Negative mg/dL (02-13-19 @ 12:40)              RADIOLOGY & ADDITIONAL STUDIES:        US Renal:   ACC: [id number] EXAM:  US KIDNEY(S)   ORDERED BY: SHAWN HUERTA     PROCEDURE DATE:  01/02/2024          INTERPRETATION:  CLINICAL INFORMATION: NACHO    COMPARISON: CT of abdomen pelvis dated 1/2/2024    TECHNIQUE: Sonography of the kidneys.    FINDINGS:    Right kidney: 9.7 cm. No hydronephrosis or calculi..    Left kidney:  10.8 cm. No hydronephrosis or calculi..      IMPRESSION:    No hydronephrosis .        --- End of Report ---          MORAIMA JUNG MD; Resident Radiologist  This documenthas been electronically signed.  KARENA DARDEN MD; Attending Radiologist  This document has been electronically signed. Sudheer  3 2024  9:10AM (01-02-24 @ 20:23)                     NEPHROLOGY CONSULTATION NOTE    MATTHEW RAO  73y  Male  MRN-[id number]    CC:   Patient is a 73y old  Male who presents with a chief complaint of confusion and falls (02 Jan 2024 20:26)      HPI:  pt is a 72yo M PMHx  of HTN, DL, BPH, depression presents to ER with daughter c/o confusion and frequent falls. As per daughter earlier today pt had turned the gas on the stove but was not cooking anything, the house smelled of gas and pt did not notice. Pt is poor historian AAO x 2, unable to give detailed medical history. Daughter notes pt has has multiple falls recently, no LOC, head trauma. Pt had syncopal event in November this year. Denies fever, chills, cp, sob, n/v/d, abdominal pain, dysuria.        (02 Jan 2024 20:26)      PAST MEDICAL & SURGICAL HISTORY:  Hypertension      BPH (benign prostatic hyperplasia)      Skin cancer      Anxiety      Depression      Hypercholesteremia      Status post Mohs surgery      Inguinal hernia  left 2 years ago        Allergies:  No Known Allergies    Home Medications Reviewed  Hospital Medications:   MEDICATIONS  (STANDING):  amLODIPine   Tablet 2.5 milliGRAM(s) Oral daily  atorvastatin 10 milliGRAM(s) Oral at bedtime  ferrous    sulfate 325 milliGRAM(s) Oral daily  OLANZapine 2.5 milliGRAM(s) Oral at bedtime  pantoprazole    Tablet 40 milliGRAM(s) Oral before breakfast  PARoxetine 40 milliGRAM(s) Oral daily  sodium chloride 0.9%. 1000 milliLiter(s) (75 mL/Hr) IV Continuous <Continuous>  tamsulosin 0.4 milliGRAM(s) Oral at bedtime    MEDICATIONS  (PRN):  acetaminophen     Tablet .. 650 milliGRAM(s) Oral every 6 hours PRN Temp greater or equal to 38C (100.4F), Mild Pain (1 - 3)    Home Medications:  losartan 100 mg oral tablet: 1 tab(s) orally once a day (02 Jan 2024 20:04)  PARoxetine 40 mg oral tablet: 1 tab(s) orally once a day (02 Jan 2024 20:04)  pravastatin 20 mg oral tablet: 1 tab(s) orally once a day (02 Jan 2024 20:04)  ZyPREXA 5 mg oral tablet: 1 tab(s) orally once a day (02 Jan 2024 20:04)      SOCIAL HISTORY:  Social History:      FAMILY HISTORY:      REVIEW OF SYSTEMS:  All other review of systems is negative unless indicated above.    VITALS:  T(F): 97.2 (01-03-24 @ 04:29), Max: 97.6 (01-02-24 @ 15:12)  HR: 62 (01-03-24 @ 04:29)  BP: 182/72 (01-03-24 @ 04:29)  RR: 18 (01-03-24 @ 04:29)  SpO2: 97% (01-03-24 @ 04:29)    Height (cm): 165.1 (01-03 @ 00:33)  Weight (kg): 54.7 (01-03 @ 00:33)  BMI (kg/m2): 20.1 (01-03 @ 00:33)  BSA (m2): 1.6 (01-03 @ 00:33)  I&O's Detail      I&O's Summary      PHYSICAL EXAM:  Gen: NAD  resp: b/l breath sounds  card: S1/S2  abd: soft  ext: no edema    LABS:  Daily Height in cm: 165.1 (03 Jan 2024 00:33)    Daily     Lactate, Blood: 1.3 mmol/L (01-02-24 @ 17:00)    01-02    134<L>  |  101  |  66<HH>  ----------------------------<  97  4.6   |  18  |  2.5<H>    Ca    8.4      02 Jan 2024 21:20    TPro  6.0  /  Alb  3.8  /  TBili  2.3<H>  /  DBili      /  AST  85<H>  /  ALT  39  /  AlkPhos  102  01-02      Creatine Kinase, Serum: 1928 U/L (01-02-24 @ 21:20)      Creatinine Trend:   Creatinine: 2.5 mg/dL (01-02-24 @ 21:20)  Creatinine: 2.6 mg/dL (01-02-24 @ 17:00)  Creatinine: 0.9 mg/dL (11-08-23 @ 11:25)  Creatinine: 1.1 mg/dL (11-07-23 @ 07:31)  Creatinine: 1.3 mg/dL (11-06-23 @ 13:00)  Creatinine: 1.3 mg/dL (10-19-23 @ 13:29)  Creatinine, Serum: 1.0 mg/dL (02-13-19 @ 12:40)                            8.5    11.08 )-----------( 236      ( 03 Jan 2024 04:30 )             25.9     Mean Cell Volume: 92.5 fL (01-03-24 @ 04:30)    Urine Studies:  Protein, Urine: Negative mg/dL (02-13-19 @ 12:40)              RADIOLOGY & ADDITIONAL STUDIES:        US Renal:   ACC: [id number] EXAM:  US KIDNEY(S)   ORDERED BY: SHAWN HUERTA     PROCEDURE DATE:  01/02/2024          INTERPRETATION:  CLINICAL INFORMATION: NACHO    COMPARISON: CT of abdomen pelvis dated 1/2/2024    TECHNIQUE: Sonography of the kidneys.    FINDINGS:    Right kidney: 9.7 cm. No hydronephrosis or calculi..    Left kidney:  10.8 cm. No hydronephrosis or calculi..      IMPRESSION:    No hydronephrosis .        --- End of Report ---          MORAIMA JUNG MD; Resident Radiologist  This documenthas been electronically signed.  KARENA DARDEN MD; Attending Radiologist  This document has been electronically signed. Sudheer  3 2024  9:10AM (01-02-24 @ 20:23)

## 2024-01-03 NOTE — CONSULT NOTE ADULT - SUBJECTIVE AND OBJECTIVE BOX
Chief complaint/Reason for consult: anemia no gross GI bleeding    HPI:  pt is a 74yo M PMHx  of HTN, DL, BPH, depression presents to ER with daughter c/o confusion and frequent falls. As per daughter earlier today pt had turned the gas on the stove but was not cooking anything, the house smelled of gas and pt did not notice. Pt is poor historian AAO x 2, unable to give detailed medical history. Daughter notes pt has has multiple falls recently, no LOC, head trauma. Pt had syncopal event in November this year. Denies fever, chills, cp, sob, n/v/d, abdominal pain, dysuria.        (02 Jan 2024 20:26)    GI Updates: 73yMale pmh HTN, DL, BPH presents for confusion and falls. GI consulted for anemia no gross GI bleeding. Currently Patient denies nausea, vomiting, hematemesis, melena, blood in stool, diarrhea, constipation, abdominal pain.      PAST MEDICAL & SURGICAL HISTORY:   Hypertension      BPH (benign prostatic hyperplasia)      Skin cancer      Anxiety      Depression      Hypercholesteremia      Status post Mohs surgery      Inguinal hernia  left 2 years ago            Family history:  FAMILY HISTORY:    No GI cancers in first or second degree relatives    Social History: No smoking. No alcohol. No illegal drug use.    Allergies:   No Known Allergies  Intolerances    MEDICATIONS: Home Medications:  losartan 100 mg oral tablet: 1 tab(s) orally once a day (02 Jan 2024 20:04)  PARoxetine 40 mg oral tablet: 1 tab(s) orally once a day (02 Jan 2024 20:04)  pravastatin 20 mg oral tablet: 1 tab(s) orally once a day (02 Jan 2024 20:04)  ZyPREXA 5 mg oral tablet: 1 tab(s) orally once a day (02 Jan 2024 20:04)    MEDICATIONS  (STANDING):  amLODIPine   Tablet 2.5 milliGRAM(s) Oral daily  atorvastatin 10 milliGRAM(s) Oral at bedtime  ferrous    sulfate 325 milliGRAM(s) Oral daily  OLANZapine 2.5 milliGRAM(s) Oral at bedtime  pantoprazole    Tablet 40 milliGRAM(s) Oral before breakfast  PARoxetine 40 milliGRAM(s) Oral daily  sodium chloride 0.9%. 1000 milliLiter(s) (75 mL/Hr) IV Continuous <Continuous>  tamsulosin 0.4 milliGRAM(s) Oral at bedtime    MEDICATIONS  (PRN):  acetaminophen     Tablet .. 650 milliGRAM(s) Oral every 6 hours PRN Temp greater or equal to 38C (100.4F), Mild Pain (1 - 3)        REVIEW OF SYSTEMS  General:  No weight loss, fevers, or chills.  Eyes:  No reported pain or visual changes  ENT:  No sore throat or runny nose.  NECK: No stiffness or lymphadenopathy  CV:  No chest pain or palpitations.  Resp:  No shortness of breath, cough, wheezing or hemoptysis  GI:  No abdominal pain, nausea, vomiting, dysphagia, diarrhea or constipation. No rectal bleeding, melena, or hematemesis.  Muscle:  No aches or weakness  Neuro:  No tingling, numbness       VITALS:   T(F): 97.2 (01-03-24 @ 04:29), Max: 97.6 (01-02-24 @ 15:12)  HR: 62 (01-03-24 @ 04:29) (62 - 71)  BP: 182/72 (01-03-24 @ 04:29) (132/51 - 182/72)  RR: 18 (01-03-24 @ 04:29) (18 - 18)  SpO2: 97% (01-03-24 @ 04:29) (97% - 100%)    PHYSICAL EXAM:  GENERAL: AAOx2 to person and place, no acute distress.  HEAD:  Atraumatic, Normocephalic  EYES: conjunctiva and sclera clear  NECK: Supple, No thyromegaly   CHEST/LUNG: Clear to auscultation bilaterally; No wheeze, rhonchi, or rales  HEART: Regular rate and rhythm; normal S1, S2, No murmurs.  ABDOMEN: Soft, nontender, nondistended; Bowel sounds present  NEUROLOGY: No asterixis or tremor  SKIN: Intact, no jaundice  Rectal exam-        LABS:  01-03    139  |  105  |  58<H>  ----------------------------<  87  4.4   |  19  |  1.9<H>    Ca    7.9<L>      03 Jan 2024 04:30    TPro  6.0  /  Alb  3.8  /  TBili  2.3<H>  /  DBili  x   /  AST  85<H>  /  ALT  39  /  AlkPhos  102  01-02                          8.5    11.08 )-----------( 236      ( 03 Jan 2024 04:30 )             25.9     LIVER FUNCTIONS - ( 02 Jan 2024 17:00 )  Alb: 3.8 g/dL / Pro: 6.0 g/dL / ALK PHOS: 102 U/L / ALT: 39 U/L / AST: 85 U/L / GGT: x           PT/INR - ( 02 Jan 2024 17:00 )   PT: 11.70 sec;   INR: 1.03 ratio         PTT - ( 02 Jan 2024 17:00 )  PTT:26.8 sec    IMAGING:    < from: CT Abdomen and Pelvis w/ IV Cont (01.02.24 @ 17:37) >    ACC: [id number] EXAM:  CT ABDOMEN AND PELVIS IC   ORDERED BY: DAVID FERRER     ACC: [id number] EXAM:  CT CHEST IC   ORDERED BY: DAVID FERRER     PROCEDURE DATE:  01/02/2024          INTERPRETATION:  REASON FOR EXAM / CLINICAL STATEMENT:  Trauma Code.   Multiple falls. AMS. Confusion. WBC 15.82   PMHx of HTN, DL, BPH,   depressive psychosis (olanzapine)      TECHNIQUE:  Contiguous axial CT images were obtained from the thoracic   inlet through the pubic symphysis with IV contrast. IV contrast was   employed (Omnipaque 350); 95 ml was administered and 5 ml was discarded.   Reformatted images in the coronal and sagittal planes were acquired.      COMPARISON CT: None    OTHER STUDIES USED FOR CORRELATION: None.      FINDINGS / CHEST:    TUBESAND LINES: None.    HEART AND VESSELS: The heart is normal in size. There is no pericardial   effusion.    There is no evidence of central pulmonary embolism.    Normal caliber aorta and pulmonary artery. Aortic calcifications are   noted. There is no evidence of aortic aneurysm or dissection.    Brachiocephalic artery calcification, .    MEDIASTINUM: There are no suspicious mediastinal, hilar or axillary lymph   nodes. The visualized portion of the thyroid gland is unremarkable.    AIRWAYS, LUNGS AND PLEURA: The central tracheobronchial tree is patent.   There are mild dependent atelectatic changes at the lung bases.  There is   no pleural effusion. Bilateral apical pleural calcifications. There is no   pneumothorax.    BONES AND SOFT TISSUES: Degenerative changes of the spine.      FINDINGS / ABDOMEN AND PELVIS:      HEPATIC: The liver is normal in size with no evidence of solid mass or   bile duct dilatation. Hepatic steatosis is noted. The portal vein is   patent. The hepatic veins are opacified.    BILIARY: No calcified gallstones are noted.    SPLEEN: Unremarkable.    PANCREAS: The pancreas is normal in size and configuration. No evidence   of mass or pancreatitis.    ADRENAL GLANDS: Unremarkable.    KIDNEYS: There is symmetric renal enhancement. No evidence of   hydronephrosis, calcified stones, or solid mass.    ABDOMINOPELVIC NODES: Unremarkable.    PELVIC ORGANS: A right-sided hydrocele is noted in the right inguinal   canal. No evidence of pelvic mass, lymphadenopathy, or fluid collection.    BLADDER: Unremarkable.    PERITONEUM/MESENTERY/BOWEL: No evidence of bowel obstruction, colitis,   inflammatory process, or ascites. No pneumoperitoneum.    BONES/SOFT TISSUES: Degenerative changes of the spine are noted.   Compression fracture of the superior endplate of L1 of indeterminate age.    OTHER: Dense aortoiliac calcifications are noted with no evidence of   abdominal aortic aneurysm.      IMPRESSION:    No evidence of thoracic, abdominal or pelvic mass or inflammatory process.    No evidence of thoracic, abdominal or pelvic visceral injury, laceration,   or hematoma.    Compression fracture of the superior endplate of L1 of indeterminate age.    A right-sided hydrocele is noted in the right inguinal canal.    --- End of Report ---            SOO PEPE MD; Attending Interventional Radiologist  This document has been electronically signed. Jan 2 2024  7:07PM    < end of copied text >       Chief complaint/Reason for consult: anemia no gross GI bleeding    HPI:  pt is a 72yo M PMHx  of HTN, DL, BPH, depression presents to ER with daughter c/o confusion and frequent falls. As per daughter earlier today pt had turned the gas on the stove but was not cooking anything, the house smelled of gas and pt did not notice. Pt is poor historian AAO x 2, unable to give detailed medical history. Daughter notes pt has has multiple falls recently, no LOC, head trauma. Pt had syncopal event in November this year. Denies fever, chills, cp, sob, n/v/d, abdominal pain, dysuria.        (02 Jan 2024 20:26)    GI Updates: 73yMale pmh HTN, DL, BPH presents for confusion and falls. GI consulted for anemia no gross GI bleeding. Currently Patient denies nausea, vomiting, hematemesis, melena, blood in stool, diarrhea, constipation, abdominal pain.      PAST MEDICAL & SURGICAL HISTORY:   Hypertension      BPH (benign prostatic hyperplasia)      Skin cancer      Anxiety      Depression      Hypercholesteremia      Status post Mohs surgery      Inguinal hernia  left 2 years ago            Family history:  FAMILY HISTORY:    No GI cancers in first or second degree relatives    Social History: No smoking. No alcohol. No illegal drug use.    Allergies:   No Known Allergies  Intolerances    MEDICATIONS: Home Medications:  losartan 100 mg oral tablet: 1 tab(s) orally once a day (02 Jan 2024 20:04)  PARoxetine 40 mg oral tablet: 1 tab(s) orally once a day (02 Jan 2024 20:04)  pravastatin 20 mg oral tablet: 1 tab(s) orally once a day (02 Jan 2024 20:04)  ZyPREXA 5 mg oral tablet: 1 tab(s) orally once a day (02 Jan 2024 20:04)    MEDICATIONS  (STANDING):  amLODIPine   Tablet 2.5 milliGRAM(s) Oral daily  atorvastatin 10 milliGRAM(s) Oral at bedtime  ferrous    sulfate 325 milliGRAM(s) Oral daily  OLANZapine 2.5 milliGRAM(s) Oral at bedtime  pantoprazole    Tablet 40 milliGRAM(s) Oral before breakfast  PARoxetine 40 milliGRAM(s) Oral daily  sodium chloride 0.9%. 1000 milliLiter(s) (75 mL/Hr) IV Continuous <Continuous>  tamsulosin 0.4 milliGRAM(s) Oral at bedtime    MEDICATIONS  (PRN):  acetaminophen     Tablet .. 650 milliGRAM(s) Oral every 6 hours PRN Temp greater or equal to 38C (100.4F), Mild Pain (1 - 3)        REVIEW OF SYSTEMS  General:  No weight loss, fevers, or chills.  Eyes:  No reported pain or visual changes  ENT:  No sore throat or runny nose.  NECK: No stiffness or lymphadenopathy  CV:  No chest pain or palpitations.  Resp:  No shortness of breath, cough, wheezing or hemoptysis  GI:  No abdominal pain, nausea, vomiting, dysphagia, diarrhea or constipation. No rectal bleeding, melena, or hematemesis.  Muscle:  No aches or weakness  Neuro:  No tingling, numbness       VITALS:   T(F): 97.2 (01-03-24 @ 04:29), Max: 97.6 (01-02-24 @ 15:12)  HR: 62 (01-03-24 @ 04:29) (62 - 71)  BP: 182/72 (01-03-24 @ 04:29) (132/51 - 182/72)  RR: 18 (01-03-24 @ 04:29) (18 - 18)  SpO2: 97% (01-03-24 @ 04:29) (97% - 100%)    PHYSICAL EXAM:  GENERAL: AAOx2 to person and place, no acute distress.  HEAD:  Atraumatic, Normocephalic  EYES: conjunctiva and sclera clear  NECK: Supple, No thyromegaly   CHEST/LUNG: Clear to auscultation bilaterally; No wheeze, rhonchi, or rales  HEART: Regular rate and rhythm; normal S1, S2, No murmurs.  ABDOMEN: Soft, nontender, nondistended; Bowel sounds present  NEUROLOGY: No asterixis or tremor  SKIN: Intact, no jaundice  Rectal exam-        LABS:  01-03    139  |  105  |  58<H>  ----------------------------<  87  4.4   |  19  |  1.9<H>    Ca    7.9<L>      03 Jan 2024 04:30    TPro  6.0  /  Alb  3.8  /  TBili  2.3<H>  /  DBili  x   /  AST  85<H>  /  ALT  39  /  AlkPhos  102  01-02                          8.5    11.08 )-----------( 236      ( 03 Jan 2024 04:30 )             25.9     LIVER FUNCTIONS - ( 02 Jan 2024 17:00 )  Alb: 3.8 g/dL / Pro: 6.0 g/dL / ALK PHOS: 102 U/L / ALT: 39 U/L / AST: 85 U/L / GGT: x           PT/INR - ( 02 Jan 2024 17:00 )   PT: 11.70 sec;   INR: 1.03 ratio         PTT - ( 02 Jan 2024 17:00 )  PTT:26.8 sec    IMAGING:    < from: CT Abdomen and Pelvis w/ IV Cont (01.02.24 @ 17:37) >    ACC: [id number] EXAM:  CT ABDOMEN AND PELVIS IC   ORDERED BY: DAVID FERRER     ACC: [id number] EXAM:  CT CHEST IC   ORDERED BY: DAVID FERRER     PROCEDURE DATE:  01/02/2024          INTERPRETATION:  REASON FOR EXAM / CLINICAL STATEMENT:  Trauma Code.   Multiple falls. AMS. Confusion. WBC 15.82   PMHx of HTN, DL, BPH,   depressive psychosis (olanzapine)      TECHNIQUE:  Contiguous axial CT images were obtained from the thoracic   inlet through the pubic symphysis with IV contrast. IV contrast was   employed (Omnipaque 350); 95 ml was administered and 5 ml was discarded.   Reformatted images in the coronal and sagittal planes were acquired.      COMPARISON CT: None    OTHER STUDIES USED FOR CORRELATION: None.      FINDINGS / CHEST:    TUBESAND LINES: None.    HEART AND VESSELS: The heart is normal in size. There is no pericardial   effusion.    There is no evidence of central pulmonary embolism.    Normal caliber aorta and pulmonary artery. Aortic calcifications are   noted. There is no evidence of aortic aneurysm or dissection.    Brachiocephalic artery calcification, .    MEDIASTINUM: There are no suspicious mediastinal, hilar or axillary lymph   nodes. The visualized portion of the thyroid gland is unremarkable.    AIRWAYS, LUNGS AND PLEURA: The central tracheobronchial tree is patent.   There are mild dependent atelectatic changes at the lung bases.  There is   no pleural effusion. Bilateral apical pleural calcifications. There is no   pneumothorax.    BONES AND SOFT TISSUES: Degenerative changes of the spine.      FINDINGS / ABDOMEN AND PELVIS:      HEPATIC: The liver is normal in size with no evidence of solid mass or   bile duct dilatation. Hepatic steatosis is noted. The portal vein is   patent. The hepatic veins are opacified.    BILIARY: No calcified gallstones are noted.    SPLEEN: Unremarkable.    PANCREAS: The pancreas is normal in size and configuration. No evidence   of mass or pancreatitis.    ADRENAL GLANDS: Unremarkable.    KIDNEYS: There is symmetric renal enhancement. No evidence of   hydronephrosis, calcified stones, or solid mass.    ABDOMINOPELVIC NODES: Unremarkable.    PELVIC ORGANS: A right-sided hydrocele is noted in the right inguinal   canal. No evidence of pelvic mass, lymphadenopathy, or fluid collection.    BLADDER: Unremarkable.    PERITONEUM/MESENTERY/BOWEL: No evidence of bowel obstruction, colitis,   inflammatory process, or ascites. No pneumoperitoneum.    BONES/SOFT TISSUES: Degenerative changes of the spine are noted.   Compression fracture of the superior endplate of L1 of indeterminate age.    OTHER: Dense aortoiliac calcifications are noted with no evidence of   abdominal aortic aneurysm.      IMPRESSION:    No evidence of thoracic, abdominal or pelvic mass or inflammatory process.    No evidence of thoracic, abdominal or pelvic visceral injury, laceration,   or hematoma.    Compression fracture of the superior endplate of L1 of indeterminate age.    A right-sided hydrocele is noted in the right inguinal canal.    --- End of Report ---            SOO PEPE MD; Attending Interventional Radiologist  This document has been electronically signed. Jan 2 2024  7:07PM    < end of copied text >       Chief complaint/Reason for consult: anemia no gross GI bleeding    HPI:  pt is a 74yo M PMHx  of HTN, DL, BPH, depression presents to ER with daughter c/o confusion and frequent falls. As per daughter earlier today pt had turned the gas on the stove but was not cooking anything, the house smelled of gas and pt did not notice. Pt is poor historian AAO x 2, unable to give detailed medical history. Daughter notes pt has has multiple falls recently, no LOC, head trauma. Pt had syncopal event in November this year. Denies fever, chills, cp, sob, n/v/d, abdominal pain, dysuria.        (02 Jan 2024 20:26)    GI Updates: 73yMale pmh HTN, DL, BPH presents for confusion and falls. GI consulted for anemia no gross GI bleeding. Currently Patient denies nausea, vomiting, hematemesis, melena, blood in stool, diarrhea, constipation, abdominal pain.      PAST MEDICAL & SURGICAL HISTORY:   Hypertension      BPH (benign prostatic hyperplasia)      Skin cancer      Anxiety      Depression      Hypercholesteremia      Status post Mohs surgery      Inguinal hernia  left 2 years ago            Family history:  FAMILY HISTORY:    No GI cancers in first or second degree relatives    Social History: No smoking. No alcohol. No illegal drug use.    Allergies:   No Known Allergies  Intolerances    MEDICATIONS: Home Medications:  losartan 100 mg oral tablet: 1 tab(s) orally once a day (02 Jan 2024 20:04)  PARoxetine 40 mg oral tablet: 1 tab(s) orally once a day (02 Jan 2024 20:04)  pravastatin 20 mg oral tablet: 1 tab(s) orally once a day (02 Jan 2024 20:04)  ZyPREXA 5 mg oral tablet: 1 tab(s) orally once a day (02 Jan 2024 20:04)    MEDICATIONS  (STANDING):  amLODIPine   Tablet 2.5 milliGRAM(s) Oral daily  atorvastatin 10 milliGRAM(s) Oral at bedtime  ferrous    sulfate 325 milliGRAM(s) Oral daily  OLANZapine 2.5 milliGRAM(s) Oral at bedtime  pantoprazole    Tablet 40 milliGRAM(s) Oral before breakfast  PARoxetine 40 milliGRAM(s) Oral daily  sodium chloride 0.9%. 1000 milliLiter(s) (75 mL/Hr) IV Continuous <Continuous>  tamsulosin 0.4 milliGRAM(s) Oral at bedtime    MEDICATIONS  (PRN):  acetaminophen     Tablet .. 650 milliGRAM(s) Oral every 6 hours PRN Temp greater or equal to 38C (100.4F), Mild Pain (1 - 3)        REVIEW OF SYSTEMS  General:  No weight loss, fevers, or chills.  Eyes:  No reported pain or visual changes  ENT:  No sore throat or runny nose.  NECK: No stiffness or lymphadenopathy  CV:  No chest pain or palpitations.  Resp:  No shortness of breath, cough, wheezing or hemoptysis  GI:  No abdominal pain, nausea, vomiting, dysphagia, diarrhea or constipation. No rectal bleeding, melena, or hematemesis.  Muscle:  No aches or weakness  Neuro:  No tingling, numbness       VITALS:   T(F): 97.2 (01-03-24 @ 04:29), Max: 97.6 (01-02-24 @ 15:12)  HR: 62 (01-03-24 @ 04:29) (62 - 71)  BP: 182/72 (01-03-24 @ 04:29) (132/51 - 182/72)  RR: 18 (01-03-24 @ 04:29) (18 - 18)  SpO2: 97% (01-03-24 @ 04:29) (97% - 100%)    PHYSICAL EXAM:  GENERAL: AAOx2 to person and place not time, no acute distress.  HEAD:  Atraumatic, Normocephalic  EYES: conjunctiva and sclera clear  NECK: Supple, No thyromegaly   CHEST/LUNG: Clear to auscultation bilaterally; No wheeze, rhonchi, or rales  HEART: Regular rate and rhythm; normal S1, S2, No murmurs.  ABDOMEN: Soft, nontender, nondistended; Bowel sounds present  NEUROLOGY: No asterixis or tremor  SKIN: Intact, no jaundice  Rectal exam-brown stool in rectal vault and on finger        LABS:  01-03    139  |  105  |  58<H>  ----------------------------<  87  4.4   |  19  |  1.9<H>    Ca    7.9<L>      03 Jan 2024 04:30    TPro  6.0  /  Alb  3.8  /  TBili  2.3<H>  /  DBili  x   /  AST  85<H>  /  ALT  39  /  AlkPhos  102  01-02                          8.5    11.08 )-----------( 236      ( 03 Jan 2024 04:30 )             25.9     LIVER FUNCTIONS - ( 02 Jan 2024 17:00 )  Alb: 3.8 g/dL / Pro: 6.0 g/dL / ALK PHOS: 102 U/L / ALT: 39 U/L / AST: 85 U/L / GGT: x           PT/INR - ( 02 Jan 2024 17:00 )   PT: 11.70 sec;   INR: 1.03 ratio         PTT - ( 02 Jan 2024 17:00 )  PTT:26.8 sec    IMAGING:    < from: CT Abdomen and Pelvis w/ IV Cont (01.02.24 @ 17:37) >    ACC: [id number] EXAM:  CT ABDOMEN AND PELVIS IC   ORDERED BY: DAVID FERRER     ACC: [id number] EXAM:  CT CHEST IC   ORDERED BY: DAVID FERRER     PROCEDURE DATE:  01/02/2024          INTERPRETATION:  REASON FOR EXAM / CLINICAL STATEMENT:  Trauma Code.   Multiple falls. AMS. Confusion. WBC 15.82   PMHx of HTN, DL, BPH,   depressive psychosis (olanzapine)      TECHNIQUE:  Contiguous axial CT images were obtained from the thoracic   inlet through the pubic symphysis with IV contrast. IV contrast was   employed (Omnipaque 350); 95 ml was administered and 5 ml was discarded.   Reformatted images in the coronal and sagittal planes were acquired.      COMPARISON CT: None    OTHER STUDIES USED FOR CORRELATION: None.      FINDINGS / CHEST:    TUBESAND LINES: None.    HEART AND VESSELS: The heart is normal in size. There is no pericardial   effusion.    There is no evidence of central pulmonary embolism.    Normal caliber aorta and pulmonary artery. Aortic calcifications are   noted. There is no evidence of aortic aneurysm or dissection.    Brachiocephalic artery calcification, .    MEDIASTINUM: There are no suspicious mediastinal, hilar or axillary lymph   nodes. The visualized portion of the thyroid gland is unremarkable.    AIRWAYS, LUNGS AND PLEURA: The central tracheobronchial tree is patent.   There are mild dependent atelectatic changes at the lung bases.  There is   no pleural effusion. Bilateral apical pleural calcifications. There is no   pneumothorax.    BONES AND SOFT TISSUES: Degenerative changes of the spine.      FINDINGS / ABDOMEN AND PELVIS:      HEPATIC: The liver is normal in size with no evidence of solid mass or   bile duct dilatation. Hepatic steatosis is noted. The portal vein is   patent. The hepatic veins are opacified.    BILIARY: No calcified gallstones are noted.    SPLEEN: Unremarkable.    PANCREAS: The pancreas is normal in size and configuration. No evidence   of mass or pancreatitis.    ADRENAL GLANDS: Unremarkable.    KIDNEYS: There is symmetric renal enhancement. No evidence of   hydronephrosis, calcified stones, or solid mass.    ABDOMINOPELVIC NODES: Unremarkable.    PELVIC ORGANS: A right-sided hydrocele is noted in the right inguinal   canal. No evidence of pelvic mass, lymphadenopathy, or fluid collection.    BLADDER: Unremarkable.    PERITONEUM/MESENTERY/BOWEL: No evidence of bowel obstruction, colitis,   inflammatory process, or ascites. No pneumoperitoneum.    BONES/SOFT TISSUES: Degenerative changes of the spine are noted.   Compression fracture of the superior endplate of L1 of indeterminate age.    OTHER: Dense aortoiliac calcifications are noted with no evidence of   abdominal aortic aneurysm.      IMPRESSION:    No evidence of thoracic, abdominal or pelvic mass or inflammatory process.    No evidence of thoracic, abdominal or pelvic visceral injury, laceration,   or hematoma.    Compression fracture of the superior endplate of L1 of indeterminate age.    A right-sided hydrocele is noted in the right inguinal canal.    --- End of Report ---            SOO PEPE MD; Attending Interventional Radiologist  This document has been electronically signed. Jan 2 2024  7:07PM    < end of copied text >       Chief complaint/Reason for consult: anemia no gross GI bleeding    HPI:  pt is a 72yo M PMHx  of HTN, DL, BPH, depression presents to ER with daughter c/o confusion and frequent falls. As per daughter earlier today pt had turned the gas on the stove but was not cooking anything, the house smelled of gas and pt did not notice. Pt is poor historian AAO x 2, unable to give detailed medical history. Daughter notes pt has has multiple falls recently, no LOC, head trauma. Pt had syncopal event in November this year. Denies fever, chills, cp, sob, n/v/d, abdominal pain, dysuria.        (02 Jan 2024 20:26)    GI Updates: 73yMale pmh HTN, DL, BPH presents for confusion and falls. GI consulted for anemia no gross GI bleeding. Currently Patient denies nausea, vomiting, hematemesis, melena, blood in stool, diarrhea, constipation, abdominal pain.      PAST MEDICAL & SURGICAL HISTORY:   Hypertension      BPH (benign prostatic hyperplasia)      Skin cancer      Anxiety      Depression      Hypercholesteremia      Status post Mohs surgery      Inguinal hernia  left 2 years ago            Family history:  FAMILY HISTORY:    No GI cancers in first or second degree relatives    Social History: No smoking. No alcohol. No illegal drug use.    Allergies:   No Known Allergies  Intolerances    MEDICATIONS: Home Medications:  losartan 100 mg oral tablet: 1 tab(s) orally once a day (02 Jan 2024 20:04)  PARoxetine 40 mg oral tablet: 1 tab(s) orally once a day (02 Jan 2024 20:04)  pravastatin 20 mg oral tablet: 1 tab(s) orally once a day (02 Jan 2024 20:04)  ZyPREXA 5 mg oral tablet: 1 tab(s) orally once a day (02 Jan 2024 20:04)    MEDICATIONS  (STANDING):  amLODIPine   Tablet 2.5 milliGRAM(s) Oral daily  atorvastatin 10 milliGRAM(s) Oral at bedtime  ferrous    sulfate 325 milliGRAM(s) Oral daily  OLANZapine 2.5 milliGRAM(s) Oral at bedtime  pantoprazole    Tablet 40 milliGRAM(s) Oral before breakfast  PARoxetine 40 milliGRAM(s) Oral daily  sodium chloride 0.9%. 1000 milliLiter(s) (75 mL/Hr) IV Continuous <Continuous>  tamsulosin 0.4 milliGRAM(s) Oral at bedtime    MEDICATIONS  (PRN):  acetaminophen     Tablet .. 650 milliGRAM(s) Oral every 6 hours PRN Temp greater or equal to 38C (100.4F), Mild Pain (1 - 3)        REVIEW OF SYSTEMS  General:  No weight loss, fevers, or chills.  Eyes:  No reported pain or visual changes  ENT:  No sore throat or runny nose.  NECK: No stiffness or lymphadenopathy  CV:  No chest pain or palpitations.  Resp:  No shortness of breath, cough, wheezing or hemoptysis  GI:  No abdominal pain, nausea, vomiting, dysphagia, diarrhea or constipation. No rectal bleeding, melena, or hematemesis.  Muscle:  No aches or weakness  Neuro:  No tingling, numbness       VITALS:   T(F): 97.2 (01-03-24 @ 04:29), Max: 97.6 (01-02-24 @ 15:12)  HR: 62 (01-03-24 @ 04:29) (62 - 71)  BP: 182/72 (01-03-24 @ 04:29) (132/51 - 182/72)  RR: 18 (01-03-24 @ 04:29) (18 - 18)  SpO2: 97% (01-03-24 @ 04:29) (97% - 100%)    PHYSICAL EXAM:  GENERAL: AAOx2 to person and place not time, no acute distress.  HEAD:  Atraumatic, Normocephalic  EYES: conjunctiva and sclera clear  NECK: Supple, No thyromegaly   CHEST/LUNG: Clear to auscultation bilaterally; No wheeze, rhonchi, or rales  HEART: Regular rate and rhythm; normal S1, S2, No murmurs.  ABDOMEN: Soft, nontender, nondistended; Bowel sounds present  NEUROLOGY: No asterixis or tremor  SKIN: Intact, no jaundice  Rectal exam-brown stool in rectal vault and on finger        LABS:  01-03    139  |  105  |  58<H>  ----------------------------<  87  4.4   |  19  |  1.9<H>    Ca    7.9<L>      03 Jan 2024 04:30    TPro  6.0  /  Alb  3.8  /  TBili  2.3<H>  /  DBili  x   /  AST  85<H>  /  ALT  39  /  AlkPhos  102  01-02                          8.5    11.08 )-----------( 236      ( 03 Jan 2024 04:30 )             25.9     LIVER FUNCTIONS - ( 02 Jan 2024 17:00 )  Alb: 3.8 g/dL / Pro: 6.0 g/dL / ALK PHOS: 102 U/L / ALT: 39 U/L / AST: 85 U/L / GGT: x           PT/INR - ( 02 Jan 2024 17:00 )   PT: 11.70 sec;   INR: 1.03 ratio         PTT - ( 02 Jan 2024 17:00 )  PTT:26.8 sec    IMAGING:    < from: CT Abdomen and Pelvis w/ IV Cont (01.02.24 @ 17:37) >    ACC: [id number] EXAM:  CT ABDOMEN AND PELVIS IC   ORDERED BY: DAVID FERRER     ACC: [id number] EXAM:  CT CHEST IC   ORDERED BY: DAVID FERRER     PROCEDURE DATE:  01/02/2024          INTERPRETATION:  REASON FOR EXAM / CLINICAL STATEMENT:  Trauma Code.   Multiple falls. AMS. Confusion. WBC 15.82   PMHx of HTN, DL, BPH,   depressive psychosis (olanzapine)      TECHNIQUE:  Contiguous axial CT images were obtained from the thoracic   inlet through the pubic symphysis with IV contrast. IV contrast was   employed (Omnipaque 350); 95 ml was administered and 5 ml was discarded.   Reformatted images in the coronal and sagittal planes were acquired.      COMPARISON CT: None    OTHER STUDIES USED FOR CORRELATION: None.      FINDINGS / CHEST:    TUBESAND LINES: None.    HEART AND VESSELS: The heart is normal in size. There is no pericardial   effusion.    There is no evidence of central pulmonary embolism.    Normal caliber aorta and pulmonary artery. Aortic calcifications are   noted. There is no evidence of aortic aneurysm or dissection.    Brachiocephalic artery calcification, .    MEDIASTINUM: There are no suspicious mediastinal, hilar or axillary lymph   nodes. The visualized portion of the thyroid gland is unremarkable.    AIRWAYS, LUNGS AND PLEURA: The central tracheobronchial tree is patent.   There are mild dependent atelectatic changes at the lung bases.  There is   no pleural effusion. Bilateral apical pleural calcifications. There is no   pneumothorax.    BONES AND SOFT TISSUES: Degenerative changes of the spine.      FINDINGS / ABDOMEN AND PELVIS:      HEPATIC: The liver is normal in size with no evidence of solid mass or   bile duct dilatation. Hepatic steatosis is noted. The portal vein is   patent. The hepatic veins are opacified.    BILIARY: No calcified gallstones are noted.    SPLEEN: Unremarkable.    PANCREAS: The pancreas is normal in size and configuration. No evidence   of mass or pancreatitis.    ADRENAL GLANDS: Unremarkable.    KIDNEYS: There is symmetric renal enhancement. No evidence of   hydronephrosis, calcified stones, or solid mass.    ABDOMINOPELVIC NODES: Unremarkable.    PELVIC ORGANS: A right-sided hydrocele is noted in the right inguinal   canal. No evidence of pelvic mass, lymphadenopathy, or fluid collection.    BLADDER: Unremarkable.    PERITONEUM/MESENTERY/BOWEL: No evidence of bowel obstruction, colitis,   inflammatory process, or ascites. No pneumoperitoneum.    BONES/SOFT TISSUES: Degenerative changes of the spine are noted.   Compression fracture of the superior endplate of L1 of indeterminate age.    OTHER: Dense aortoiliac calcifications are noted with no evidence of   abdominal aortic aneurysm.      IMPRESSION:    No evidence of thoracic, abdominal or pelvic mass or inflammatory process.    No evidence of thoracic, abdominal or pelvic visceral injury, laceration,   or hematoma.    Compression fracture of the superior endplate of L1 of indeterminate age.    A right-sided hydrocele is noted in the right inguinal canal.    --- End of Report ---            SOO PEPE MD; Attending Interventional Radiologist  This document has been electronically signed. Jan 2 2024  7:07PM    < end of copied text >       Chief complaint/Reason for consult: anemia no gross GI bleeding    HPI:  pt is a 72yo M PMHx  of HTN, DL, BPH, depression presents to ER with daughter c/o confusion and frequent falls. As per daughter earlier today pt had turned the gas on the stove but was not cooking anything, the house smelled of gas and pt did not notice. Pt is poor historian AAO x 2, unable to give detailed medical history. Daughter notes pt has has multiple falls recently, no LOC, head trauma. Pt had syncopal event in November this year. Denies fever, chills, cp, sob, n/v/d, abdominal pain, dysuria.        (02 Jan 2024 20:26)    GI Updates: 73yMale pmh HTN, DL, BPH presents for confusion and falls. GI consulted for anemia no gross GI bleeding. Currently Patient denies nausea, vomiting, hematemesis, melena, blood in stool, diarrhea, constipation, abdominal pain.      PAST MEDICAL & SURGICAL HISTORY:   Hypertension      BPH (benign prostatic hyperplasia)      Skin cancer      Anxiety      Depression      Hypercholesteremia      Status post Mohs surgery      Inguinal hernia  left 2 years ago            Family history:  FAMILY HISTORY:    No GI cancers in first or second degree relatives    Social History: No smoking. No alcohol. No illegal drug use.    Allergies:   No Known Allergies  Intolerances    MEDICATIONS: Home Medications:  losartan 100 mg oral tablet: 1 tab(s) orally once a day (02 Jan 2024 20:04)  PARoxetine 40 mg oral tablet: 1 tab(s) orally once a day (02 Jan 2024 20:04)  pravastatin 20 mg oral tablet: 1 tab(s) orally once a day (02 Jan 2024 20:04)  ZyPREXA 5 mg oral tablet: 1 tab(s) orally once a day (02 Jan 2024 20:04)    MEDICATIONS  (STANDING):  amLODIPine   Tablet 2.5 milliGRAM(s) Oral daily  atorvastatin 10 milliGRAM(s) Oral at bedtime  ferrous    sulfate 325 milliGRAM(s) Oral daily  OLANZapine 2.5 milliGRAM(s) Oral at bedtime  pantoprazole    Tablet 40 milliGRAM(s) Oral before breakfast  PARoxetine 40 milliGRAM(s) Oral daily  sodium chloride 0.9%. 1000 milliLiter(s) (75 mL/Hr) IV Continuous <Continuous>  tamsulosin 0.4 milliGRAM(s) Oral at bedtime    MEDICATIONS  (PRN):  acetaminophen     Tablet .. 650 milliGRAM(s) Oral every 6 hours PRN Temp greater or equal to 38C (100.4F), Mild Pain (1 - 3)        REVIEW OF SYSTEMS  General:  No weight loss, fevers, or chills.  Eyes:  No reported pain or visual changes  ENT:  No sore throat or runny nose.  NECK: No stiffness or lymphadenopathy  CV:  No chest pain or palpitations.  Resp:  No shortness of breath, cough, wheezing or hemoptysis  GI:  No abdominal pain, nausea, vomiting, dysphagia, diarrhea or constipation. No rectal bleeding, melena, or hematemesis.  Neuro:  No tingling, numbness       VITALS:   T(F): 97.2 (01-03-24 @ 04:29), Max: 97.6 (01-02-24 @ 15:12)  HR: 62 (01-03-24 @ 04:29) (62 - 71)  BP: 182/72 (01-03-24 @ 04:29) (132/51 - 182/72)  RR: 18 (01-03-24 @ 04:29) (18 - 18)  SpO2: 97% (01-03-24 @ 04:29) (97% - 100%)    PHYSICAL EXAM:  GENERAL: AAOx2 to person and place not time, no acute distress.  HEAD:  Atraumatic, Normocephalic  EYES: conjunctiva and sclera clear  NECK: Supple, No thyromegaly   CHEST/LUNG: Clear to auscultation bilaterally; No wheeze, rhonchi, or rales  HEART: Regular rate and rhythm; normal S1, S2, No murmurs.  ABDOMEN: Soft, nontender, nondistended; Bowel sounds present  NEUROLOGY: No asterixis or tremor  SKIN: Intact, no jaundice  Rectal exam-brown stool in rectal vault and on finger, patient with ecchymosis on back, b/l on gluteal area and perianal areas        LABS:  01-03    139  |  105  |  58<H>  ----------------------------<  87  4.4   |  19  |  1.9<H>    Ca    7.9<L>      03 Jan 2024 04:30    TPro  6.0  /  Alb  3.8  /  TBili  2.3<H>  /  DBili  x   /  AST  85<H>  /  ALT  39  /  AlkPhos  102  01-02                          8.5    11.08 )-----------( 236      ( 03 Jan 2024 04:30 )             25.9     LIVER FUNCTIONS - ( 02 Jan 2024 17:00 )  Alb: 3.8 g/dL / Pro: 6.0 g/dL / ALK PHOS: 102 U/L / ALT: 39 U/L / AST: 85 U/L / GGT: x           PT/INR - ( 02 Jan 2024 17:00 )   PT: 11.70 sec;   INR: 1.03 ratio         PTT - ( 02 Jan 2024 17:00 )  PTT:26.8 sec    IMAGING:    < from: CT Abdomen and Pelvis w/ IV Cont (01.02.24 @ 17:37) >    ACC: [id number] EXAM:  CT ABDOMEN AND PELVIS IC   ORDERED BY: DAVID FERRER     ACC: [id number] EXAM:  CT CHEST IC   ORDERED BY: DAVID FERRER     PROCEDURE DATE:  01/02/2024          INTERPRETATION:  REASON FOR EXAM / CLINICAL STATEMENT:  Trauma Code.   Multiple falls. AMS. Confusion. WBC 15.82   PMHx of HTN, DL, BPH,   depressive psychosis (olanzapine)      TECHNIQUE:  Contiguous axial CT images were obtained from the thoracic   inlet through the pubic symphysis with IV contrast. IV contrast was   employed (Omnipaque 350); 95 ml was administered and 5 ml was discarded.   Reformatted images in the coronal and sagittal planes were acquired.      COMPARISON CT: None    OTHER STUDIES USED FOR CORRELATION: None.      FINDINGS / CHEST:    TUBESAND LINES: None.    HEART AND VESSELS: The heart is normal in size. There is no pericardial   effusion.    There is no evidence of central pulmonary embolism.    Normal caliber aorta and pulmonary artery. Aortic calcifications are   noted. There is no evidence of aortic aneurysm or dissection.    Brachiocephalic artery calcification, .    MEDIASTINUM: There are no suspicious mediastinal, hilar or axillary lymph   nodes. The visualized portion of the thyroid gland is unremarkable.    AIRWAYS, LUNGS AND PLEURA: The central tracheobronchial tree is patent.   There are mild dependent atelectatic changes at the lung bases.  There is   no pleural effusion. Bilateral apical pleural calcifications. There is no   pneumothorax.    BONES AND SOFT TISSUES: Degenerative changes of the spine.      FINDINGS / ABDOMEN AND PELVIS:      HEPATIC: The liver is normal in size with no evidence of solid mass or   bile duct dilatation. Hepatic steatosis is noted. The portal vein is   patent. The hepatic veins are opacified.    BILIARY: No calcified gallstones are noted.    SPLEEN: Unremarkable.    PANCREAS: The pancreas is normal in size and configuration. No evidence   of mass or pancreatitis.    ADRENAL GLANDS: Unremarkable.    KIDNEYS: There is symmetric renal enhancement. No evidence of   hydronephrosis, calcified stones, or solid mass.    ABDOMINOPELVIC NODES: Unremarkable.    PELVIC ORGANS: A right-sided hydrocele is noted in the right inguinal   canal. No evidence of pelvic mass, lymphadenopathy, or fluid collection.    BLADDER: Unremarkable.    PERITONEUM/MESENTERY/BOWEL: No evidence of bowel obstruction, colitis,   inflammatory process, or ascites. No pneumoperitoneum.    BONES/SOFT TISSUES: Degenerative changes of the spine are noted.   Compression fracture of the superior endplate of L1 of indeterminate age.    OTHER: Dense aortoiliac calcifications are noted with no evidence of   abdominal aortic aneurysm.      IMPRESSION:    No evidence of thoracic, abdominal or pelvic mass or inflammatory process.    No evidence of thoracic, abdominal or pelvic visceral injury, laceration,   or hematoma.    Compression fracture of the superior endplate of L1 of indeterminate age.    A right-sided hydrocele is noted in the right inguinal canal.    --- End of Report ---            SOO PEPE MD; Attending Interventional Radiologist  This document has been electronically signed. Jan 2 2024  7:07PM    < end of copied text >       Chief complaint/Reason for consult: anemia no gross GI bleeding    HPI:  pt is a 72yo M PMHx  of HTN, DL, BPH, depression presents to ER with daughter c/o confusion and frequent falls. As per daughter earlier today pt had turned the gas on the stove but was not cooking anything, the house smelled of gas and pt did not notice. Pt is poor historian AAO x 2, unable to give detailed medical history. Daughter notes pt has has multiple falls recently, no LOC, head trauma. Pt had syncopal event in November this year. Denies fever, chills, cp, sob, n/v/d, abdominal pain, dysuria.        (02 Jan 2024 20:26)    GI Updates: 73yMale pmh HTN, DL, BPH presents for confusion and falls. GI consulted for anemia no gross GI bleeding. Currently Patient denies nausea, vomiting, hematemesis, melena, blood in stool, diarrhea, constipation, abdominal pain.      PAST MEDICAL & SURGICAL HISTORY:   Hypertension      BPH (benign prostatic hyperplasia)      Skin cancer      Anxiety      Depression      Hypercholesteremia      Status post Mohs surgery      Inguinal hernia  left 2 years ago            Family history:  FAMILY HISTORY:    No GI cancers in first or second degree relatives    Social History: No smoking. No alcohol. No illegal drug use.    Allergies:   No Known Allergies  Intolerances    MEDICATIONS: Home Medications:  losartan 100 mg oral tablet: 1 tab(s) orally once a day (02 Jan 2024 20:04)  PARoxetine 40 mg oral tablet: 1 tab(s) orally once a day (02 Jan 2024 20:04)  pravastatin 20 mg oral tablet: 1 tab(s) orally once a day (02 Jan 2024 20:04)  ZyPREXA 5 mg oral tablet: 1 tab(s) orally once a day (02 Jan 2024 20:04)    MEDICATIONS  (STANDING):  amLODIPine   Tablet 2.5 milliGRAM(s) Oral daily  atorvastatin 10 milliGRAM(s) Oral at bedtime  ferrous    sulfate 325 milliGRAM(s) Oral daily  OLANZapine 2.5 milliGRAM(s) Oral at bedtime  pantoprazole    Tablet 40 milliGRAM(s) Oral before breakfast  PARoxetine 40 milliGRAM(s) Oral daily  sodium chloride 0.9%. 1000 milliLiter(s) (75 mL/Hr) IV Continuous <Continuous>  tamsulosin 0.4 milliGRAM(s) Oral at bedtime    MEDICATIONS  (PRN):  acetaminophen     Tablet .. 650 milliGRAM(s) Oral every 6 hours PRN Temp greater or equal to 38C (100.4F), Mild Pain (1 - 3)        REVIEW OF SYSTEMS  General:  No weight loss, fevers, or chills.  Eyes:  No reported pain or visual changes  ENT:  No sore throat or runny nose.  NECK: No stiffness or lymphadenopathy  CV:  No chest pain or palpitations.  Resp:  No shortness of breath, cough, wheezing or hemoptysis  GI:  No abdominal pain, nausea, vomiting, dysphagia, diarrhea or constipation. No rectal bleeding, melena, or hematemesis.  Neuro:  No tingling, numbness       VITALS:   T(F): 97.2 (01-03-24 @ 04:29), Max: 97.6 (01-02-24 @ 15:12)  HR: 62 (01-03-24 @ 04:29) (62 - 71)  BP: 182/72 (01-03-24 @ 04:29) (132/51 - 182/72)  RR: 18 (01-03-24 @ 04:29) (18 - 18)  SpO2: 97% (01-03-24 @ 04:29) (97% - 100%)    PHYSICAL EXAM:  GENERAL: AAOx2 to person and place not time, no acute distress.  HEAD:  Atraumatic, Normocephalic  EYES: conjunctiva and sclera clear  NECK: Supple, No thyromegaly   CHEST/LUNG: Clear to auscultation bilaterally; No wheeze, rhonchi, or rales  HEART: Regular rate and rhythm; normal S1, S2, No murmurs.  ABDOMEN: Soft, nontender, nondistended; Bowel sounds present  NEUROLOGY: No asterixis or tremor  SKIN: Intact, no jaundice  Rectal exam-brown stool in rectal vault and on finger, patient with ecchymosis on back, b/l on gluteal area and perianal areas        LABS:  01-03    139  |  105  |  58<H>  ----------------------------<  87  4.4   |  19  |  1.9<H>    Ca    7.9<L>      03 Jan 2024 04:30    TPro  6.0  /  Alb  3.8  /  TBili  2.3<H>  /  DBili  x   /  AST  85<H>  /  ALT  39  /  AlkPhos  102  01-02                          8.5    11.08 )-----------( 236      ( 03 Jan 2024 04:30 )             25.9     LIVER FUNCTIONS - ( 02 Jan 2024 17:00 )  Alb: 3.8 g/dL / Pro: 6.0 g/dL / ALK PHOS: 102 U/L / ALT: 39 U/L / AST: 85 U/L / GGT: x           PT/INR - ( 02 Jan 2024 17:00 )   PT: 11.70 sec;   INR: 1.03 ratio         PTT - ( 02 Jan 2024 17:00 )  PTT:26.8 sec    IMAGING:    < from: CT Abdomen and Pelvis w/ IV Cont (01.02.24 @ 17:37) >    ACC: [id number] EXAM:  CT ABDOMEN AND PELVIS IC   ORDERED BY: DAVID FERRER     ACC: [id number] EXAM:  CT CHEST IC   ORDERED BY: DAVID FERRER     PROCEDURE DATE:  01/02/2024          INTERPRETATION:  REASON FOR EXAM / CLINICAL STATEMENT:  Trauma Code.   Multiple falls. AMS. Confusion. WBC 15.82   PMHx of HTN, DL, BPH,   depressive psychosis (olanzapine)      TECHNIQUE:  Contiguous axial CT images were obtained from the thoracic   inlet through the pubic symphysis with IV contrast. IV contrast was   employed (Omnipaque 350); 95 ml was administered and 5 ml was discarded.   Reformatted images in the coronal and sagittal planes were acquired.      COMPARISON CT: None    OTHER STUDIES USED FOR CORRELATION: None.      FINDINGS / CHEST:    TUBESAND LINES: None.    HEART AND VESSELS: The heart is normal in size. There is no pericardial   effusion.    There is no evidence of central pulmonary embolism.    Normal caliber aorta and pulmonary artery. Aortic calcifications are   noted. There is no evidence of aortic aneurysm or dissection.    Brachiocephalic artery calcification, .    MEDIASTINUM: There are no suspicious mediastinal, hilar or axillary lymph   nodes. The visualized portion of the thyroid gland is unremarkable.    AIRWAYS, LUNGS AND PLEURA: The central tracheobronchial tree is patent.   There are mild dependent atelectatic changes at the lung bases.  There is   no pleural effusion. Bilateral apical pleural calcifications. There is no   pneumothorax.    BONES AND SOFT TISSUES: Degenerative changes of the spine.      FINDINGS / ABDOMEN AND PELVIS:      HEPATIC: The liver is normal in size with no evidence of solid mass or   bile duct dilatation. Hepatic steatosis is noted. The portal vein is   patent. The hepatic veins are opacified.    BILIARY: No calcified gallstones are noted.    SPLEEN: Unremarkable.    PANCREAS: The pancreas is normal in size and configuration. No evidence   of mass or pancreatitis.    ADRENAL GLANDS: Unremarkable.    KIDNEYS: There is symmetric renal enhancement. No evidence of   hydronephrosis, calcified stones, or solid mass.    ABDOMINOPELVIC NODES: Unremarkable.    PELVIC ORGANS: A right-sided hydrocele is noted in the right inguinal   canal. No evidence of pelvic mass, lymphadenopathy, or fluid collection.    BLADDER: Unremarkable.    PERITONEUM/MESENTERY/BOWEL: No evidence of bowel obstruction, colitis,   inflammatory process, or ascites. No pneumoperitoneum.    BONES/SOFT TISSUES: Degenerative changes of the spine are noted.   Compression fracture of the superior endplate of L1 of indeterminate age.    OTHER: Dense aortoiliac calcifications are noted with no evidence of   abdominal aortic aneurysm.      IMPRESSION:    No evidence of thoracic, abdominal or pelvic mass or inflammatory process.    No evidence of thoracic, abdominal or pelvic visceral injury, laceration,   or hematoma.    Compression fracture of the superior endplate of L1 of indeterminate age.    A right-sided hydrocele is noted in the right inguinal canal.    --- End of Report ---            SOO PEPE MD; Attending Interventional Radiologist  This document has been electronically signed. Jan 2 2024  7:07PM    < end of copied text >

## 2024-01-03 NOTE — PHYSICAL THERAPY INITIAL EVALUATION ADULT - IMPAIRMENTS FOUND, PT EVAL
aerobic capacity/endurance/cognitive impairment/gait, locomotion, and balance/neuromotor development and sensory integration/poor safety awareness

## 2024-01-03 NOTE — OCCUPATIONAL THERAPY INITIAL EVALUATION ADULT - LIVES WITH, PROFILE
in a private house 4 steps to enter w/o handrails; 10 steps with 1 HR to the living area 4 steps with 1 handrail to the bedroom/bathroom (+) Bathtub/alone

## 2024-01-03 NOTE — PATIENT PROFILE ADULT - FALL HARM RISK - HARM RISK INTERVENTIONS
Assistance OOB with selected safe patient handling equipment/Communicate Risk of Fall with Harm to all staff/Discuss with provider need for PT consult/Monitor gait and stability/Provide patient with walking aids - walker, cane, crutches/Reinforce activity limits and safety measures with patient and family/Tailored Fall Risk Interventions/Visual Cue: Yellow wristband and red socks/Bed in lowest position, wheels locked, appropriate side rails in place/Call bell, personal items and telephone in reach/Instruct patient to call for assistance before getting out of bed or chair/Non-slip footwear when patient is out of bed/[place] to call system/Physically safe environment - no spills, clutter or unnecessary equipment/Purposeful Proactive Rounding/Room/bathroom lighting operational, light cord in reach Assistance OOB with selected safe patient handling equipment/Communicate Risk of Fall with Harm to all staff/Discuss with provider need for PT consult/Monitor gait and stability/Provide patient with walking aids - walker, cane, crutches/Reinforce activity limits and safety measures with patient and family/Tailored Fall Risk Interventions/Visual Cue: Yellow wristband and red socks/Bed in lowest position, wheels locked, appropriate side rails in place/Call bell, personal items and telephone in reach/Instruct patient to call for assistance before getting out of bed or chair/Non-slip footwear when patient is out of bed/[place] to call system/Physically safe environment - no spills, clutter or unnecessary equipment/Purposeful Proactive Rounding/Room/bathroom lighting operational, light cord in reach

## 2024-01-03 NOTE — CONSULT NOTE ADULT - ASSESSMENT
Assessment:  This is a 73y Male with h/o HTN, DL, BPH, depression presents to ER with daughter c/o confusion and frequent falls suspect TME secondary to underlying infection +/- ANCHO.  Has evidence of b/l BG calcinosis recommend check for underlying endocrinopathies.    Plan:   - Obtain B12, Ammonia level, and TSH  - check calcitonin, PTH, Vit D levels  - Fall precautions  - Medicine management of leukocytosis and elevated Anion Gap  - Nephrology management of NACHO  - GI and Medicine management of Anemia s/p transfusion  - Zyprexa management as per Psych  - Pt follows Dr Castillo Neurology outpatient.  - Follow Ck levels   - Contact neuro team for any change in condition.       01-03-24 @ 12:06       Assessment:  This is a 73y Male with h/o HTN, DL, BPH, depression presents to ER with daughter c/o confusion and frequent falls suspect TME secondary to underlying infection +/- NACHO.  Has evidence of b/l BG calcinosis recommend check for underlying endocrinopathies.    Plan:   - Obtain B12, Ammonia level, and TSH  - check calcitonin, PTH, Vit D levels  - Fall precautions  - Medicine management of leukocytosis and elevated Anion Gap  - Nephrology management of NACHO  - GI and Medicine management of Anemia s/p transfusion  - Zyprexa management as per Psych  - Pt follows Dr Castillo Neurology outpatient.  - Follow Ck levels   - Contact neuro team for any change in condition.       01-03-24 @ 12:06

## 2024-01-03 NOTE — OCCUPATIONAL THERAPY INITIAL EVALUATION ADULT - PERTINENT HX OF CURRENT PROBLEM, REHAB EVAL
pt is a 74yo M PMHx  of HTN, DL, BPH, depression presents to ER with daughter c/o confusion and frequent falls. As per daughter earlier today (1/2/24) pt had turned the gas on the stove but was not cooking anything, the house smelled of gas and pt did not notice. Pt is poor historian AAO x 2, unable to give detailed medical history. Daughter notes pt has has multiple falls recently, no LOC, head trauma. Pt had syncopal event in November this year. Denies fever, chills, cp, sob, n/v/d, abdominal pain, dysuria.     HEAD CT: No evidence of acute intracranial hemorrhage or acute territorial infarct. Stable bilateral basal ganglia, and cortical and cerebellar calcifications.  C-SPINE CT: Degenerative changes as described above. No evidence of fracture or facet subluxation.  CT Chest & Abdomen: No evidence of thoracic, abdominal or pelvic mass or inflammatory process. No evidence of thoracic, abdominal or pelvic visceral injury, laceration, or hematoma. Compression fracture of the superior endplate of L1 of indeterminate age. A right-sided hydrocele is noted in the right inguinal canal.  X-RAY: No evidence of acute fracture or dislocation. Normal alignment. Degenerative changes of the hips. Few calcified pelvic phleboliths. No inadvertent radiopaque foreign body.

## 2024-01-03 NOTE — PHYSICAL THERAPY INITIAL EVALUATION ADULT - PERTINENT HX OF CURRENT PROBLEM, REHAB EVAL
74yo M PMHx  of HTN, DL, BPH, depression presents to ER with daughter c/o confusion and frequent falls. As per daughter earlier today pt had turned the gas on the stove but was not cooking anything, the house smelled of gas and pt did not notice. Pt is poor historian AAO x 2, unable to give detailed medical history. Daughter notes pt has has multiple falls recently, no LOC, head trauma. Pt had syncopal event in November this year. Denies fever, chills, cp, sob, n/v/d, abdominal pain, dysuria.

## 2024-01-04 LAB
ALBUMIN SERPL ELPH-MCNC: 3.2 G/DL — LOW (ref 3.5–5.2)
ALBUMIN SERPL ELPH-MCNC: 3.2 G/DL — LOW (ref 3.5–5.2)
ALP SERPL-CCNC: 107 U/L — SIGNIFICANT CHANGE UP (ref 30–115)
ALP SERPL-CCNC: 107 U/L — SIGNIFICANT CHANGE UP (ref 30–115)
ALT FLD-CCNC: 30 U/L — SIGNIFICANT CHANGE UP (ref 0–41)
ALT FLD-CCNC: 30 U/L — SIGNIFICANT CHANGE UP (ref 0–41)
AMMONIA BLD-MCNC: 21 UMOL/L — SIGNIFICANT CHANGE UP (ref 11–55)
AMMONIA BLD-MCNC: 21 UMOL/L — SIGNIFICANT CHANGE UP (ref 11–55)
ANION GAP SERPL CALC-SCNC: 11 MMOL/L — SIGNIFICANT CHANGE UP (ref 7–14)
ANION GAP SERPL CALC-SCNC: 11 MMOL/L — SIGNIFICANT CHANGE UP (ref 7–14)
APPEARANCE UR: CLEAR — SIGNIFICANT CHANGE UP
APPEARANCE UR: CLEAR — SIGNIFICANT CHANGE UP
AST SERPL-CCNC: 38 U/L — SIGNIFICANT CHANGE UP (ref 0–41)
AST SERPL-CCNC: 38 U/L — SIGNIFICANT CHANGE UP (ref 0–41)
BILIRUB SERPL-MCNC: 1.3 MG/DL — HIGH (ref 0.2–1.2)
BILIRUB SERPL-MCNC: 1.3 MG/DL — HIGH (ref 0.2–1.2)
BILIRUB UR-MCNC: NEGATIVE — SIGNIFICANT CHANGE UP
BILIRUB UR-MCNC: NEGATIVE — SIGNIFICANT CHANGE UP
BUN SERPL-MCNC: 36 MG/DL — HIGH (ref 10–20)
BUN SERPL-MCNC: 36 MG/DL — HIGH (ref 10–20)
CALCIUM SERPL-MCNC: 7.8 MG/DL — LOW (ref 8.4–10.5)
CALCIUM SERPL-MCNC: 7.8 MG/DL — LOW (ref 8.4–10.5)
CHLORIDE SERPL-SCNC: 106 MMOL/L — SIGNIFICANT CHANGE UP (ref 98–110)
CHLORIDE SERPL-SCNC: 106 MMOL/L — SIGNIFICANT CHANGE UP (ref 98–110)
CK SERPL-CCNC: 543 U/L — HIGH (ref 0–225)
CK SERPL-CCNC: 543 U/L — HIGH (ref 0–225)
CO2 SERPL-SCNC: 23 MMOL/L — SIGNIFICANT CHANGE UP (ref 17–32)
CO2 SERPL-SCNC: 23 MMOL/L — SIGNIFICANT CHANGE UP (ref 17–32)
COLOR SPEC: YELLOW — SIGNIFICANT CHANGE UP
COLOR SPEC: YELLOW — SIGNIFICANT CHANGE UP
CREAT ?TM UR-MCNC: 102 MG/DL — SIGNIFICANT CHANGE UP
CREAT ?TM UR-MCNC: 102 MG/DL — SIGNIFICANT CHANGE UP
CREAT SERPL-MCNC: 1.2 MG/DL — SIGNIFICANT CHANGE UP (ref 0.7–1.5)
CREAT SERPL-MCNC: 1.2 MG/DL — SIGNIFICANT CHANGE UP (ref 0.7–1.5)
DIFF PNL FLD: NEGATIVE — SIGNIFICANT CHANGE UP
DIFF PNL FLD: NEGATIVE — SIGNIFICANT CHANGE UP
EGFR: 63 ML/MIN/1.73M2 — SIGNIFICANT CHANGE UP
EGFR: 63 ML/MIN/1.73M2 — SIGNIFICANT CHANGE UP
FERRITIN SERPL-MCNC: 613 NG/ML — HIGH (ref 30–400)
FERRITIN SERPL-MCNC: 613 NG/ML — HIGH (ref 30–400)
FOLATE SERPL-MCNC: 18.8 NG/ML — SIGNIFICANT CHANGE UP
FOLATE SERPL-MCNC: 18.8 NG/ML — SIGNIFICANT CHANGE UP
GLUCOSE SERPL-MCNC: 99 MG/DL — SIGNIFICANT CHANGE UP (ref 70–99)
GLUCOSE SERPL-MCNC: 99 MG/DL — SIGNIFICANT CHANGE UP (ref 70–99)
GLUCOSE UR QL: NEGATIVE MG/DL — SIGNIFICANT CHANGE UP
GLUCOSE UR QL: NEGATIVE MG/DL — SIGNIFICANT CHANGE UP
HCT VFR BLD CALC: 23.8 % — LOW (ref 42–52)
HCT VFR BLD CALC: 23.8 % — LOW (ref 42–52)
HGB BLD-MCNC: 8 G/DL — LOW (ref 14–18)
HGB BLD-MCNC: 8 G/DL — LOW (ref 14–18)
KETONES UR-MCNC: NEGATIVE MG/DL — SIGNIFICANT CHANGE UP
KETONES UR-MCNC: NEGATIVE MG/DL — SIGNIFICANT CHANGE UP
LEUKOCYTE ESTERASE UR-ACNC: NEGATIVE — SIGNIFICANT CHANGE UP
LEUKOCYTE ESTERASE UR-ACNC: NEGATIVE — SIGNIFICANT CHANGE UP
MCHC RBC-ENTMCNC: 30.7 PG — SIGNIFICANT CHANGE UP (ref 27–31)
MCHC RBC-ENTMCNC: 30.7 PG — SIGNIFICANT CHANGE UP (ref 27–31)
MCHC RBC-ENTMCNC: 33.6 G/DL — SIGNIFICANT CHANGE UP (ref 32–37)
MCHC RBC-ENTMCNC: 33.6 G/DL — SIGNIFICANT CHANGE UP (ref 32–37)
MCV RBC AUTO: 91.2 FL — SIGNIFICANT CHANGE UP (ref 80–94)
MCV RBC AUTO: 91.2 FL — SIGNIFICANT CHANGE UP (ref 80–94)
NITRITE UR-MCNC: NEGATIVE — SIGNIFICANT CHANGE UP
NITRITE UR-MCNC: NEGATIVE — SIGNIFICANT CHANGE UP
NRBC # BLD: 0 /100 WBCS — SIGNIFICANT CHANGE UP (ref 0–0)
NRBC # BLD: 0 /100 WBCS — SIGNIFICANT CHANGE UP (ref 0–0)
PH UR: 5.5 — SIGNIFICANT CHANGE UP (ref 5–8)
PH UR: 5.5 — SIGNIFICANT CHANGE UP (ref 5–8)
PLATELET # BLD AUTO: 248 K/UL — SIGNIFICANT CHANGE UP (ref 130–400)
PLATELET # BLD AUTO: 248 K/UL — SIGNIFICANT CHANGE UP (ref 130–400)
PMV BLD: 9.7 FL — SIGNIFICANT CHANGE UP (ref 7.4–10.4)
PMV BLD: 9.7 FL — SIGNIFICANT CHANGE UP (ref 7.4–10.4)
POTASSIUM SERPL-MCNC: 4.2 MMOL/L — SIGNIFICANT CHANGE UP (ref 3.5–5)
POTASSIUM SERPL-MCNC: 4.2 MMOL/L — SIGNIFICANT CHANGE UP (ref 3.5–5)
POTASSIUM SERPL-SCNC: 4.2 MMOL/L — SIGNIFICANT CHANGE UP (ref 3.5–5)
POTASSIUM SERPL-SCNC: 4.2 MMOL/L — SIGNIFICANT CHANGE UP (ref 3.5–5)
PROT ?TM UR-MCNC: 26 MG/DLG/24H — SIGNIFICANT CHANGE UP
PROT ?TM UR-MCNC: 26 MG/DLG/24H — SIGNIFICANT CHANGE UP
PROT SERPL-MCNC: 5.1 G/DL — LOW (ref 6–8)
PROT SERPL-MCNC: 5.1 G/DL — LOW (ref 6–8)
PROT UR-MCNC: SIGNIFICANT CHANGE UP MG/DL
PROT UR-MCNC: SIGNIFICANT CHANGE UP MG/DL
PROT/CREAT UR-RTO: 0.3 RATIO — HIGH (ref 0–0.2)
PROT/CREAT UR-RTO: 0.3 RATIO — HIGH (ref 0–0.2)
RBC # BLD: 2.61 M/UL — LOW (ref 4.7–6.1)
RBC # BLD: 2.61 M/UL — LOW (ref 4.7–6.1)
RBC # FLD: 14.5 % — SIGNIFICANT CHANGE UP (ref 11.5–14.5)
RBC # FLD: 14.5 % — SIGNIFICANT CHANGE UP (ref 11.5–14.5)
SODIUM SERPL-SCNC: 140 MMOL/L — SIGNIFICANT CHANGE UP (ref 135–146)
SODIUM SERPL-SCNC: 140 MMOL/L — SIGNIFICANT CHANGE UP (ref 135–146)
SODIUM UR-SCNC: <20 MMOL/L — SIGNIFICANT CHANGE UP
SODIUM UR-SCNC: <20 MMOL/L — SIGNIFICANT CHANGE UP
SP GR SPEC: 1.03 — SIGNIFICANT CHANGE UP (ref 1–1.03)
SP GR SPEC: 1.03 — SIGNIFICANT CHANGE UP (ref 1–1.03)
T PALLIDUM AB TITR SER: NEGATIVE — SIGNIFICANT CHANGE UP
T PALLIDUM AB TITR SER: NEGATIVE — SIGNIFICANT CHANGE UP
UROBILINOGEN FLD QL: 0.2 MG/DL — SIGNIFICANT CHANGE UP (ref 0.2–1)
UROBILINOGEN FLD QL: 0.2 MG/DL — SIGNIFICANT CHANGE UP (ref 0.2–1)
VIT B12 SERPL-MCNC: 781 PG/ML — SIGNIFICANT CHANGE UP (ref 232–1245)
VIT B12 SERPL-MCNC: 781 PG/ML — SIGNIFICANT CHANGE UP (ref 232–1245)
WBC # BLD: 8.45 K/UL — SIGNIFICANT CHANGE UP (ref 4.8–10.8)
WBC # BLD: 8.45 K/UL — SIGNIFICANT CHANGE UP (ref 4.8–10.8)
WBC # FLD AUTO: 8.45 K/UL — SIGNIFICANT CHANGE UP (ref 4.8–10.8)
WBC # FLD AUTO: 8.45 K/UL — SIGNIFICANT CHANGE UP (ref 4.8–10.8)

## 2024-01-04 PROCEDURE — 99232 SBSQ HOSP IP/OBS MODERATE 35: CPT

## 2024-01-04 PROCEDURE — 99233 SBSQ HOSP IP/OBS HIGH 50: CPT

## 2024-01-04 RX ADMIN — Medication 325 MILLIGRAM(S): at 11:08

## 2024-01-04 RX ADMIN — Medication 40 MILLIGRAM(S): at 11:09

## 2024-01-04 RX ADMIN — OLANZAPINE 2.5 MILLIGRAM(S): 15 TABLET, FILM COATED ORAL at 21:24

## 2024-01-04 RX ADMIN — SODIUM CHLORIDE 75 MILLILITER(S): 9 INJECTION, SOLUTION INTRAVENOUS at 05:08

## 2024-01-04 RX ADMIN — PANTOPRAZOLE SODIUM 40 MILLIGRAM(S): 20 TABLET, DELAYED RELEASE ORAL at 11:09

## 2024-01-04 RX ADMIN — AMLODIPINE BESYLATE 2.5 MILLIGRAM(S): 2.5 TABLET ORAL at 05:09

## 2024-01-04 RX ADMIN — ATORVASTATIN CALCIUM 10 MILLIGRAM(S): 80 TABLET, FILM COATED ORAL at 21:24

## 2024-01-04 RX ADMIN — TAMSULOSIN HYDROCHLORIDE 0.4 MILLIGRAM(S): 0.4 CAPSULE ORAL at 21:24

## 2024-01-04 NOTE — PROGRESS NOTE ADULT - ASSESSMENT
73yMale pmh HTN, DL, BPH presents for confusion and falls. GI consulted for anemia no gross GI bleeding. patient reports he never had a GI workup before.    #anemia no gross GI bleeding  normocytic, likely multifactorial anemia of chronic disease, possible avms, PUD?  patient with ecchymosis on back, b/l on gluteal area and perianal areas  rectal exam-reveals brown stool  Rec  -workup for confusion  -while we do not suspect GI bleed to be source of anemia will consider EGD/Colonoscopy next week after workup for confusion complete and patient at baseline and other sources of anemia ruled out given falls and ecchymosis   -Maintain Hemodynamic Stability   -Monitor CBC  -CMP,Optimize Electrolytes  -PT,PTT,INR  -EKG, Chest-Xray   -Transfuse prn to hgb >8  -Two large bore IV lines  - PPI BID  -Monitor Vital Signs  -Monitor Stool For blood, frequency, consistency, melena  -Active Type and Screen  -Iron Studies, Folate, Vitamin B12 levels     #Compression fracture of the superior endplate of L1 of indeterminate age.  Rec  - Care as per primary team     #A right-sided hydrocele is noted in the right inguinal canal  Rec  - Care as per primary team    73yMale pmh HTN, DL, BPH presents for confusion and falls. GI consulted for anemia no gross GI bleeding. patient reports he never had a GI workup before.    #anemia no gross GI bleeding  normocytic, likely multifactorial anemia of chronic disease, possible avms, PUD?  patient with ecchymosis on back, b/l on gluteal area and perianal areas  rectal exam-reveals brown stool  Rec  -workup for confusion  -while we do not suspect GI bleed to be source of anemia will consider EGD/Colonoscopy next week after workup for confusion complete and patient at baseline and other sources of anemia ruled out given falls and ecchymosis   -Maintain Hemodynamic Stability   -Monitor CBC  -CMP,Optimize Electrolytes  -PT,PTT,INR  -EKG, Chest-Xray   -Transfuse prn to hgb >8  -Two large bore IV lines  - PPI BID  -Monitor Vital Signs  -Monitor Stool For blood, frequency, consistency, melena  -Active Type and Screen  -Iron Studies, Folate, Vitamin B12 levels     #isolated hyperbilirubinemia  Rec  -can fractionate T-bili  -monitor LFTs    #Compression fracture of the superior endplate of L1 of indeterminate age.  Rec  - Care as per primary team     #A right-sided hydrocele is noted in the right inguinal canal  Rec  - Care as per primary team       Recall GI if needed  73yMale pmh HTN, DL, BPH presents for confusion and falls. GI consulted for anemia no gross GI bleeding. patient reports he never had a GI workup before.    #anemia no gross GI bleeding  normocytic, likely multifactorial anemia of chronic disease, possible avms, PUD?  patient with ecchymosis on back, b/l on gluteal area and perianal areas  rectal exam-reveals brown stool  Rec  -workup for confusion  -while we do not suspect GI bleed to be source of anemia will consider EGD/Colonoscopy next week after workup for confusion complete and patient at baseline and other sources of anemia ruled out given falls and ecchymosis   -Maintain Hemodynamic Stability   -Monitor CBC  -CMP,Optimize Electrolytes  -PT,PTT,INR  -EKG, Chest-Xray   -Transfuse prn to hgb >8  -Two large bore IV lines  - PPI BID  -Monitor Vital Signs  -Monitor Stool For blood, frequency, consistency, melena  -Active Type and Screen  -Iron Studies, Folate, Vitamin B12 levels     #isolated hyperbilirubinemia  Rec  -can fractionate T-bili  -monitor LFTs    #Compression fracture of the superior endplate of L1 of indeterminate age.  Rec  - Care as per primary team     #A right-sided hydrocele is noted in the right inguinal canal  Rec  - Care as per primary team

## 2024-01-04 NOTE — PROGRESS NOTE ADULT - SUBJECTIVE AND OBJECTIVE BOX
73yMale  Being followed for anemia no gross GI bleeding   Interval history: Patient denies nausea, vomiting, hematemesis, melena, blood in stool, diarrhea, constipation, abdominal pain. Patient tolerating diet without complaints.      PAST MEDICAL & SURGICAL HISTORY:   Hypertension      BPH (benign prostatic hyperplasia)      Skin cancer      Anxiety      Depression      Hypercholesteremia      Status post Mohs surgery      Inguinal hernia  left 2 years ago                Social History: No smoking. No alcohol. No illegal drug use.          MEDICATIONS  (STANDING):  amLODIPine   Tablet 2.5 milliGRAM(s) Oral daily  atorvastatin 10 milliGRAM(s) Oral at bedtime  ferrous    sulfate 325 milliGRAM(s) Oral daily  lactated ringers. 1000 milliLiter(s) (75 mL/Hr) IV Continuous <Continuous>  OLANZapine 2.5 milliGRAM(s) Oral at bedtime  pantoprazole    Tablet 40 milliGRAM(s) Oral before breakfast  PARoxetine 40 milliGRAM(s) Oral daily  tamsulosin 0.4 milliGRAM(s) Oral at bedtime    MEDICATIONS  (PRN):  acetaminophen     Tablet .. 650 milliGRAM(s) Oral every 6 hours PRN Temp greater or equal to 38C (100.4F), Mild Pain (1 - 3)       Allergies:   No Known Allergies  Intolerances          REVIEW OF SYSTEMS:        VITAL SIGNS:   T(F): 97.4 (01-04-24 @ 05:31), Max: 97.4 (01-04-24 @ 05:31)  HR: 95 (01-04-24 @ 05:31) (60 - 95)  BP: 168/60 (01-04-24 @ 05:31) (128/71 - 168/60)  RR: 18 (01-04-24 @ 05:31) (18 - 18)  SpO2: --    PHYSICAL EXAM:            LABS:                        8.0    8.45  )-----------( 248      ( 04 Jan 2024 08:31 )             23.8     01-04    140  |  106  |  36<H>  ----------------------------<  99  4.2   |  23  |  1.2    Ca    7.8<L>      04 Jan 2024 08:31    TPro  5.1<L>  /  Alb  3.2<L>  /  TBili  1.3<H>  /  DBili  x   /  AST  38  /  ALT  30  /  AlkPhos  107  01-04    LIVER FUNCTIONS - ( 04 Jan 2024 08:31 )  Alb: 3.2 g/dL / Pro: 5.1 g/dL / ALK PHOS: 107 U/L / ALT: 30 U/L / AST: 38 U/L / GGT: x           PT/INR - ( 02 Jan 2024 17:00 )   PT: 11.70 sec;   INR: 1.03 ratio         PTT - ( 02 Jan 2024 17:00 )  PTT:26.8 sec    IMAGING:    < from: CT Abdomen and Pelvis w/ IV Cont (01.02.24 @ 17:37) >    ACC: [id number] EXAM:  CT ABDOMEN AND PELVIS IC   ORDERED BY: DAVID FERRER     ACC: [id number] EXAM:  CT CHEST IC   ORDERED BY: DAVID FERRER     PROCEDURE DATE:  01/02/2024          INTERPRETATION:  REASON FOR EXAM / CLINICAL STATEMENT:  Trauma Code.   Multiple falls. AMS. Confusion. WBC 15.82   PMHx of HTN, DL, BPH,   depressive psychosis (olanzapine)      TECHNIQUE:  Contiguous axial CT images were obtained from the thoracic   inlet through the pubic symphysis with IV contrast. IV contrast was   employed (Omnipaque 350); 95 ml was administered and 5 ml was discarded.   Reformatted images in the coronal and sagittal planes were acquired.      COMPARISON CT: None    OTHER STUDIES USED FOR CORRELATION: None.      FINDINGS / CHEST:    TUBESAND LINES: None.    HEART AND VESSELS: The heart is normal in size. There is no pericardial   effusion.    There is no evidence of central pulmonary embolism.    Normal caliber aorta and pulmonary artery. Aortic calcifications are   noted. There is no evidence of aortic aneurysm or dissection.    Brachiocephalic artery calcification, .    MEDIASTINUM: There are no suspicious mediastinal, hilar or axillary lymph   nodes. The visualized portion of the thyroid gland is unremarkable.    AIRWAYS, LUNGS AND PLEURA: The central tracheobronchial tree is patent.   There are mild dependent atelectatic changes at the lung bases.  There is   no pleural effusion. Bilateral apical pleural calcifications. There is no   pneumothorax.    BONES AND SOFT TISSUES: Degenerative changes of the spine.      FINDINGS / ABDOMEN AND PELVIS:      HEPATIC: The liver is normal in size with no evidence of solid mass or   bile duct dilatation. Hepatic steatosis is noted. The portal vein is   patent. The hepatic veins are opacified.    BILIARY: No calcified gallstones are noted.    SPLEEN: Unremarkable.    PANCREAS: The pancreas is normal in size and configuration. No evidence   of mass or pancreatitis.    ADRENAL GLANDS: Unremarkable.    KIDNEYS: There is symmetric renal enhancement. No evidence of   hydronephrosis, calcified stones, or solid mass.    ABDOMINOPELVIC NODES: Unremarkable.    PELVIC ORGANS: A right-sided hydrocele is noted in the right inguinal   canal. No evidence of pelvic mass, lymphadenopathy, or fluid collection.    BLADDER: Unremarkable.    PERITONEUM/MESENTERY/BOWEL: No evidence of bowel obstruction, colitis,   inflammatory process, or ascites. No pneumoperitoneum.    BONES/SOFT TISSUES: Degenerative changes of the spine are noted.   Compression fracture of the superior endplate of L1 of indeterminate age.    OTHER: Dense aortoiliac calcifications are noted with no evidence of   abdominal aortic aneurysm.      IMPRESSION:    No evidence of thoracic, abdominal or pelvic mass or inflammatory process.    No evidence of thoracic, abdominal or pelvic visceral injury, laceration,   or hematoma.    Compression fracture of the superior endplate of L1 of indeterminate age.    A right-sided hydrocele is noted in the right inguinal canal.    --- End of Report ---            SOO PEPE MD; Attending Interventional Radiologist  This document has been electronically signed. Jan 2 2024  7:07PM    < end of copied text >             73yMale  Being followed for anemia no gross GI bleeding   Interval history: Patient denies nausea, vomiting, hematemesis, melena, blood in stool, diarrhea, constipation, abdominal pain. Patient tolerating diet without complaints.      PAST MEDICAL & SURGICAL HISTORY:   Hypertension      BPH (benign prostatic hyperplasia)      Skin cancer      Anxiety      Depression      Hypercholesteremia      Status post Mohs surgery      Inguinal hernia  left 2 years ago                Social History: No smoking. No alcohol. No illegal drug use.          MEDICATIONS  (STANDING):  amLODIPine   Tablet 2.5 milliGRAM(s) Oral daily  atorvastatin 10 milliGRAM(s) Oral at bedtime  ferrous    sulfate 325 milliGRAM(s) Oral daily  lactated ringers. 1000 milliLiter(s) (75 mL/Hr) IV Continuous <Continuous>  OLANZapine 2.5 milliGRAM(s) Oral at bedtime  pantoprazole    Tablet 40 milliGRAM(s) Oral before breakfast  PARoxetine 40 milliGRAM(s) Oral daily  tamsulosin 0.4 milliGRAM(s) Oral at bedtime    MEDICATIONS  (PRN):  acetaminophen     Tablet .. 650 milliGRAM(s) Oral every 6 hours PRN Temp greater or equal to 38C (100.4F), Mild Pain (1 - 3)       Allergies:   No Known Allergies  Intolerances          REVIEW OF SYSTEMS:        VITAL SIGNS:   T(F): 97.4 (01-04-24 @ 05:31), Max: 97.4 (01-04-24 @ 05:31)  HR: 95 (01-04-24 @ 05:31) (60 - 95)  BP: 168/60 (01-04-24 @ 05:31) (128/71 - 168/60)  RR: 18 (01-04-24 @ 05:31) (18 - 18)  SpO2: --    PHYSICAL EXAM:            LABS:                        8.0    8.45  )-----------( 248      ( 04 Jan 2024 08:31 )             23.8     01-04    140  |  106  |  36<H>  ----------------------------<  99  4.2   |  23  |  1.2    Ca    7.8<L>      04 Jan 2024 08:31    TPro  5.1<L>  /  Alb  3.2<L>  /  TBili  1.3<H>  /  DBili  x   /  AST  38  /  ALT  30  /  AlkPhos  107  01-04    LIVER FUNCTIONS - ( 04 Jan 2024 08:31 )  Alb: 3.2 g/dL / Pro: 5.1 g/dL / ALK PHOS: 107 U/L / ALT: 30 U/L / AST: 38 U/L / GGT: x           PT/INR - ( 02 Jan 2024 17:00 )   PT: 11.70 sec;   INR: 1.03 ratio         PTT - ( 02 Jan 2024 17:00 )  PTT:26.8 sec    IMAGING:    < from: CT Abdomen and Pelvis w/ IV Cont (01.02.24 @ 17:37) >    ACC: [id number] EXAM:  CT ABDOMEN AND PELVIS IC   ORDERED BY: DAVID FERRER     ACC: [id number] EXAM:  CT CHEST IC   ORDERED BY: DAVID FERRER     PROCEDURE DATE:  01/02/2024          INTERPRETATION:  REASON FOR EXAM / CLINICAL STATEMENT:  Trauma Code.   Multiple falls. AMS. Confusion. WBC 15.82   PMHx of HTN, DL, BPH,   depressive psychosis (olanzapine)      TECHNIQUE:  Contiguous axial CT images were obtained from the thoracic   inlet through the pubic symphysis with IV contrast. IV contrast was   employed (Omnipaque 350); 95 ml was administered and 5 ml was discarded.   Reformatted images in the coronal and sagittal planes were acquired.      COMPARISON CT: None    OTHER STUDIES USED FOR CORRELATION: None.      FINDINGS / CHEST:    TUBESAND LINES: None.    HEART AND VESSELS: The heart is normal in size. There is no pericardial   effusion.    There is no evidence of central pulmonary embolism.    Normal caliber aorta and pulmonary artery. Aortic calcifications are   noted. There is no evidence of aortic aneurysm or dissection.    Brachiocephalic artery calcification, .    MEDIASTINUM: There are no suspicious mediastinal, hilar or axillary lymph   nodes. The visualized portion of the thyroid gland is unremarkable.    AIRWAYS, LUNGS AND PLEURA: The central tracheobronchial tree is patent.   There are mild dependent atelectatic changes at the lung bases.  There is   no pleural effusion. Bilateral apical pleural calcifications. There is no   pneumothorax.    BONES AND SOFT TISSUES: Degenerative changes of the spine.      FINDINGS / ABDOMEN AND PELVIS:      HEPATIC: The liver is normal in size with no evidence of solid mass or   bile duct dilatation. Hepatic steatosis is noted. The portal vein is   patent. The hepatic veins are opacified.    BILIARY: No calcified gallstones are noted.    SPLEEN: Unremarkable.    PANCREAS: The pancreas is normal in size and configuration. No evidence   of mass or pancreatitis.    ADRENAL GLANDS: Unremarkable.    KIDNEYS: There is symmetric renal enhancement. No evidence of   hydronephrosis, calcified stones, or solid mass.    ABDOMINOPELVIC NODES: Unremarkable.    PELVIC ORGANS: A right-sided hydrocele is noted in the right inguinal   canal. No evidence of pelvic mass, lymphadenopathy, or fluid collection.    BLADDER: Unremarkable.    PERITONEUM/MESENTERY/BOWEL: No evidence of bowel obstruction, colitis,   inflammatory process, or ascites. No pneumoperitoneum.    BONES/SOFT TISSUES: Degenerative changes of the spine are noted.   Compression fracture of the superior endplate of L1 of indeterminate age.    OTHER: Dense aortoiliac calcifications are noted with no evidence of   abdominal aortic aneurysm.      IMPRESSION:    No evidence of thoracic, abdominal or pelvic mass or inflammatory process.    No evidence of thoracic, abdominal or pelvic visceral injury, laceration,   or hematoma.    Compression fracture of the superior endplate of L1 of indeterminate age.    A right-sided hydrocele is noted in the right inguinal canal.    --- End of Report ---            SOO PEPE MD; Attending Interventional Radiologist  This document has been electronically signed. Jan 2 2024  7:07PM    < end of copied text >             73yMale  Being followed for anemia no gross GI bleeding   Interval history: Patient denies nausea, vomiting, hematemesis, melena, blood in stool, diarrhea, constipation, abdominal pain. Patient tolerating diet without complaints.      PAST MEDICAL & SURGICAL HISTORY:   Hypertension      BPH (benign prostatic hyperplasia)      Skin cancer      Anxiety      Depression      Hypercholesteremia      Status post Mohs surgery      Inguinal hernia  left 2 years ago                Social History: No smoking. No alcohol. No illegal drug use.          MEDICATIONS  (STANDING):  amLODIPine   Tablet 2.5 milliGRAM(s) Oral daily  atorvastatin 10 milliGRAM(s) Oral at bedtime  ferrous    sulfate 325 milliGRAM(s) Oral daily  lactated ringers. 1000 milliLiter(s) (75 mL/Hr) IV Continuous <Continuous>  OLANZapine 2.5 milliGRAM(s) Oral at bedtime  pantoprazole    Tablet 40 milliGRAM(s) Oral before breakfast  PARoxetine 40 milliGRAM(s) Oral daily  tamsulosin 0.4 milliGRAM(s) Oral at bedtime    MEDICATIONS  (PRN):  acetaminophen     Tablet .. 650 milliGRAM(s) Oral every 6 hours PRN Temp greater or equal to 38C (100.4F), Mild Pain (1 - 3)       Allergies:   No Known Allergies  Intolerances          REVIEW OF SYSTEMS:  General:  No weight loss, fevers, or chills.  Eyes:  No reported pain or visual changes  ENT:  No sore throat or runny nose.  NECK: No stiffness or lymphadenopathy  CV:  No chest pain or palpitations.  Resp:  No shortness of breath, cough, wheezing or hemoptysis  GI:  No abdominal pain, nausea, vomiting, dysphagia, diarrhea or constipation. No rectal bleeding, melena, or hematemesis.  Neuro:  No tingling, numbness         VITAL SIGNS:   T(F): 97.4 (01-04-24 @ 05:31), Max: 97.4 (01-04-24 @ 05:31)  HR: 95 (01-04-24 @ 05:31) (60 - 95)  BP: 168/60 (01-04-24 @ 05:31) (128/71 - 168/60)  RR: 18 (01-04-24 @ 05:31) (18 - 18)  SpO2: --    PHYSICAL EXAM:            LABS:                        8.0    8.45  )-----------( 248      ( 04 Jan 2024 08:31 )             23.8     01-04    140  |  106  |  36<H>  ----------------------------<  99  4.2   |  23  |  1.2    Ca    7.8<L>      04 Jan 2024 08:31    TPro  5.1<L>  /  Alb  3.2<L>  /  TBili  1.3<H>  /  DBili  x   /  AST  38  /  ALT  30  /  AlkPhos  107  01-04    LIVER FUNCTIONS - ( 04 Jan 2024 08:31 )  Alb: 3.2 g/dL / Pro: 5.1 g/dL / ALK PHOS: 107 U/L / ALT: 30 U/L / AST: 38 U/L / GGT: x           PT/INR - ( 02 Jan 2024 17:00 )   PT: 11.70 sec;   INR: 1.03 ratio         PTT - ( 02 Jan 2024 17:00 )  PTT:26.8 sec    IMAGING:    < from: CT Abdomen and Pelvis w/ IV Cont (01.02.24 @ 17:37) >    ACC: [id number] EXAM:  CT ABDOMEN AND PELVIS IC   ORDERED BY: DAVID FERRER     ACC: [id number] EXAM:  CT CHEST IC   ORDERED BY: DAVID FERRER     PROCEDURE DATE:  01/02/2024          INTERPRETATION:  REASON FOR EXAM / CLINICAL STATEMENT:  Trauma Code.   Multiple falls. AMS. Confusion. WBC 15.82   PMHx of HTN, DL, BPH,   depressive psychosis (olanzapine)      TECHNIQUE:  Contiguous axial CT images were obtained from the thoracic   inlet through the pubic symphysis with IV contrast. IV contrast was   employed (Omnipaque 350); 95 ml was administered and 5 ml was discarded.   Reformatted images in the coronal and sagittal planes were acquired.      COMPARISON CT: None    OTHER STUDIES USED FOR CORRELATION: None.      FINDINGS / CHEST:    TUBESAND LINES: None.    HEART AND VESSELS: The heart is normal in size. There is no pericardial   effusion.    There is no evidence of central pulmonary embolism.    Normal caliber aorta and pulmonary artery. Aortic calcifications are   noted. There is no evidence of aortic aneurysm or dissection.    Brachiocephalic artery calcification, .    MEDIASTINUM: There are no suspicious mediastinal, hilar or axillary lymph   nodes. The visualized portion of the thyroid gland is unremarkable.    AIRWAYS, LUNGS AND PLEURA: The central tracheobronchial tree is patent.   There are mild dependent atelectatic changes at the lung bases.  There is   no pleural effusion. Bilateral apical pleural calcifications. There is no   pneumothorax.    BONES AND SOFT TISSUES: Degenerative changes of the spine.      FINDINGS / ABDOMEN AND PELVIS:      HEPATIC: The liver is normal in size with no evidence of solid mass or   bile duct dilatation. Hepatic steatosis is noted. The portal vein is   patent. The hepatic veins are opacified.    BILIARY: No calcified gallstones are noted.    SPLEEN: Unremarkable.    PANCREAS: The pancreas is normal in size and configuration. No evidence   of mass or pancreatitis.    ADRENAL GLANDS: Unremarkable.    KIDNEYS: There is symmetric renal enhancement. No evidence of   hydronephrosis, calcified stones, or solid mass.    ABDOMINOPELVIC NODES: Unremarkable.    PELVIC ORGANS: A right-sided hydrocele is noted in the right inguinal   canal. No evidence of pelvic mass, lymphadenopathy, or fluid collection.    BLADDER: Unremarkable.    PERITONEUM/MESENTERY/BOWEL: No evidence of bowel obstruction, colitis,   inflammatory process, or ascites. No pneumoperitoneum.    BONES/SOFT TISSUES: Degenerative changes of the spine are noted.   Compression fracture of the superior endplate of L1 of indeterminate age.    OTHER: Dense aortoiliac calcifications are noted with no evidence of   abdominal aortic aneurysm.      IMPRESSION:    No evidence of thoracic, abdominal or pelvic mass or inflammatory process.    No evidence of thoracic, abdominal or pelvic visceral injury, laceration,   or hematoma.    Compression fracture of the superior endplate of L1 of indeterminate age.    A right-sided hydrocele is noted in the right inguinal canal.    --- End of Report ---            SOO PEPE MD; Attending Interventional Radiologist  This document has been electronically signed. Jan 2 2024  7:07PM    < end of copied text >             73yMale  Being followed for anemia no gross GI bleeding   Interval history: Patient denies nausea, vomiting, hematemesis, melena, blood in stool, diarrhea, constipation, abdominal pain. Patient tolerating diet without complaints.      PAST MEDICAL & SURGICAL HISTORY:   Hypertension      BPH (benign prostatic hyperplasia)      Skin cancer      Anxiety      Depression      Hypercholesteremia      Status post Mohs surgery      Inguinal hernia  left 2 years ago                Social History: No smoking. No alcohol. No illegal drug use.          MEDICATIONS  (STANDING):  amLODIPine   Tablet 2.5 milliGRAM(s) Oral daily  atorvastatin 10 milliGRAM(s) Oral at bedtime  ferrous    sulfate 325 milliGRAM(s) Oral daily  lactated ringers. 1000 milliLiter(s) (75 mL/Hr) IV Continuous <Continuous>  OLANZapine 2.5 milliGRAM(s) Oral at bedtime  pantoprazole    Tablet 40 milliGRAM(s) Oral before breakfast  PARoxetine 40 milliGRAM(s) Oral daily  tamsulosin 0.4 milliGRAM(s) Oral at bedtime    MEDICATIONS  (PRN):  acetaminophen     Tablet .. 650 milliGRAM(s) Oral every 6 hours PRN Temp greater or equal to 38C (100.4F), Mild Pain (1 - 3)       Allergies:   No Known Allergies  Intolerances          REVIEW OF SYSTEMS:  General:  No weight loss, fevers, or chills.  Eyes:  No reported pain or visual changes  ENT:  No sore throat or runny nose.  NECK: No stiffness or lymphadenopathy  CV:  No chest pain or palpitations.  Resp:  No shortness of breath, cough, wheezing or hemoptysis  GI:  No abdominal pain, nausea, vomiting, dysphagia, diarrhea or constipation. No rectal bleeding, melena, or hematemesis.  Neuro:  No tingling, numbness         VITAL SIGNS:   T(F): 97.4 (01-04-24 @ 05:31), Max: 97.4 (01-04-24 @ 05:31)  HR: 95 (01-04-24 @ 05:31) (60 - 95)  BP: 168/60 (01-04-24 @ 05:31) (128/71 - 168/60)  RR: 18 (01-04-24 @ 05:31) (18 - 18)  SpO2: --    PHYSICAL EXAM:            LABS:                        8.0    8.45  )-----------( 248      ( 04 Jan 2024 08:31 )             23.8     01-04    140  |  106  |  36<H>  ----------------------------<  99  4.2   |  23  |  1.2    Ca    7.8<L>      04 Jan 2024 08:31    TPro  5.1<L>  /  Alb  3.2<L>  /  TBili  1.3<H>  /  DBili  x   /  AST  38  /  ALT  30  /  AlkPhos  107  01-04    LIVER FUNCTIONS - ( 04 Jan 2024 08:31 )  Alb: 3.2 g/dL / Pro: 5.1 g/dL / ALK PHOS: 107 U/L / ALT: 30 U/L / AST: 38 U/L / GGT: x           PT/INR - ( 02 Jan 2024 17:00 )   PT: 11.70 sec;   INR: 1.03 ratio         PTT - ( 02 Jan 2024 17:00 )  PTT:26.8 sec    IMAGING:    < from: CT Abdomen and Pelvis w/ IV Cont (01.02.24 @ 17:37) >    ACC: [id number] EXAM:  CT ABDOMEN AND PELVIS IC   ORDERED BY: DAVID FERRER     ACC: [id number] EXAM:  CT CHEST IC   ORDERED BY: DAVID FERRER     PROCEDURE DATE:  01/02/2024          INTERPRETATION:  REASON FOR EXAM / CLINICAL STATEMENT:  Trauma Code.   Multiple falls. AMS. Confusion. WBC 15.82   PMHx of HTN, DL, BPH,   depressive psychosis (olanzapine)      TECHNIQUE:  Contiguous axial CT images were obtained from the thoracic   inlet through the pubic symphysis with IV contrast. IV contrast was   employed (Omnipaque 350); 95 ml was administered and 5 ml was discarded.   Reformatted images in the coronal and sagittal planes were acquired.      COMPARISON CT: None    OTHER STUDIES USED FOR CORRELATION: None.      FINDINGS / CHEST:    TUBESAND LINES: None.    HEART AND VESSELS: The heart is normal in size. There is no pericardial   effusion.    There is no evidence of central pulmonary embolism.    Normal caliber aorta and pulmonary artery. Aortic calcifications are   noted. There is no evidence of aortic aneurysm or dissection.    Brachiocephalic artery calcification, .    MEDIASTINUM: There are no suspicious mediastinal, hilar or axillary lymph   nodes. The visualized portion of the thyroid gland is unremarkable.    AIRWAYS, LUNGS AND PLEURA: The central tracheobronchial tree is patent.   There are mild dependent atelectatic changes at the lung bases.  There is   no pleural effusion. Bilateral apical pleural calcifications. There is no   pneumothorax.    BONES AND SOFT TISSUES: Degenerative changes of the spine.      FINDINGS / ABDOMEN AND PELVIS:      HEPATIC: The liver is normal in size with no evidence of solid mass or   bile duct dilatation. Hepatic steatosis is noted. The portal vein is   patent. The hepatic veins are opacified.    BILIARY: No calcified gallstones are noted.    SPLEEN: Unremarkable.    PANCREAS: The pancreas is normal in size and configuration. No evidence   of mass or pancreatitis.    ADRENAL GLANDS: Unremarkable.    KIDNEYS: There is symmetric renal enhancement. No evidence of   hydronephrosis, calcified stones, or solid mass.    ABDOMINOPELVIC NODES: Unremarkable.    PELVIC ORGANS: A right-sided hydrocele is noted in the right inguinal   canal. No evidence of pelvic mass, lymphadenopathy, or fluid collection.    BLADDER: Unremarkable.    PERITONEUM/MESENTERY/BOWEL: No evidence of bowel obstruction, colitis,   inflammatory process, or ascites. No pneumoperitoneum.    BONES/SOFT TISSUES: Degenerative changes of the spine are noted.   Compression fracture of the superior endplate of L1 of indeterminate age.    OTHER: Dense aortoiliac calcifications are noted with no evidence of   abdominal aortic aneurysm.      IMPRESSION:    No evidence of thoracic, abdominal or pelvic mass or inflammatory process.    No evidence of thoracic, abdominal or pelvic visceral injury, laceration,   or hematoma.    Compression fracture of the superior endplate of L1 of indeterminate age.    A right-sided hydrocele is noted in the right inguinal canal.    --- End of Report ---            SOO PEPE MD; Attending Interventional Radiologist  This document has been electronically signed. Jan 2 2024  7:07PM    < end of copied text >             73yMale  Being followed for anemia no gross GI bleeding   Interval history: Patient denies nausea, vomiting, hematemesis, melena, blood in stool, diarrhea, constipation, abdominal pain. Patient tolerating diet without complaints.      PAST MEDICAL & SURGICAL HISTORY:   Hypertension      BPH (benign prostatic hyperplasia)      Skin cancer      Anxiety      Depression      Hypercholesteremia      Status post Mohs surgery      Inguinal hernia  left 2 years ago                Social History: No smoking. No alcohol. No illegal drug use.          MEDICATIONS  (STANDING):  amLODIPine   Tablet 2.5 milliGRAM(s) Oral daily  atorvastatin 10 milliGRAM(s) Oral at bedtime  ferrous    sulfate 325 milliGRAM(s) Oral daily  lactated ringers. 1000 milliLiter(s) (75 mL/Hr) IV Continuous <Continuous>  OLANZapine 2.5 milliGRAM(s) Oral at bedtime  pantoprazole    Tablet 40 milliGRAM(s) Oral before breakfast  PARoxetine 40 milliGRAM(s) Oral daily  tamsulosin 0.4 milliGRAM(s) Oral at bedtime    MEDICATIONS  (PRN):  acetaminophen     Tablet .. 650 milliGRAM(s) Oral every 6 hours PRN Temp greater or equal to 38C (100.4F), Mild Pain (1 - 3)       Allergies:   No Known Allergies  Intolerances          REVIEW OF SYSTEMS:  General:  No weight loss, fevers, or chills.  Eyes:  No reported pain or visual changes  ENT:  No sore throat or runny nose.  NECK: No stiffness or lymphadenopathy  CV:  No chest pain or palpitations.  Resp:  No shortness of breath, cough, wheezing or hemoptysis  GI:  No abdominal pain, nausea, vomiting, dysphagia, diarrhea or constipation. No rectal bleeding, melena, or hematemesis.  Neuro:  No tingling, numbness         VITAL SIGNS:   T(F): 97.4 (01-04-24 @ 05:31), Max: 97.4 (01-04-24 @ 05:31)  HR: 95 (01-04-24 @ 05:31) (60 - 95)  BP: 168/60 (01-04-24 @ 05:31) (128/71 - 168/60)  RR: 18 (01-04-24 @ 05:31) (18 - 18)  SpO2: --    PHYSICAL EXAM:  GENERAL: AAOx2 to person and place not time, no acute distress.  HEAD:  Atraumatic, Normocephalic  EYES: conjunctiva and sclera clear  NECK: Supple, No thyromegaly   CHEST/LUNG: Clear to auscultation bilaterally; No wheeze, rhonchi, or rales  HEART: Regular rate and rhythm; normal S1, S2, No murmurs.  ABDOMEN: Soft, nontender, nondistended; Bowel sounds present  NEUROLOGY: No asterixis or tremor  SKIN: Intact, no jaundice patient with ecchymosis on back, b/l on gluteal area and perianal areas          LABS:                        8.0    8.45  )-----------( 248      ( 04 Jan 2024 08:31 )             23.8     01-04    140  |  106  |  36<H>  ----------------------------<  99  4.2   |  23  |  1.2    Ca    7.8<L>      04 Jan 2024 08:31    TPro  5.1<L>  /  Alb  3.2<L>  /  TBili  1.3<H>  /  DBili  x   /  AST  38  /  ALT  30  /  AlkPhos  107  01-04    LIVER FUNCTIONS - ( 04 Jan 2024 08:31 )  Alb: 3.2 g/dL / Pro: 5.1 g/dL / ALK PHOS: 107 U/L / ALT: 30 U/L / AST: 38 U/L / GGT: x           PT/INR - ( 02 Jan 2024 17:00 )   PT: 11.70 sec;   INR: 1.03 ratio         PTT - ( 02 Jan 2024 17:00 )  PTT:26.8 sec    IMAGING:    < from: CT Abdomen and Pelvis w/ IV Cont (01.02.24 @ 17:37) >    ACC: [id number] EXAM:  CT ABDOMEN AND PELVIS IC   ORDERED BY: DAVID FERRER     ACC: [id number] EXAM:  CT CHEST IC   ORDERED BY: DAVID FERRER     PROCEDURE DATE:  01/02/2024          INTERPRETATION:  REASON FOR EXAM / CLINICAL STATEMENT:  Trauma Code.   Multiple falls. AMS. Confusion. WBC 15.82   PMHx of HTN, DL, BPH,   depressive psychosis (olanzapine)      TECHNIQUE:  Contiguous axial CT images were obtained from the thoracic   inlet through the pubic symphysis with IV contrast. IV contrast was   employed (Omnipaque 350); 95 ml was administered and 5 ml was discarded.   Reformatted images in the coronal and sagittal planes were acquired.      COMPARISON CT: None    OTHER STUDIES USED FOR CORRELATION: None.      FINDINGS / CHEST:    TUBESAND LINES: None.    HEART AND VESSELS: The heart is normal in size. There is no pericardial   effusion.    There is no evidence of central pulmonary embolism.    Normal caliber aorta and pulmonary artery. Aortic calcifications are   noted. There is no evidence of aortic aneurysm or dissection.    Brachiocephalic artery calcification, .    MEDIASTINUM: There are no suspicious mediastinal, hilar or axillary lymph   nodes. The visualized portion of the thyroid gland is unremarkable.    AIRWAYS, LUNGS AND PLEURA: The central tracheobronchial tree is patent.   There are mild dependent atelectatic changes at the lung bases.  There is   no pleural effusion. Bilateral apical pleural calcifications. There is no   pneumothorax.    BONES AND SOFT TISSUES: Degenerative changes of the spine.      FINDINGS / ABDOMEN AND PELVIS:      HEPATIC: The liver is normal in size with no evidence of solid mass or   bile duct dilatation. Hepatic steatosis is noted. The portal vein is   patent. The hepatic veins are opacified.    BILIARY: No calcified gallstones are noted.    SPLEEN: Unremarkable.    PANCREAS: The pancreas is normal in size and configuration. No evidence   of mass or pancreatitis.    ADRENAL GLANDS: Unremarkable.    KIDNEYS: There is symmetric renal enhancement. No evidence of   hydronephrosis, calcified stones, or solid mass.    ABDOMINOPELVIC NODES: Unremarkable.    PELVIC ORGANS: A right-sided hydrocele is noted in the right inguinal   canal. No evidence of pelvic mass, lymphadenopathy, or fluid collection.    BLADDER: Unremarkable.    PERITONEUM/MESENTERY/BOWEL: No evidence of bowel obstruction, colitis,   inflammatory process, or ascites. No pneumoperitoneum.    BONES/SOFT TISSUES: Degenerative changes of the spine are noted.   Compression fracture of the superior endplate of L1 of indeterminate age.    OTHER: Dense aortoiliac calcifications are noted with no evidence of   abdominal aortic aneurysm.      IMPRESSION:    No evidence of thoracic, abdominal or pelvic mass or inflammatory process.    No evidence of thoracic, abdominal or pelvic visceral injury, laceration,   or hematoma.    Compression fracture of the superior endplate of L1 of indeterminate age.    A right-sided hydrocele is noted in the right inguinal canal.    --- End of Report ---            SOO PEPE MD; Attending Interventional Radiologist  This document has been electronically signed. Jan 2 2024  7:07PM    < end of copied text >             73yMale  Being followed for anemia no gross GI bleeding   Interval history: Patient denies nausea, vomiting, hematemesis, melena, blood in stool, diarrhea, constipation, abdominal pain. Patient tolerating diet without complaints.      PAST MEDICAL & SURGICAL HISTORY:   Hypertension      BPH (benign prostatic hyperplasia)      Skin cancer      Anxiety      Depression      Hypercholesteremia      Status post Mohs surgery      Inguinal hernia  left 2 years ago                Social History: No smoking. No alcohol. No illegal drug use.          MEDICATIONS  (STANDING):  amLODIPine   Tablet 2.5 milliGRAM(s) Oral daily  atorvastatin 10 milliGRAM(s) Oral at bedtime  ferrous    sulfate 325 milliGRAM(s) Oral daily  lactated ringers. 1000 milliLiter(s) (75 mL/Hr) IV Continuous <Continuous>  OLANZapine 2.5 milliGRAM(s) Oral at bedtime  pantoprazole    Tablet 40 milliGRAM(s) Oral before breakfast  PARoxetine 40 milliGRAM(s) Oral daily  tamsulosin 0.4 milliGRAM(s) Oral at bedtime    MEDICATIONS  (PRN):  acetaminophen     Tablet .. 650 milliGRAM(s) Oral every 6 hours PRN Temp greater or equal to 38C (100.4F), Mild Pain (1 - 3)       Allergies:   No Known Allergies  Intolerances          REVIEW OF SYSTEMS:  General:  No weight loss, fevers, or chills.  Eyes:  No reported pain or visual changes  ENT:  No sore throat or runny nose.  NECK: No stiffness or lymphadenopathy  CV:  No chest pain or palpitations.  Resp:  No shortness of breath, cough, wheezing or hemoptysis  GI:  No abdominal pain, nausea, vomiting, dysphagia, diarrhea or constipation. No rectal bleeding, melena, or hematemesis.  Neuro:  No tingling, numbness         VITAL SIGNS:   T(F): 97.4 (01-04-24 @ 05:31), Max: 97.4 (01-04-24 @ 05:31)  HR: 95 (01-04-24 @ 05:31) (60 - 95)  BP: 168/60 (01-04-24 @ 05:31) (128/71 - 168/60)  RR: 18 (01-04-24 @ 05:31) (18 - 18)  SpO2: --    PHYSICAL EXAM:  GENERAL: AAOx2 to person and place not time, no acute distress.  HEAD:  Atraumatic, Normocephalic  EYES: conjunctiva and sclera clear  NECK: Supple, No thyromegaly   CHEST/LUNG: Clear to auscultation bilaterally; No wheeze, rhonchi, or rales  HEART: Regular rate and rhythm; normal S1, S2, No murmurs.  ABDOMEN: Soft, nontender, nondistended; Bowel sounds present  NEUROLOGY: No asterixis or tremor  SKIN: Intact, no jaundice patient with ecchymosis on back, b/l on gluteal area and perianal areas          LABS:                        8.0    8.45  )-----------( 248      ( 04 Jan 2024 08:31 )             23.8     01-04    140  |  106  |  36<H>  ----------------------------<  99  4.2   |  23  |  1.2    Ca    7.8<L>      04 Jan 2024 08:31    TPro  5.1<L>  /  Alb  3.2<L>  /  TBili  1.3<H>  /  DBili  x   /  AST  38  /  ALT  30  /  AlkPhos  107  01-04    LIVER FUNCTIONS - ( 04 Jan 2024 08:31 )  Alb: 3.2 g/dL / Pro: 5.1 g/dL / ALK PHOS: 107 U/L / ALT: 30 U/L / AST: 38 U/L / GGT: x           PT/INR - ( 02 Jan 2024 17:00 )   PT: 11.70 sec;   INR: 1.03 ratio         PTT - ( 02 Jan 2024 17:00 )  PTT:26.8 sec    IMAGING:    < from: CT Abdomen and Pelvis w/ IV Cont (01.02.24 @ 17:37) >    ACC: [id number] EXAM:  CT ABDOMEN AND PELVIS IC   ORDERED BY: DAVID FERRER     ACC: [id number] EXAM:  CT CHEST IC   ORDERED BY: DAVID FERRER     PROCEDURE DATE:  01/02/2024          INTERPRETATION:  REASON FOR EXAM / CLINICAL STATEMENT:  Trauma Code.   Multiple falls. AMS. Confusion. WBC 15.82   PMHx of HTN, DL, BPH,   depressive psychosis (olanzapine)      TECHNIQUE:  Contiguous axial CT images were obtained from the thoracic   inlet through the pubic symphysis with IV contrast. IV contrast was   employed (Omnipaque 350); 95 ml was administered and 5 ml was discarded.   Reformatted images in the coronal and sagittal planes were acquired.      COMPARISON CT: None    OTHER STUDIES USED FOR CORRELATION: None.      FINDINGS / CHEST:    TUBESAND LINES: None.    HEART AND VESSELS: The heart is normal in size. There is no pericardial   effusion.    There is no evidence of central pulmonary embolism.    Normal caliber aorta and pulmonary artery. Aortic calcifications are   noted. There is no evidence of aortic aneurysm or dissection.    Brachiocephalic artery calcification, .    MEDIASTINUM: There are no suspicious mediastinal, hilar or axillary lymph   nodes. The visualized portion of the thyroid gland is unremarkable.    AIRWAYS, LUNGS AND PLEURA: The central tracheobronchial tree is patent.   There are mild dependent atelectatic changes at the lung bases.  There is   no pleural effusion. Bilateral apical pleural calcifications. There is no   pneumothorax.    BONES AND SOFT TISSUES: Degenerative changes of the spine.      FINDINGS / ABDOMEN AND PELVIS:      HEPATIC: The liver is normal in size with no evidence of solid mass or   bile duct dilatation. Hepatic steatosis is noted. The portal vein is   patent. The hepatic veins are opacified.    BILIARY: No calcified gallstones are noted.    SPLEEN: Unremarkable.    PANCREAS: The pancreas is normal in size and configuration. No evidence   of mass or pancreatitis.    ADRENAL GLANDS: Unremarkable.    KIDNEYS: There is symmetric renal enhancement. No evidence of   hydronephrosis, calcified stones, or solid mass.    ABDOMINOPELVIC NODES: Unremarkable.    PELVIC ORGANS: A right-sided hydrocele is noted in the right inguinal   canal. No evidence of pelvic mass, lymphadenopathy, or fluid collection.    BLADDER: Unremarkable.    PERITONEUM/MESENTERY/BOWEL: No evidence of bowel obstruction, colitis,   inflammatory process, or ascites. No pneumoperitoneum.    BONES/SOFT TISSUES: Degenerative changes of the spine are noted.   Compression fracture of the superior endplate of L1 of indeterminate age.    OTHER: Dense aortoiliac calcifications are noted with no evidence of   abdominal aortic aneurysm.      IMPRESSION:    No evidence of thoracic, abdominal or pelvic mass or inflammatory process.    No evidence of thoracic, abdominal or pelvic visceral injury, laceration,   or hematoma.    Compression fracture of the superior endplate of L1 of indeterminate age.    A right-sided hydrocele is noted in the right inguinal canal.    --- End of Report ---            SOO PEPE MD; Attending Interventional Radiologist  This document has been electronically signed. Jan 2 2024  7:07PM    < end of copied text >

## 2024-01-04 NOTE — PROGRESS NOTE ADULT - ASSESSMENT
74yo M PMHx  of HTN, DL, BPH, depression presents to ER with daughter c/o confusion and frequent falls, found to have NACHO and anemia.     #Multiple Falls & Confusion likely Metabolic Encephalopathy  #frequent falls   - f/u UA, infectious work up (negative)  - CT head negative for acute pathology   - pt/ot/rehab   - pt with worsening confusion as per daughter, follows Dr. Castillo as out patient (recommended decreasing v tapering off zyprexa (Psych Dr decreased dose, but does not agree it is causing symptoms)   - neuro consult appreciated    #NACHO likely pre-renal  - renal ultrasound reviewed, no hydronephrosis  PLAN  - renal on board  - LR at 75cc/hr  - cont holding losartan  - obtain UA, urine lytes, urine prot/creat ratio  - f/u serum free light chain ratio, spep/sife, upep/gary    #Acute on chronic anemia likely multifactorial anemia of chronic disease, possible avms, PUD?  - s/p 1 u pRBCs   PLAN  - GI on board  - GI will consider EGD/Colonoscopy next week after workup for confusion complete and patient at baseline and other sources of anemia ruled out given falls and ecchymosis   - Transfuse prn to hgb >7  - Two large bore IV lines  - PPI     #H/O HTN  #H/O DL  - hold losartan   - c/w home statin    #H/O BPH   - c/w tamsulosin    #H/O depressive psychosis   - c/w Paxil and Zyprexa  - pt follows Dr RESENDEZ ( via televisit but pt has had trouble with sessions and recommended in person eval)    DVT PPx: Scds   Diet:  DASH  Activity: IAT  Code: Full code    Progress Note Handoff  Pending Consults: None  Pending Tests: urine studies, blood works; possibly EGD/colonoscopy   Pending Results: clinical improvement   Family Discussion: Patient and spoke to son/HCP on the phone  Disposition: Home__Xacute; family would prefer STR___/SNF______/Other_____/Unknown at this time_____  Spent over 55 min reviewing chart, speaking with patient/family and on coordinating patient care during interdisciplinary rounds

## 2024-01-04 NOTE — PROGRESS NOTE ADULT - SUBJECTIVE AND OBJECTIVE BOX
NERY RAOY  73y  Male      Patient is a 73y old  Male who presents with a chief complaint of confusion and falls (03 Jan 2024 12:05)      INTERVAL HPI/OVERNIGHT EVENTS:  Patient seen and examined earlier this morning; patient denies any new complaints; reports he fell at home. History limited due to confusion.      REVIEW OF SYSTEMS: limited due to condition    Vital Signs Last 24 Hrs  T(C): 35.7 (04 Jan 2024 13:10), Max: 36.3 (04 Jan 2024 05:31)  T(F): 96.3 (04 Jan 2024 13:10), Max: 97.4 (04 Jan 2024 05:31)  HR: 79 (04 Jan 2024 13:10) (60 - 95)  BP: 137/42 (04 Jan 2024 13:10) (137/42 - 168/60)  BP(mean): --  RR: 18 (04 Jan 2024 13:10) (18 - 18)  SpO2: --  PHYSICAL EXAM:  SKIN: warm, dry, +Multiple areas of bruising in different stages of healing  HEAD: Normocephalic; old superficial abrasions   EYES: PERRL, EOMI, normal sclera and conjunctiva   NECK: Supple; non tender.  CARD:  Regular rate and rhythm.   RESP: CTAb/l, no w/r/r  ABD: soft ntnd, VIRGIE negative.  +Bruising to bilateral buttocks.  EXT: Normal ROM.    NEURO: AAOx2 moving all extremities  PSYCH: Cooperative, appropriate.    Consultant(s) Notes Reviewed:  [x ] YES  [ ] NO  Care Discussed with Consultants/Other Providers [ x] YES  [ ] NO    LAB:                        8.0    8.45  )-----------( 248      ( 04 Jan 2024 08:31 )             23.8   01-04    140  |  106  |  36<H>  ----------------------------<  99  4.2   |  23  |  1.2    Ca    7.8<L>      04 Jan 2024 08:31    TPro  5.1<L>  /  Alb  3.2<L>  /  TBili  1.3<H>  /  DBili  x   /  AST  38  /  ALT  30  /  AlkPhos  107  01-04            Drug Dosing Weight  Height (cm): 165.1 (03 Jan 2024 00:33)  Weight (kg): 54.7 (03 Jan 2024 00:33)  BMI (kg/m2): 20.1 (03 Jan 2024 00:33)  BSA (m2): 1.6 (03 Jan 2024 00:33)  Height (cm): 165.1 (01-03-24 @ 00:33)  Weight (kg): 54.7 (01-03-24 @ 00:33)  BMI (kg/m2): 20.1 (01-03-24 @ 00:33)  BSA (m2): 1.6 (01-03-24 @ 00:33)  CAPILLARY BLOOD GLUCOSE        I&O's Summary    Urinalysis Basic - ( 03 Jan 2024 04:30 )    Color: x / Appearance: x / SG: x / pH: x  Gluc: 87 mg/dL / Ketone: x  / Bili: x / Urobili: x   Blood: x / Protein: x / Nitrite: x   Leuk Esterase: x / RBC: x / WBC x   Sq Epi: x / Non Sq Epi: x / Bacteria: x        RADIOLOGY & ADDITIONAL TESTS:  Imaging Personally Reviewed:  [x] YES  [ ] NO    HEALTH ISSUES - PROBLEM Dx:        MEDICATIONS  (STANDING):  amLODIPine   Tablet 2.5 milliGRAM(s) Oral daily  atorvastatin 10 milliGRAM(s) Oral at bedtime  ferrous    sulfate 325 milliGRAM(s) Oral daily  lactated ringers. 1000 milliLiter(s) (75 mL/Hr) IV Continuous <Continuous>  OLANZapine 2.5 milliGRAM(s) Oral at bedtime  pantoprazole    Tablet 40 milliGRAM(s) Oral before breakfast  PARoxetine 40 milliGRAM(s) Oral daily  tamsulosin 0.4 milliGRAM(s) Oral at bedtime    MEDICATIONS  (PRN):  acetaminophen     Tablet .. 650 milliGRAM(s) Oral every 6 hours PRN Temp greater or equal to 38C (100.4F), Mild Pain (1 - 3)

## 2024-01-05 LAB
ALBUMIN SERPL ELPH-MCNC: 3.5 G/DL — SIGNIFICANT CHANGE UP (ref 3.5–5.2)
ALBUMIN SERPL ELPH-MCNC: 3.5 G/DL — SIGNIFICANT CHANGE UP (ref 3.5–5.2)
ALP SERPL-CCNC: 108 U/L — SIGNIFICANT CHANGE UP (ref 30–115)
ALP SERPL-CCNC: 108 U/L — SIGNIFICANT CHANGE UP (ref 30–115)
ALT FLD-CCNC: 28 U/L — SIGNIFICANT CHANGE UP (ref 0–41)
ALT FLD-CCNC: 28 U/L — SIGNIFICANT CHANGE UP (ref 0–41)
ANION GAP SERPL CALC-SCNC: 7 MMOL/L — SIGNIFICANT CHANGE UP (ref 7–14)
ANION GAP SERPL CALC-SCNC: 7 MMOL/L — SIGNIFICANT CHANGE UP (ref 7–14)
AST SERPL-CCNC: 28 U/L — SIGNIFICANT CHANGE UP (ref 0–41)
AST SERPL-CCNC: 28 U/L — SIGNIFICANT CHANGE UP (ref 0–41)
BILIRUB SERPL-MCNC: 1.3 MG/DL — HIGH (ref 0.2–1.2)
BILIRUB SERPL-MCNC: 1.3 MG/DL — HIGH (ref 0.2–1.2)
BUN SERPL-MCNC: 24 MG/DL — HIGH (ref 10–20)
BUN SERPL-MCNC: 24 MG/DL — HIGH (ref 10–20)
CALCIUM SERPL-MCNC: 8.4 MG/DL — SIGNIFICANT CHANGE UP (ref 8.4–10.5)
CALCIUM SERPL-MCNC: 8.4 MG/DL — SIGNIFICANT CHANGE UP (ref 8.4–10.5)
CHLORIDE SERPL-SCNC: 105 MMOL/L — SIGNIFICANT CHANGE UP (ref 98–110)
CHLORIDE SERPL-SCNC: 105 MMOL/L — SIGNIFICANT CHANGE UP (ref 98–110)
CO2 SERPL-SCNC: 27 MMOL/L — SIGNIFICANT CHANGE UP (ref 17–32)
CO2 SERPL-SCNC: 27 MMOL/L — SIGNIFICANT CHANGE UP (ref 17–32)
CREAT SERPL-MCNC: 0.9 MG/DL — SIGNIFICANT CHANGE UP (ref 0.7–1.5)
CREAT SERPL-MCNC: 0.9 MG/DL — SIGNIFICANT CHANGE UP (ref 0.7–1.5)
EGFR: 89 ML/MIN/1.73M2 — SIGNIFICANT CHANGE UP
EGFR: 89 ML/MIN/1.73M2 — SIGNIFICANT CHANGE UP
GLUCOSE SERPL-MCNC: 118 MG/DL — HIGH (ref 70–99)
GLUCOSE SERPL-MCNC: 118 MG/DL — HIGH (ref 70–99)
HCT VFR BLD CALC: 30.3 % — LOW (ref 42–52)
HCT VFR BLD CALC: 30.3 % — LOW (ref 42–52)
HGB BLD-MCNC: 10.3 G/DL — LOW (ref 14–18)
HGB BLD-MCNC: 10.3 G/DL — LOW (ref 14–18)
KAPPA LC SER QL IFE: 3.11 MG/DL — HIGH (ref 0.33–1.94)
KAPPA LC SER QL IFE: 3.11 MG/DL — HIGH (ref 0.33–1.94)
KAPPA/LAMBDA FREE LIGHT CHAIN RATIO, SERUM: 1.82 RATIO — HIGH (ref 0.26–1.65)
KAPPA/LAMBDA FREE LIGHT CHAIN RATIO, SERUM: 1.82 RATIO — HIGH (ref 0.26–1.65)
LAMBDA LC SER QL IFE: 1.71 MG/DL — SIGNIFICANT CHANGE UP (ref 0.57–2.63)
LAMBDA LC SER QL IFE: 1.71 MG/DL — SIGNIFICANT CHANGE UP (ref 0.57–2.63)
MCHC RBC-ENTMCNC: 30.7 PG — SIGNIFICANT CHANGE UP (ref 27–31)
MCHC RBC-ENTMCNC: 30.7 PG — SIGNIFICANT CHANGE UP (ref 27–31)
MCHC RBC-ENTMCNC: 34 G/DL — SIGNIFICANT CHANGE UP (ref 32–37)
MCHC RBC-ENTMCNC: 34 G/DL — SIGNIFICANT CHANGE UP (ref 32–37)
MCV RBC AUTO: 90.2 FL — SIGNIFICANT CHANGE UP (ref 80–94)
MCV RBC AUTO: 90.2 FL — SIGNIFICANT CHANGE UP (ref 80–94)
NRBC # BLD: 0 /100 WBCS — SIGNIFICANT CHANGE UP (ref 0–0)
NRBC # BLD: 0 /100 WBCS — SIGNIFICANT CHANGE UP (ref 0–0)
PLATELET # BLD AUTO: 267 K/UL — SIGNIFICANT CHANGE UP (ref 130–400)
PLATELET # BLD AUTO: 267 K/UL — SIGNIFICANT CHANGE UP (ref 130–400)
PMV BLD: 9.3 FL — SIGNIFICANT CHANGE UP (ref 7.4–10.4)
PMV BLD: 9.3 FL — SIGNIFICANT CHANGE UP (ref 7.4–10.4)
POTASSIUM SERPL-MCNC: 4.1 MMOL/L — SIGNIFICANT CHANGE UP (ref 3.5–5)
POTASSIUM SERPL-MCNC: 4.1 MMOL/L — SIGNIFICANT CHANGE UP (ref 3.5–5)
POTASSIUM SERPL-SCNC: 4.1 MMOL/L — SIGNIFICANT CHANGE UP (ref 3.5–5)
POTASSIUM SERPL-SCNC: 4.1 MMOL/L — SIGNIFICANT CHANGE UP (ref 3.5–5)
PROT ?TM UR-MCNC: 24 MG/DL — HIGH (ref 0–12)
PROT ?TM UR-MCNC: 24 MG/DL — HIGH (ref 0–12)
PROT SERPL-MCNC: 5 G/DL — LOW (ref 6–8.3)
PROT SERPL-MCNC: 5 G/DL — LOW (ref 6–8.3)
PROT SERPL-MCNC: 5.8 G/DL — LOW (ref 6–8)
PROT SERPL-MCNC: 5.8 G/DL — LOW (ref 6–8)
RBC # BLD: 3.36 M/UL — LOW (ref 4.7–6.1)
RBC # BLD: 3.36 M/UL — LOW (ref 4.7–6.1)
RBC # FLD: 14.6 % — HIGH (ref 11.5–14.5)
RBC # FLD: 14.6 % — HIGH (ref 11.5–14.5)
SODIUM SERPL-SCNC: 139 MMOL/L — SIGNIFICANT CHANGE UP (ref 135–146)
SODIUM SERPL-SCNC: 139 MMOL/L — SIGNIFICANT CHANGE UP (ref 135–146)
WBC # BLD: 9.43 K/UL — SIGNIFICANT CHANGE UP (ref 4.8–10.8)
WBC # BLD: 9.43 K/UL — SIGNIFICANT CHANGE UP (ref 4.8–10.8)
WBC # FLD AUTO: 9.43 K/UL — SIGNIFICANT CHANGE UP (ref 4.8–10.8)
WBC # FLD AUTO: 9.43 K/UL — SIGNIFICANT CHANGE UP (ref 4.8–10.8)

## 2024-01-05 PROCEDURE — 99232 SBSQ HOSP IP/OBS MODERATE 35: CPT

## 2024-01-05 PROCEDURE — 99233 SBSQ HOSP IP/OBS HIGH 50: CPT

## 2024-01-05 RX ADMIN — PANTOPRAZOLE SODIUM 40 MILLIGRAM(S): 20 TABLET, DELAYED RELEASE ORAL at 05:25

## 2024-01-05 RX ADMIN — OLANZAPINE 2.5 MILLIGRAM(S): 15 TABLET, FILM COATED ORAL at 21:20

## 2024-01-05 RX ADMIN — TAMSULOSIN HYDROCHLORIDE 0.4 MILLIGRAM(S): 0.4 CAPSULE ORAL at 21:19

## 2024-01-05 RX ADMIN — ATORVASTATIN CALCIUM 10 MILLIGRAM(S): 80 TABLET, FILM COATED ORAL at 21:19

## 2024-01-05 RX ADMIN — Medication 40 MILLIGRAM(S): at 08:41

## 2024-01-05 RX ADMIN — Medication 325 MILLIGRAM(S): at 08:41

## 2024-01-05 RX ADMIN — AMLODIPINE BESYLATE 2.5 MILLIGRAM(S): 2.5 TABLET ORAL at 05:24

## 2024-01-05 RX ADMIN — SODIUM CHLORIDE 75 MILLILITER(S): 9 INJECTION, SOLUTION INTRAVENOUS at 21:20

## 2024-01-05 NOTE — PROGRESS NOTE ADULT - SUBJECTIVE AND OBJECTIVE BOX
MATTHEW RAO  73y  Male      Patient is a 73y old  Male who presents with a chief complaint of confusion and falls (03 Jan 2024 12:05)      INTERVAL HPI/OVERNIGHT EVENTS:  Patient seen and examined earlier this morning; patient denies any new complaints; son at bedside; history limited due to confusion.      REVIEW OF SYSTEMS: limited due to condition    Vital Signs Last 24 Hrs  T(C): 36 (05 Jan 2024 04:34), Max: 36.1 (04 Jan 2024 20:47)  T(F): 96.8 (05 Jan 2024 04:34), Max: 96.9 (04 Jan 2024 20:47)  HR: 74 (05 Jan 2024 04:34) (71 - 74)  BP: 170/70 (05 Jan 2024 04:34) (170/70 - 185/75)  BP(mean): --  RR: 18 (05 Jan 2024 04:34) (18 - 18)  SpO2: --  PHYSICAL EXAM:  SKIN: warm, dry, +Multiple areas of bruising in different stages of healing  HEAD: Normocephalic; old superficial abrasions   EYES: PERRL, EOMI, normal sclera and conjunctiva   NECK: Supple; non tender.  CARD:  Regular rate and rhythm.   RESP: CTAb/l, no w/r/r  ABD: soft ntnd, VIRGIE negative.  +Bruising to bilateral buttocks.  EXT: Normal ROM.    NEURO: AAOx2 moving all extremities  PSYCH: Cooperative, appropriate.    Consultant(s) Notes Reviewed:  [x ] YES  [ ] NO  Care Discussed with Consultants/Other Providers [ x] YES  [ ] NO    LAB:                        10.3   9.43  )-----------( 267      ( 05 Jan 2024 10:01 )             30.3   01-05    139  |  105  |  24<H>  ----------------------------<  118<H>  4.1   |  27  |  0.9    Ca    8.4      05 Jan 2024 10:01    TPro  5.8<L>  /  Alb  3.5  /  TBili  1.3<H>  /  DBili  x   /  AST  28  /  ALT  28  /  AlkPhos  108  01-05        Drug Dosing Weight  Height (cm): 165.1 (03 Jan 2024 00:33)  Weight (kg): 54.7 (03 Jan 2024 00:33)  BMI (kg/m2): 20.1 (03 Jan 2024 00:33)  BSA (m2): 1.6 (03 Jan 2024 00:33)  Height (cm): 165.1 (01-03-24 @ 00:33)  Weight (kg): 54.7 (01-03-24 @ 00:33)  BMI (kg/m2): 20.1 (01-03-24 @ 00:33)  BSA (m2): 1.6 (01-03-24 @ 00:33)  CAPILLARY BLOOD GLUCOSE        I&O's Summary    Urinalysis Basic - ( 03 Jan 2024 04:30 )    Color: x / Appearance: x / SG: x / pH: x  Gluc: 87 mg/dL / Ketone: x  / Bili: x / Urobili: x   Blood: x / Protein: x / Nitrite: x   Leuk Esterase: x / RBC: x / WBC x   Sq Epi: x / Non Sq Epi: x / Bacteria: x        RADIOLOGY & ADDITIONAL TESTS:  Imaging Personally Reviewed:  [x] YES  [ ] NO    HEALTH ISSUES - PROBLEM Dx:        MEDICATIONS  (STANDING):  amLODIPine   Tablet 2.5 milliGRAM(s) Oral daily  atorvastatin 10 milliGRAM(s) Oral at bedtime  ferrous    sulfate 325 milliGRAM(s) Oral daily  lactated ringers. 1000 milliLiter(s) (75 mL/Hr) IV Continuous <Continuous>  OLANZapine 2.5 milliGRAM(s) Oral at bedtime  pantoprazole    Tablet 40 milliGRAM(s) Oral before breakfast  PARoxetine 40 milliGRAM(s) Oral daily  tamsulosin 0.4 milliGRAM(s) Oral at bedtime    MEDICATIONS  (PRN):  acetaminophen     Tablet .. 650 milliGRAM(s) Oral every 6 hours PRN Temp greater or equal to 38C (100.4F), Mild Pain (1 - 3)

## 2024-01-05 NOTE — PROGRESS NOTE ADULT - SUBJECTIVE AND OBJECTIVE BOX
73yMale  Being followed for anemia no gross GI bleeding   Interval history:  No hematemesis, melena, blood in stool reported.       PAST MEDICAL & SURGICAL HISTORY:   Hypertension      BPH (benign prostatic hyperplasia)      Skin cancer      Anxiety      Depression      Hypercholesteremia      Status post Mohs surgery      Inguinal hernia  left 2 years ago                Social History: No smoking. No alcohol. No illegal drug use.        MEDICATIONS  (STANDING):  amLODIPine   Tablet 2.5 milliGRAM(s) Oral daily  atorvastatin 10 milliGRAM(s) Oral at bedtime  ferrous    sulfate 325 milliGRAM(s) Oral daily  lactated ringers. 1000 milliLiter(s) (75 mL/Hr) IV Continuous <Continuous>  OLANZapine 2.5 milliGRAM(s) Oral at bedtime  pantoprazole    Tablet 40 milliGRAM(s) Oral before breakfast  PARoxetine 40 milliGRAM(s) Oral daily  tamsulosin 0.4 milliGRAM(s) Oral at bedtime    MEDICATIONS  (PRN):  acetaminophen     Tablet .. 650 milliGRAM(s) Oral every 6 hours PRN Temp greater or equal to 38C (100.4F), Mild Pain (1 - 3)      Allergies:  No Known Allergies              REVIEW OF SYSTEMS:  General:  No weight loss, fevers, or chills.  Eyes:  No reported pain or visual changes  ENT:  No sore throat or runny nose.  NECK: No stiffness   CV:  No chest pain or palpitations.  Resp:  No shortness of breath, cough  GI:  No abdominal pain, nausea, vomiting, dysphagia, diarrhea or constipation. No rectal bleeding, melena, or hematemesis.  Neuro:  No tingling, numbness           VITAL SIGNS:   T(F): 96.8 (01-05-24 @ 04:34), Max: 96.9 (01-04-24 @ 20:47)  HR: 74 (01-05-24 @ 04:34) (71 - 79)  BP: 170/70 (01-05-24 @ 04:34) (137/42 - 185/75)  RR: 18 (01-05-24 @ 04:34) (18 - 18)  SpO2: --    PHYSICAL EXAM:  GENERAL: AAOx3, no acute distress.  HEAD:  Atraumatic, Normocephalic  EYES: conjunctiva and sclera clear  NECK: Supple, no JVD or thyromegaly  CHEST/LUNG: Clear to auscultation bilaterally; No wheeze, rhonchi, or rales  HEART: Regular rate and rhythm; normal S1, S2, No murmurs.  ABDOMEN: Soft, nontender, nondistended; Bowel sounds present  NEUROLOGY: No asterixis or tremor.   SKIN: Intact, no jaundice            LABS:                        10.3   9.43  )-----------( 267      ( 05 Jan 2024 10:01 )             30.3     01-05    139  |  105  |  24<H>  ----------------------------<  118<H>  4.1   |  27  |  0.9    Ca    8.4      05 Jan 2024 10:01    TPro  5.8<L>  /  Alb  3.5  /  TBili  1.3<H>  /  DBili  x   /  AST  28  /  ALT  28  /  AlkPhos  108  01-05    LIVER FUNCTIONS - ( 05 Jan 2024 10:01 )  Alb: 3.5 g/dL / Pro: 5.8 g/dL / ALK PHOS: 108 U/L / ALT: 28 U/L / AST: 28 U/L / GGT: x               IMAGING:    < from: CT Abdomen and Pelvis w/ IV Cont (01.02.24 @ 17:37) >    ACC: [id number] EXAM:  CT ABDOMEN AND PELVIS IC   ORDERED BY: DVAID FERRER     ACC: [id number] EXAM:  CT CHEST IC   ORDERED BY: DAVID FERRER     PROCEDURE DATE:  01/02/2024          INTERPRETATION:  REASON FOR EXAM / CLINICAL STATEMENT:  Trauma Code.   Multiple falls. AMS. Confusion. WBC 15.82   PMHx of HTN, DL, BPH,   depressive psychosis (olanzapine)      TECHNIQUE:  Contiguous axial CT images were obtained from the thoracic   inlet through the pubic symphysis with IV contrast. IV contrast was   employed (Omnipaque 350); 95 ml was administered and 5 ml was discarded.   Reformatted images in the coronal and sagittal planes were acquired.      COMPARISON CT: None    OTHER STUDIES USED FOR CORRELATION: None.      FINDINGS / CHEST:    TUBESAND LINES: None.    HEART AND VESSELS: The heart is normal in size. There is no pericardial   effusion.    There is no evidence of central pulmonary embolism.    Normal caliber aorta and pulmonary artery. Aortic calcifications are   noted. There is no evidence of aortic aneurysm or dissection.    Brachiocephalic artery calcification, .    MEDIASTINUM: There are no suspicious mediastinal, hilar or axillary lymph   nodes. The visualized portion of the thyroid gland is unremarkable.    AIRWAYS, LUNGS AND PLEURA: The central tracheobronchial tree is patent.   There are mild dependent atelectatic changes at the lung bases.  There is   no pleural effusion. Bilateral apical pleural calcifications. There is no   pneumothorax.    BONES AND SOFT TISSUES: Degenerative changes of the spine.      FINDINGS / ABDOMEN AND PELVIS:      HEPATIC: The liver is normal in size with no evidence of solid mass or   bile duct dilatation. Hepatic steatosis is noted. The portal vein is   patent. The hepatic veins are opacified.    BILIARY: No calcified gallstones are noted.    SPLEEN: Unremarkable.    PANCREAS: The pancreas is normal in size and configuration. No evidence   of mass or pancreatitis.    ADRENAL GLANDS: Unremarkable.    KIDNEYS: There is symmetric renal enhancement. No evidence of   hydronephrosis, calcified stones, or solid mass.    ABDOMINOPELVIC NODES: Unremarkable.    PELVIC ORGANS: A right-sided hydrocele is noted in the right inguinal   canal. No evidence of pelvic mass, lymphadenopathy, or fluid collection.    BLADDER: Unremarkable.    PERITONEUM/MESENTERY/BOWEL: No evidence of bowel obstruction, colitis,   inflammatory process, or ascites. No pneumoperitoneum.    BONES/SOFT TISSUES: Degenerative changes of the spine are noted.   Compression fracture of the superior endplate of L1 of indeterminate age.    OTHER: Dense aortoiliac calcifications are noted with no evidence of   abdominal aortic aneurysm.      IMPRESSION:    No evidence of thoracic, abdominal or pelvic mass or inflammatory process.    No evidence of thoracic, abdominal or pelvic visceral injury, laceration,   or hematoma.    Compression fracture of the superior endplate of L1 of indeterminate age.    A right-sided hydrocele is noted in the right inguinal canal.    --- End of Report ---            SOO PEPE MD; Attending Interventional Radiologist  This document has been electronically signed. Jan 2 2024  7:07PM    < end of copied text >         73yMale  Being followed for anemia no gross GI bleeding   Interval history:  No hematemesis, melena, blood in stool reported.       PAST MEDICAL & SURGICAL HISTORY:   Hypertension      BPH (benign prostatic hyperplasia)      Skin cancer      Anxiety      Depression      Hypercholesteremia      Status post Mohs surgery      Inguinal hernia  left 2 years ago                Social History: No smoking. No alcohol. No illegal drug use.        MEDICATIONS  (STANDING):  amLODIPine   Tablet 2.5 milliGRAM(s) Oral daily  atorvastatin 10 milliGRAM(s) Oral at bedtime  ferrous    sulfate 325 milliGRAM(s) Oral daily  lactated ringers. 1000 milliLiter(s) (75 mL/Hr) IV Continuous <Continuous>  OLANZapine 2.5 milliGRAM(s) Oral at bedtime  pantoprazole    Tablet 40 milliGRAM(s) Oral before breakfast  PARoxetine 40 milliGRAM(s) Oral daily  tamsulosin 0.4 milliGRAM(s) Oral at bedtime    MEDICATIONS  (PRN):  acetaminophen     Tablet .. 650 milliGRAM(s) Oral every 6 hours PRN Temp greater or equal to 38C (100.4F), Mild Pain (1 - 3)      Allergies:  No Known Allergies              REVIEW OF SYSTEMS:  General:  No weight loss, fevers, or chills.  Eyes:  No reported pain or visual changes  ENT:  No sore throat or runny nose.  NECK: No stiffness   CV:  No chest pain or palpitations.  Resp:  No shortness of breath, cough  GI:  No abdominal pain, nausea, vomiting, dysphagia, diarrhea or constipation. No rectal bleeding, melena, or hematemesis.  Neuro:  No tingling, numbness           VITAL SIGNS:   T(F): 96.8 (01-05-24 @ 04:34), Max: 96.9 (01-04-24 @ 20:47)  HR: 74 (01-05-24 @ 04:34) (71 - 79)  BP: 170/70 (01-05-24 @ 04:34) (137/42 - 185/75)  RR: 18 (01-05-24 @ 04:34) (18 - 18)  SpO2: --    PHYSICAL EXAM:  GENERAL: AAOx3, no acute distress.  HEAD:  Atraumatic, Normocephalic  EYES: conjunctiva and sclera clear  NECK: Supple, no JVD or thyromegaly  CHEST/LUNG: Clear to auscultation bilaterally; No wheeze, rhonchi, or rales  HEART: Regular rate and rhythm; normal S1, S2, No murmurs.  ABDOMEN: Soft, nontender, nondistended; Bowel sounds present  NEUROLOGY: No asterixis or tremor.   SKIN: Intact, no jaundice            LABS:                        10.3   9.43  )-----------( 267      ( 05 Jan 2024 10:01 )             30.3     01-05    139  |  105  |  24<H>  ----------------------------<  118<H>  4.1   |  27  |  0.9    Ca    8.4      05 Jan 2024 10:01    TPro  5.8<L>  /  Alb  3.5  /  TBili  1.3<H>  /  DBili  x   /  AST  28  /  ALT  28  /  AlkPhos  108  01-05    LIVER FUNCTIONS - ( 05 Jan 2024 10:01 )  Alb: 3.5 g/dL / Pro: 5.8 g/dL / ALK PHOS: 108 U/L / ALT: 28 U/L / AST: 28 U/L / GGT: x               IMAGING:    < from: CT Abdomen and Pelvis w/ IV Cont (01.02.24 @ 17:37) >    ACC: [id number] EXAM:  CT ABDOMEN AND PELVIS IC   ORDERED BY: DAVID FERRER     ACC: [id number] EXAM:  CT CHEST IC   ORDERED BY: DAVID FERRER     PROCEDURE DATE:  01/02/2024          INTERPRETATION:  REASON FOR EXAM / CLINICAL STATEMENT:  Trauma Code.   Multiple falls. AMS. Confusion. WBC 15.82   PMHx of HTN, DL, BPH,   depressive psychosis (olanzapine)      TECHNIQUE:  Contiguous axial CT images were obtained from the thoracic   inlet through the pubic symphysis with IV contrast. IV contrast was   employed (Omnipaque 350); 95 ml was administered and 5 ml was discarded.   Reformatted images in the coronal and sagittal planes were acquired.      COMPARISON CT: None    OTHER STUDIES USED FOR CORRELATION: None.      FINDINGS / CHEST:    TUBESAND LINES: None.    HEART AND VESSELS: The heart is normal in size. There is no pericardial   effusion.    There is no evidence of central pulmonary embolism.    Normal caliber aorta and pulmonary artery. Aortic calcifications are   noted. There is no evidence of aortic aneurysm or dissection.    Brachiocephalic artery calcification, .    MEDIASTINUM: There are no suspicious mediastinal, hilar or axillary lymph   nodes. The visualized portion of the thyroid gland is unremarkable.    AIRWAYS, LUNGS AND PLEURA: The central tracheobronchial tree is patent.   There are mild dependent atelectatic changes at the lung bases.  There is   no pleural effusion. Bilateral apical pleural calcifications. There is no   pneumothorax.    BONES AND SOFT TISSUES: Degenerative changes of the spine.      FINDINGS / ABDOMEN AND PELVIS:      HEPATIC: The liver is normal in size with no evidence of solid mass or   bile duct dilatation. Hepatic steatosis is noted. The portal vein is   patent. The hepatic veins are opacified.    BILIARY: No calcified gallstones are noted.    SPLEEN: Unremarkable.    PANCREAS: The pancreas is normal in size and configuration. No evidence   of mass or pancreatitis.    ADRENAL GLANDS: Unremarkable.    KIDNEYS: There is symmetric renal enhancement. No evidence of   hydronephrosis, calcified stones, or solid mass.    ABDOMINOPELVIC NODES: Unremarkable.    PELVIC ORGANS: A right-sided hydrocele is noted in the right inguinal   canal. No evidence of pelvic mass, lymphadenopathy, or fluid collection.    BLADDER: Unremarkable.    PERITONEUM/MESENTERY/BOWEL: No evidence of bowel obstruction, colitis,   inflammatory process, or ascites. No pneumoperitoneum.    BONES/SOFT TISSUES: Degenerative changes of the spine are noted.   Compression fracture of the superior endplate of L1 of indeterminate age.    OTHER: Dense aortoiliac calcifications are noted with no evidence of   abdominal aortic aneurysm.      IMPRESSION:    No evidence of thoracic, abdominal or pelvic mass or inflammatory process.    No evidence of thoracic, abdominal or pelvic visceral injury, laceration,   or hematoma.    Compression fracture of the superior endplate of L1 of indeterminate age.    A right-sided hydrocele is noted in the right inguinal canal.    --- End of Report ---            SOO PEPE MD; Attending Interventional Radiologist  This document has been electronically signed. Jan 2 2024  7:07PM    < end of copied text >         73yMale  Being followed for anemia no gross GI bleeding   Interval history:  No hematemesis, melena, blood in stool reported.       PAST MEDICAL & SURGICAL HISTORY:   Hypertension      BPH (benign prostatic hyperplasia)      Skin cancer      Anxiety      Depression      Hypercholesteremia      Status post Mohs surgery      Inguinal hernia  left 2 years ago                Social History: No smoking. No alcohol. No illegal drug use.        MEDICATIONS  (STANDING):  amLODIPine   Tablet 2.5 milliGRAM(s) Oral daily  atorvastatin 10 milliGRAM(s) Oral at bedtime  ferrous    sulfate 325 milliGRAM(s) Oral daily  lactated ringers. 1000 milliLiter(s) (75 mL/Hr) IV Continuous <Continuous>  OLANZapine 2.5 milliGRAM(s) Oral at bedtime  pantoprazole    Tablet 40 milliGRAM(s) Oral before breakfast  PARoxetine 40 milliGRAM(s) Oral daily  tamsulosin 0.4 milliGRAM(s) Oral at bedtime    MEDICATIONS  (PRN):  acetaminophen     Tablet .. 650 milliGRAM(s) Oral every 6 hours PRN Temp greater or equal to 38C (100.4F), Mild Pain (1 - 3)      Allergies:  No Known Allergies              REVIEW OF SYSTEMS:  General:  No weight loss, fevers, or chills.  Eyes:  No reported pain or visual changes  ENT:  No sore throat or runny nose.  NECK: No stiffness   CV:  No chest pain or palpitations.  Resp:  No shortness of breath, cough  GI:  No abdominal pain, nausea, vomiting, dysphagia, diarrhea or constipation. No rectal bleeding, melena, or hematemesis.  Neuro:  No tingling, numbness           VITAL SIGNS:   T(F): 96.8 (01-05-24 @ 04:34), Max: 96.9 (01-04-24 @ 20:47)  HR: 74 (01-05-24 @ 04:34) (71 - 79)  BP: 170/70 (01-05-24 @ 04:34) (137/42 - 185/75)  RR: 18 (01-05-24 @ 04:34) (18 - 18)  SpO2: --    PHYSICAL EXAM:  GENERAL: AAOx2 to person and place not time, no acute distress.  HEAD:  Atraumatic, Normocephalic  EYES: conjunctiva and sclera clear  NECK: Supple, No thyromegaly   CHEST/LUNG: Clear to auscultation bilaterally; No wheeze, rhonchi, or rales  HEART: Regular rate and rhythm; normal S1, S2, No murmurs.  ABDOMEN: Soft, nontender, nondistended; Bowel sounds present  NEUROLOGY: No asterixis or tremor  SKIN: Intact, no jaundice patient with ecchymosis on back, b/l on gluteal area and perianal areas            LABS:                        10.3   9.43  )-----------( 267      ( 05 Jan 2024 10:01 )             30.3     01-05    139  |  105  |  24<H>  ----------------------------<  118<H>  4.1   |  27  |  0.9    Ca    8.4      05 Jan 2024 10:01    TPro  5.8<L>  /  Alb  3.5  /  TBili  1.3<H>  /  DBili  x   /  AST  28  /  ALT  28  /  AlkPhos  108  01-05    LIVER FUNCTIONS - ( 05 Jan 2024 10:01 )  Alb: 3.5 g/dL / Pro: 5.8 g/dL / ALK PHOS: 108 U/L / ALT: 28 U/L / AST: 28 U/L / GGT: x               IMAGING:    < from: CT Abdomen and Pelvis w/ IV Cont (01.02.24 @ 17:37) >    ACC: [id number] EXAM:  CT ABDOMEN AND PELVIS IC   ORDERED BY: DAVID FERRER     ACC: [id number] EXAM:  CT CHEST IC   ORDERED BY: DAVID FERRER     PROCEDURE DATE:  01/02/2024          INTERPRETATION:  REASON FOR EXAM / CLINICAL STATEMENT:  Trauma Code.   Multiple falls. AMS. Confusion. WBC 15.82   PMHx of HTN, DL, BPH,   depressive psychosis (olanzapine)      TECHNIQUE:  Contiguous axial CT images were obtained from the thoracic   inlet through the pubic symphysis with IV contrast. IV contrast was   employed (Omnipaque 350); 95 ml was administered and 5 ml was discarded.   Reformatted images in the coronal and sagittal planes were acquired.      COMPARISON CT: None    OTHER STUDIES USED FOR CORRELATION: None.      FINDINGS / CHEST:    TUBESAND LINES: None.    HEART AND VESSELS: The heart is normal in size. There is no pericardial   effusion.    There is no evidence of central pulmonary embolism.    Normal caliber aorta and pulmonary artery. Aortic calcifications are   noted. There is no evidence of aortic aneurysm or dissection.    Brachiocephalic artery calcification, .    MEDIASTINUM: There are no suspicious mediastinal, hilar or axillary lymph   nodes. The visualized portion of the thyroid gland is unremarkable.    AIRWAYS, LUNGS AND PLEURA: The central tracheobronchial tree is patent.   There are mild dependent atelectatic changes at the lung bases.  There is   no pleural effusion. Bilateral apical pleural calcifications. There is no   pneumothorax.    BONES AND SOFT TISSUES: Degenerative changes of the spine.      FINDINGS / ABDOMEN AND PELVIS:      HEPATIC: The liver is normal in size with no evidence of solid mass or   bile duct dilatation. Hepatic steatosis is noted. The portal vein is   patent. The hepatic veins are opacified.    BILIARY: No calcified gallstones are noted.    SPLEEN: Unremarkable.    PANCREAS: The pancreas is normal in size and configuration. No evidence   of mass or pancreatitis.    ADRENAL GLANDS: Unremarkable.    KIDNEYS: There is symmetric renal enhancement. No evidence of   hydronephrosis, calcified stones, or solid mass.    ABDOMINOPELVIC NODES: Unremarkable.    PELVIC ORGANS: A right-sided hydrocele is noted in the right inguinal   canal. No evidence of pelvic mass, lymphadenopathy, or fluid collection.    BLADDER: Unremarkable.    PERITONEUM/MESENTERY/BOWEL: No evidence of bowel obstruction, colitis,   inflammatory process, or ascites. No pneumoperitoneum.    BONES/SOFT TISSUES: Degenerative changes of the spine are noted.   Compression fracture of the superior endplate of L1 of indeterminate age.    OTHER: Dense aortoiliac calcifications are noted with no evidence of   abdominal aortic aneurysm.      IMPRESSION:    No evidence of thoracic, abdominal or pelvic mass or inflammatory process.    No evidence of thoracic, abdominal or pelvic visceral injury, laceration,   or hematoma.    Compression fracture of the superior endplate of L1 of indeterminate age.    A right-sided hydrocele is noted in the right inguinal canal.    --- End of Report ---            SOO PEPE MD; Attending Interventional Radiologist  This document has been electronically signed. Jan 2 2024  7:07PM    < end of copied text >         73yMale  Being followed for anemia no gross GI bleeding   Interval history:  No hematemesis, melena, blood in stool reported.       PAST MEDICAL & SURGICAL HISTORY:   Hypertension      BPH (benign prostatic hyperplasia)      Skin cancer      Anxiety      Depression      Hypercholesteremia      Status post Mohs surgery      Inguinal hernia  left 2 years ago                Social History: No smoking. No alcohol. No illegal drug use.        MEDICATIONS  (STANDING):  amLODIPine   Tablet 2.5 milliGRAM(s) Oral daily  atorvastatin 10 milliGRAM(s) Oral at bedtime  ferrous    sulfate 325 milliGRAM(s) Oral daily  lactated ringers. 1000 milliLiter(s) (75 mL/Hr) IV Continuous <Continuous>  OLANZapine 2.5 milliGRAM(s) Oral at bedtime  pantoprazole    Tablet 40 milliGRAM(s) Oral before breakfast  PARoxetine 40 milliGRAM(s) Oral daily  tamsulosin 0.4 milliGRAM(s) Oral at bedtime    MEDICATIONS  (PRN):  acetaminophen     Tablet .. 650 milliGRAM(s) Oral every 6 hours PRN Temp greater or equal to 38C (100.4F), Mild Pain (1 - 3)      Allergies:  No Known Allergies              REVIEW OF SYSTEMS:  General:  No weight loss, fevers, or chills.  Eyes:  No reported pain or visual changes  ENT:  No sore throat or runny nose.  NECK: No stiffness   CV:  No chest pain or palpitations.  Resp:  No shortness of breath, cough  GI:  No abdominal pain, nausea, vomiting, dysphagia, diarrhea or constipation. No rectal bleeding, melena, or hematemesis.  Neuro:  No tingling, numbness           VITAL SIGNS:   T(F): 96.8 (01-05-24 @ 04:34), Max: 96.9 (01-04-24 @ 20:47)  HR: 74 (01-05-24 @ 04:34) (71 - 79)  BP: 170/70 (01-05-24 @ 04:34) (137/42 - 185/75)  RR: 18 (01-05-24 @ 04:34) (18 - 18)  SpO2: --    PHYSICAL EXAM:  GENERAL: AAOx2 to person and place not time, no acute distress.  HEAD:  Atraumatic, Normocephalic  EYES: conjunctiva and sclera clear  NECK: Supple, No thyromegaly   CHEST/LUNG: Clear to auscultation bilaterally; No wheeze, rhonchi, or rales  HEART: Regular rate and rhythm; normal S1, S2, No murmurs.  ABDOMEN: Soft, nontender, nondistended; Bowel sounds present  NEUROLOGY: No asterixis or tremor  SKIN: Intact, no jaundice patient with ecchymosis on back, b/l on gluteal area and perianal areas            LABS:                        10.3   9.43  )-----------( 267      ( 05 Jan 2024 10:01 )             30.3     01-05    139  |  105  |  24<H>  ----------------------------<  118<H>  4.1   |  27  |  0.9    Ca    8.4      05 Jan 2024 10:01    TPro  5.8<L>  /  Alb  3.5  /  TBili  1.3<H>  /  DBili  x   /  AST  28  /  ALT  28  /  AlkPhos  108  01-05    LIVER FUNCTIONS - ( 05 Jan 2024 10:01 )  Alb: 3.5 g/dL / Pro: 5.8 g/dL / ALK PHOS: 108 U/L / ALT: 28 U/L / AST: 28 U/L / GGT: x               IMAGING:    < from: CT Abdomen and Pelvis w/ IV Cont (01.02.24 @ 17:37) >    ACC: [id number] EXAM:  CT ABDOMEN AND PELVIS IC   ORDERED BY: DAVID FERRER     ACC: [id number] EXAM:  CT CHEST IC   ORDERED BY: DAVID FERRER     PROCEDURE DATE:  01/02/2024          INTERPRETATION:  REASON FOR EXAM / CLINICAL STATEMENT:  Trauma Code.   Multiple falls. AMS. Confusion. WBC 15.82   PMHx of HTN, DL, BPH,   depressive psychosis (olanzapine)      TECHNIQUE:  Contiguous axial CT images were obtained from the thoracic   inlet through the pubic symphysis with IV contrast. IV contrast was   employed (Omnipaque 350); 95 ml was administered and 5 ml was discarded.   Reformatted images in the coronal and sagittal planes were acquired.      COMPARISON CT: None    OTHER STUDIES USED FOR CORRELATION: None.      FINDINGS / CHEST:    TUBESAND LINES: None.    HEART AND VESSELS: The heart is normal in size. There is no pericardial   effusion.    There is no evidence of central pulmonary embolism.    Normal caliber aorta and pulmonary artery. Aortic calcifications are   noted. There is no evidence of aortic aneurysm or dissection.    Brachiocephalic artery calcification, .    MEDIASTINUM: There are no suspicious mediastinal, hilar or axillary lymph   nodes. The visualized portion of the thyroid gland is unremarkable.    AIRWAYS, LUNGS AND PLEURA: The central tracheobronchial tree is patent.   There are mild dependent atelectatic changes at the lung bases.  There is   no pleural effusion. Bilateral apical pleural calcifications. There is no   pneumothorax.    BONES AND SOFT TISSUES: Degenerative changes of the spine.      FINDINGS / ABDOMEN AND PELVIS:      HEPATIC: The liver is normal in size with no evidence of solid mass or   bile duct dilatation. Hepatic steatosis is noted. The portal vein is   patent. The hepatic veins are opacified.    BILIARY: No calcified gallstones are noted.    SPLEEN: Unremarkable.    PANCREAS: The pancreas is normal in size and configuration. No evidence   of mass or pancreatitis.    ADRENAL GLANDS: Unremarkable.    KIDNEYS: There is symmetric renal enhancement. No evidence of   hydronephrosis, calcified stones, or solid mass.    ABDOMINOPELVIC NODES: Unremarkable.    PELVIC ORGANS: A right-sided hydrocele is noted in the right inguinal   canal. No evidence of pelvic mass, lymphadenopathy, or fluid collection.    BLADDER: Unremarkable.    PERITONEUM/MESENTERY/BOWEL: No evidence of bowel obstruction, colitis,   inflammatory process, or ascites. No pneumoperitoneum.    BONES/SOFT TISSUES: Degenerative changes of the spine are noted.   Compression fracture of the superior endplate of L1 of indeterminate age.    OTHER: Dense aortoiliac calcifications are noted with no evidence of   abdominal aortic aneurysm.      IMPRESSION:    No evidence of thoracic, abdominal or pelvic mass or inflammatory process.    No evidence of thoracic, abdominal or pelvic visceral injury, laceration,   or hematoma.    Compression fracture of the superior endplate of L1 of indeterminate age.    A right-sided hydrocele is noted in the right inguinal canal.    --- End of Report ---            SOO PEPE MD; Attending Interventional Radiologist  This document has been electronically signed. Jan 2 2024  7:07PM    < end of copied text >

## 2024-01-05 NOTE — PROGRESS NOTE ADULT - ASSESSMENT
73yMale pmh HTN, DL, BPH presents for confusion and falls. GI consulted for anemia no gross GI bleeding. patient reports he never had a GI workup before.    #anemia no gross GI bleeding  normocytic, likely multifactorial anemia of chronic disease, possible avms, PUD?  patient with ecchymosis on back, b/l on gluteal area and perianal areas  rectal exam-reveals brown stool  Rec  -in the absence of gross GI bleeding, can plan for outpatient GI workup  -Maintain Hemodynamic Stability   -Monitor CBC  -CMP,Optimize Electrolytes  -PT,PTT,INR  -EKG, Chest-Xray   -Transfuse prn to hgb >8  -Two large bore IV lines  - PPI BID  -Monitor Vital Signs  -Monitor Stool For blood, frequency, consistency, melena  -Active Type and Screen  -Iron Studies, Folate, Vitamin B12 levels     #isolated hyperbilirubinemia  Rec  -can fractionate T-bili  -monitor LFTs    #Compression fracture of the superior endplate of L1 of indeterminate age.  Rec  - Care as per primary team     #A right-sided hydrocele is noted in the right inguinal canal  Rec  - Care as per primary team      73yMale pmh HTN, DL, BPH presents for confusion and falls. GI consulted for anemia no gross GI bleeding. patient reports he never had a GI workup before.    #anemia no gross GI bleeding  normocytic, likely multifactorial anemia of chronic disease, possible avms, PUD?  patient with ecchymosis on back, b/l on gluteal area and perianal areas  rectal exam-reveals brown stool  Rec  -in the absence of gross GI bleeding, can plan for outpatient GI workup  -Maintain Hemodynamic Stability   -Monitor CBC  -CMP,Optimize Electrolytes  -PT,PTT,INR  -EKG, Chest-Xray   -Transfuse prn to hgb >8  -Two large bore IV lines  - PPI BID  -Monitor Vital Signs  -Monitor Stool For blood, frequency, consistency, melena  -Active Type and Screen  -Iron Studies, Folate, Vitamin B12 levels   - Follow up with our [facility] located at [address] [place] [address]. Phone Number: [phone]     #isolated hyperbilirubinemia  #Hepatic steatosis is noted.  Rec  -can fractionate T-bili  -monitor LFTs   -Dietary and lifestyle modifications advised   -outpatient fibroscan   -outpatient can check acute hepatitis panel   -outpatient workup  - Follow up with our [facility] located at [address] [place] [address]. Phone Number: [phone].     #Compression fracture of the superior endplate of L1 of indeterminate age.  Rec  - Care as per primary team     #A right-sided hydrocele is noted in the right inguinal canal  Rec  - Care as per primary team      73yMale pmh HTN, DL, BPH presents for confusion and falls. GI consulted for anemia no gross GI bleeding. patient reports he never had a GI workup before.    #anemia no gross GI bleeding  normocytic, likely multifactorial anemia of chronic disease, possible avms, PUD?  patient with ecchymosis on back, b/l on gluteal area and perianal areas  rectal exam-reveals brown stool  Rec  -in the absence of gross GI bleeding, can plan for outpatient GI workup  -Maintain Hemodynamic Stability   -Monitor CBC  -CMP,Optimize Electrolytes  -PT,PTT,INR  -EKG, Chest-Xray   -Transfuse prn to hgb >8  -Two large bore IV lines  - PPI BID  -Monitor Vital Signs  -Monitor Stool For blood, frequency, consistency, melena  -Active Type and Screen  -Iron Studies, Folate, Vitamin B12 levels   - Follow up with our [facility] located at [address] [place] [address]. Phone Number: [phone]     #isolated hyperbilirubinemia  #Hepatic steatosis is noted.  Rec  -can fractionate T-bili  -monitor LFTs   -Dietary and lifestyle modifications advised   -outpatient fibroscan   -outpatient can check acute hepatitis panel   -outpatient workup  - Follow up with our [facility] located at [address] [place] [address]. Phone Number: [phone].     #Compression fracture of the superior endplate of L1 of indeterminate age.  Rec  - Care as per primary team     #A right-sided hydrocele is noted in the right inguinal canal  Rec  - Care as per primary team      73yMale pmh HTN, DL, BPH presents for confusion and falls. GI consulted for anemia no gross GI bleeding. patient reports he never had a GI workup before.    #anemia no gross GI bleeding  normocytic, likely multifactorial anemia of chronic disease, possible avms, PUD?  patient with ecchymosis on back, b/l on gluteal area and perianal areas  rectal exam-reveals brown stool  Rec  -EGD/Colonoscopy Monday  -please prep patient Sunday  Colonoscopy Prep  -NPO aftermidnight Sunday into Monday  -AM CBC, AM CMP, AM INR, PT, PTT Monday  -Go-lytely 4L Prior day before Colonoscopy Sunday  -Four tablets Dulcolax two at 2pm and two at 6pm Prior to Colonoscopy Sunday  -Clear Liquids Day, no red dye before Colonoscopy Sunday  -Maintain Hemodynamic Stability   -Monitor CBC  -CMP,Optimize Electrolytes  -PT,PTT,INR  -EKG, Chest-Xray   -Transfuse prn to hgb >8  -Two large bore IV lines  - PPI BID  -Monitor Vital Signs  -Monitor Stool For blood, frequency, consistency, melena  -Active Type and Screen  -Iron Studies, Folate, Vitamin B12 levels   - Follow up with our [facility] located at [address] [place] [address]. Phone Number: [phone]     #isolated hyperbilirubinemia  #Hepatic steatosis is noted.  Rec  -can fractionate T-bili  -monitor LFTs   -Dietary and lifestyle modifications advised   -outpatient fibroscan   -outpatient can check acute hepatitis panel   -outpatient workup  - Follow up with our [facility] located at [address] [place] [address]. Phone Number: [phone].     #Compression fracture of the superior endplate of L1 of indeterminate age.  Rec  - Care as per primary team     #A right-sided hydrocele is noted in the right inguinal canal  Rec  - Care as per primary team      73yMale pmh HTN, DL, BPH presents for confusion and falls. GI consulted for anemia no gross GI bleeding. patient reports he never had a GI workup before.    #anemia no gross GI bleeding  normocytic, likely multifactorial anemia of chronic disease, possible avms, PUD?  patient with ecchymosis on back, b/l on gluteal area and perianal areas  rectal exam-reveals brown stool  Rec  -EGD/Colonoscopy Monday  -please prep patient Sunday  Colonoscopy Prep  -NPO aftermidnight Sunday into Monday  -AM CBC, AM CMP, AM INR, PT, PTT Monday  -Go-lytely 4L Prior day before Colonoscopy Sunday  -Four tablets Dulcolax two at 2pm and two at 6pm Prior to Colonoscopy Sunday  -Clear Liquids Day, no red dye before Colonoscopy Sunday  -Maintain Hemodynamic Stability   -Monitor CBC  -CMP,Optimize Electrolytes  -PT,PTT,INR  -EKG, Chest-Xray   -Transfuse prn to hgb >8  -Two large bore IV lines  - PPI BID  -Monitor Vital Signs  -Monitor Stool For blood, frequency, consistency, melena  -Active Type and Screen  -Iron Studies, Folate, Vitamin B12 levels   - Follow up with our [facility] located at [address] [place] [address]. Phone Number: [phone]     #isolated hyperbilirubinemia  #Hepatic steatosis is noted.  Rec  -can fractionate T-bili  -monitor LFTs   -Dietary and lifestyle modifications advised   -outpatient fibroscan   -outpatient can check acute hepatitis panel   -outpatient workup  - Follow up with our [facility] located at [address] [place] [address]. Phone Number: [phone].     #Compression fracture of the superior endplate of L1 of indeterminate age.  Rec  - Care as per primary team     #A right-sided hydrocele is noted in the right inguinal canal  Rec  - Care as per primary team      73yMale pmh HTN, DL, BPH presents for confusion and falls. GI consulted for anemia no gross GI bleeding. patient reports he never had a GI workup before.    #anemia no gross GI bleeding  normocytic, likely multifactorial anemia of chronic disease, possible avms, PUD?  patient with ecchymosis on back, b/l on gluteal area and perianal areas  CT without acute findings  rectal exam-reveals brown stool  Rec  -EGD/Colonoscopy Monday  -please prep patient Sunday  Colonoscopy Prep  -NPO aftermidnight Sunday into Monday  -AM CBC, AM CMP, AM INR, PT, PTT Monday  -Go-lytely 4L Prior day before Colonoscopy Sunday  -Four tablets Dulcolax two at 2pm and two at 6pm Prior to Colonoscopy Sunday  -Clear Liquids Day, no red dye before Colonoscopy Sunday  -Maintain Hemodynamic Stability   -Monitor CBC  -CMP,Optimize Electrolytes  -PT,PTT,INR  -EKG, Chest-Xray   -Transfuse prn to hgb >8  -Two large bore IV lines  - PPI BID  -Monitor Vital Signs  -Monitor Stool For blood, frequency, consistency, melena  -Active Type and Screen  -Iron Studies, Folate, Vitamin B12 levels   - Follow up with our [facility] located at [address] [place] [address]. Phone Number: [phone]     #isolated hyperbilirubinemia  #Hepatic steatosis is noted.  Rec  -can fractionate T-bili  -monitor LFTs   -Dietary and lifestyle modifications advised   -outpatient fibroscan   -outpatient can check acute hepatitis panel   -outpatient workup  - Follow up with our [facility] located at [address] [place] [address]. Phone Number: [phone].     #Compression fracture of the superior endplate of L1 of indeterminate age.  Rec  - Care as per primary team     #A right-sided hydrocele is noted in the right inguinal canal  Rec  - Care as per primary team      73yMale pmh HTN, DL, BPH presents for confusion and falls. GI consulted for anemia no gross GI bleeding. patient reports he never had a GI workup before.    #anemia no gross GI bleeding  normocytic, likely multifactorial anemia of chronic disease, possible avms, PUD?  patient with ecchymosis on back, b/l on gluteal area and perianal areas  CT without acute findings  rectal exam-reveals brown stool  Rec  -EGD/Colonoscopy Monday  -please prep patient Sunday  Colonoscopy Prep  -NPO aftermidnight Sunday into Monday  -AM CBC, AM CMP, AM INR, PT, PTT Monday  -Go-lytely 4L Prior day before Colonoscopy Sunday  -Four tablets Dulcolax two at 2pm and two at 6pm Prior to Colonoscopy Sunday  -Clear Liquids Day, no red dye before Colonoscopy Sunday  -Maintain Hemodynamic Stability   -Monitor CBC  -CMP,Optimize Electrolytes  -PT,PTT,INR  -EKG, Chest-Xray   -Transfuse prn to hgb >8  -Two large bore IV lines  - PPI BID  -Monitor Vital Signs  -Monitor Stool For blood, frequency, consistency, melena  -Active Type and Screen  -Iron Studies, Folate, Vitamin B12 levels   - Follow up with our [facility] located at [address] [place] [address]. Phone Number: [phone]     #isolated hyperbilirubinemia  #Hepatic steatosis is noted.  Rec  -can fractionate T-bili  -monitor LFTs   -Dietary and lifestyle modifications advised   -outpatient fibroscan   -outpatient can check acute hepatitis panel   -outpatient workup  - Follow up with our [facility] located at [address] [place] [address]. Phone Number: [phone].     #Compression fracture of the superior endplate of L1 of indeterminate age.  Rec  - Care as per primary team     #A right-sided hydrocele is noted in the right inguinal canal  Rec  - Care as per primary team

## 2024-01-05 NOTE — PROGRESS NOTE ADULT - ASSESSMENT
72yo M PMHx  of HTN, DL, BPH, depression presents to ER with daughter c/o confusion and frequent falls, found to have NACHO and anemia.     #Multiple Falls & Confusion likely Metabolic Encephalopathy  #frequent falls   - CT head negative for acute pathology   - pt/ot/rehab   - pt with worsening confusion as per daughter, follows Dr. Castillo as out patient (recommended decreasing v tapering off zyprexa (Psych Dr decreased dose, but does not agree it is causing symptoms)   - neuro consult appreciated    #NACHO likely pre-renal  - renal ultrasound reviewed, no hydronephrosis  PLAN  - renal on board  - LR at 75cc/hr  - cont holding losartan  - obtain UA, urine lytes, urine prot/creat ratio  - f/u serum free light chain ratio, spep/sife, upep/gary    #Acute on chronic anemia likely multifactorial anemia of chronic disease, possible avms, PUD?  - s/p 1 u pRBCs   PLAN  - GI on board  - Plan for EGD/Colonoscopy Monday  - will make NPO after MN on Sunday; needs Colonoscopy Prep: Go-lytely 4L Prior day before Colonoscopy Sunday; Four tablets Dulcolax two at 2pm and two at 6pm Prior to Colonoscopy Sunday; clear Liquids Day, no red dye before Colonoscopy Sunday  - Follow up with our [facility] located at [address] [place] [address]. Phone Number: [phone]   - Transfuse prn to hgb >7  - Two large bore IV lines  - PPI     #H/O HTN  #H/O DL  - hold losartan   - c/w home statin    #H/O BPH   - c/w tamsulosin    #H/O depressive psychosis   - c/w Paxil and Zyprexa  - pt follows Dr RESENDEZ ( via televisit but pt has had trouble with sessions and recommended in person eval)    DVT PPx: Scds   Diet:  DASH  Activity: IAT  Code: Full code    Progress Note Handoff  Pending Consults: None  Pending Tests: urine studies, blood works; EGD/colonoscopy   Pending Results: clinical improvement   Family Discussion: Patient and spoke to son/HCP on the phone  Disposition: Home__Xacute; family would prefer STR___/SNF______/Other_____/Unknown at this time_____  Spent over 55 min reviewing chart, speaking with patient/family and on coordinating patient care during interdisciplinary rounds  72yo M PMHx  of HTN, DL, BPH, depression presents to ER with daughter c/o confusion and frequent falls, found to have NACHO and anemia.     #Multiple Falls & Confusion likely Metabolic Encephalopathy  #frequent falls   - CT head negative for acute pathology   - pt/ot/rehab   - pt with worsening confusion as per daughter, follows Dr. Castillo as out patient (recommended decreasing v tapering off zyprexa (Psych Dr decreased dose, but does not agree it is causing symptoms)   - neuro consult appreciated    #NACHO likely pre-renal  - renal ultrasound reviewed, no hydronephrosis  PLAN  - renal on board  - LR at 75cc/hr  - cont holding losartan  - obtain UA, urine lytes, urine prot/creat ratio  - f/u serum free light chain ratio, spep/sife, upep/gary    #Acute on chronic anemia likely multifactorial anemia of chronic disease, possible avms, PUD?  - s/p 1 u pRBCs   PLAN  - GI on board  - Plan for EGD/Colonoscopy Monday  - will make NPO after MN on Sunday; needs Colonoscopy Prep: Go-lytely 4L Prior day before Colonoscopy Sunday; Four tablets Dulcolax two at 2pm and two at 6pm Prior to Colonoscopy Sunday; clear Liquids Day, no red dye before Colonoscopy Sunday  - Follow up with our [facility] located at [address] [place] [address]. Phone Number: [phone]   - Transfuse prn to hgb >7  - Two large bore IV lines  - PPI     #H/O HTN  #H/O DL  - hold losartan   - c/w home statin    #H/O BPH   - c/w tamsulosin    #H/O depressive psychosis   - c/w Paxil and Zyprexa  - pt follows Dr RESENDEZ ( via televisit but pt has had trouble with sessions and recommended in person eval)    DVT PPx: Scds   Diet:  DASH  Activity: IAT  Code: Full code    Progress Note Handoff  Pending Consults: None  Pending Tests: urine studies, blood works; EGD/colonoscopy   Pending Results: clinical improvement   Family Discussion: Patient and spoke to son/HCP on the phone  Disposition: Home__Xacute; family would prefer STR___/SNF______/Other_____/Unknown at this time_____  Spent over 55 min reviewing chart, speaking with patient/family and on coordinating patient care during interdisciplinary rounds

## 2024-01-05 NOTE — PROGRESS NOTE ADULT - NS ATTEND AMEND GEN_ALL_CORE FT
74yo male presents after a fall asked to evaluate for anemia, no overt GI bleeding. No reported endoscopies. CT without acute findings. Consider hematology evaluation for further workup of anemia as likely multifactorial. Plan for EGD/colonoscopy to further evaluate.

## 2024-01-05 NOTE — PROGRESS NOTE ADULT - NS ATTEND OPT1 GEN_ALL_CORE
Problem: Patient Care Overview (Adult)  Goal: Plan of Care Review  Outcome: Ongoing (interventions implemented as appropriate)    02/02/17 0605   Coping/Psychosocial Response Interventions   Plan Of Care Reviewed With patient;spouse   Patient Care Overview   Progress improving   Outcome Evaluation   Outcome Summary/Follow up Plan Pt has no c/o pain at this time, on room air, able to ambulate in hallway x2. Vital signs stable. Nitro drip off at this time.          02/02/17 0605   Coping/Psychosocial Response Interventions   Plan Of Care Reviewed With patient;spouse   Patient Care Overview   Progress improving   Outcome Evaluation   Outcome Summary/Follow up Plan Pt has no c/o pain at this time, on room air, able to ambulate in hallway x2. Vital signs stable. Nitro drip off at this time.        Goal: Adult Individualization and Mutuality  Outcome: Ongoing (interventions implemented as appropriate)  Goal: Discharge Needs Assessment  Outcome: Ongoing (interventions implemented as appropriate)    Problem: Acute Coronary Syndrome (ACS) (Adult)  Goal: Signs and Symptoms of Listed Potential Problems Will be Absent or Manageable (Acute Coronary Syndrome)  Outcome: Ongoing (interventions implemented as appropriate)       I attest my time as attending is greater than 50% of the total combined time spent on qualifying patient care activities by the PA/NP and attending.

## 2024-01-06 LAB
% ALBUMIN: 54.4 % — SIGNIFICANT CHANGE UP
% ALBUMIN: 54.4 % — SIGNIFICANT CHANGE UP
% ALPHA 1: 7.2 % — SIGNIFICANT CHANGE UP
% ALPHA 1: 7.2 % — SIGNIFICANT CHANGE UP
% ALPHA 2: 12 % — SIGNIFICANT CHANGE UP
% ALPHA 2: 12 % — SIGNIFICANT CHANGE UP
% BETA: 12.6 % — SIGNIFICANT CHANGE UP
% BETA: 12.6 % — SIGNIFICANT CHANGE UP
% GAMMA: 13.8 % — SIGNIFICANT CHANGE UP
% GAMMA: 13.8 % — SIGNIFICANT CHANGE UP
24R-OH-CALCIDIOL SERPL-MCNC: 20 NG/ML — LOW (ref 30–80)
24R-OH-CALCIDIOL SERPL-MCNC: 20 NG/ML — LOW (ref 30–80)
ALBUMIN SERPL ELPH-MCNC: 2.7 G/DL — LOW (ref 3.6–5.5)
ALBUMIN SERPL ELPH-MCNC: 2.7 G/DL — LOW (ref 3.6–5.5)
ALBUMIN SERPL ELPH-MCNC: 3.3 G/DL — LOW (ref 3.5–5.2)
ALBUMIN SERPL ELPH-MCNC: 3.3 G/DL — LOW (ref 3.5–5.2)
ALBUMIN/GLOB SERPL ELPH: 1.2 RATIO — SIGNIFICANT CHANGE UP
ALBUMIN/GLOB SERPL ELPH: 1.2 RATIO — SIGNIFICANT CHANGE UP
ALP SERPL-CCNC: 103 U/L — SIGNIFICANT CHANGE UP (ref 30–115)
ALP SERPL-CCNC: 103 U/L — SIGNIFICANT CHANGE UP (ref 30–115)
ALPHA1 GLOB SERPL ELPH-MCNC: 0.4 G/DL — SIGNIFICANT CHANGE UP (ref 0.1–0.4)
ALPHA1 GLOB SERPL ELPH-MCNC: 0.4 G/DL — SIGNIFICANT CHANGE UP (ref 0.1–0.4)
ALPHA2 GLOB SERPL ELPH-MCNC: 0.6 G/DL — SIGNIFICANT CHANGE UP (ref 0.5–1)
ALPHA2 GLOB SERPL ELPH-MCNC: 0.6 G/DL — SIGNIFICANT CHANGE UP (ref 0.5–1)
ALT FLD-CCNC: 21 U/L — SIGNIFICANT CHANGE UP (ref 0–41)
ALT FLD-CCNC: 21 U/L — SIGNIFICANT CHANGE UP (ref 0–41)
ANION GAP SERPL CALC-SCNC: 9 MMOL/L — SIGNIFICANT CHANGE UP (ref 7–14)
ANION GAP SERPL CALC-SCNC: 9 MMOL/L — SIGNIFICANT CHANGE UP (ref 7–14)
AST SERPL-CCNC: 20 U/L — SIGNIFICANT CHANGE UP (ref 0–41)
AST SERPL-CCNC: 20 U/L — SIGNIFICANT CHANGE UP (ref 0–41)
B-GLOBULIN SERPL ELPH-MCNC: 0.6 G/DL — SIGNIFICANT CHANGE UP (ref 0.5–1)
B-GLOBULIN SERPL ELPH-MCNC: 0.6 G/DL — SIGNIFICANT CHANGE UP (ref 0.5–1)
BILIRUB SERPL-MCNC: 1 MG/DL — SIGNIFICANT CHANGE UP (ref 0.2–1.2)
BILIRUB SERPL-MCNC: 1 MG/DL — SIGNIFICANT CHANGE UP (ref 0.2–1.2)
BUN SERPL-MCNC: 21 MG/DL — HIGH (ref 10–20)
BUN SERPL-MCNC: 21 MG/DL — HIGH (ref 10–20)
CALCIUM SERPL-MCNC: 8.3 MG/DL — LOW (ref 8.4–10.5)
CALCIUM SERPL-MCNC: 8.3 MG/DL — LOW (ref 8.4–10.5)
CHLORIDE SERPL-SCNC: 103 MMOL/L — SIGNIFICANT CHANGE UP (ref 98–110)
CHLORIDE SERPL-SCNC: 103 MMOL/L — SIGNIFICANT CHANGE UP (ref 98–110)
CO2 SERPL-SCNC: 26 MMOL/L — SIGNIFICANT CHANGE UP (ref 17–32)
CO2 SERPL-SCNC: 26 MMOL/L — SIGNIFICANT CHANGE UP (ref 17–32)
CREAT SERPL-MCNC: 0.8 MG/DL — SIGNIFICANT CHANGE UP (ref 0.7–1.5)
CREAT SERPL-MCNC: 0.8 MG/DL — SIGNIFICANT CHANGE UP (ref 0.7–1.5)
EGFR: 92 ML/MIN/1.73M2 — SIGNIFICANT CHANGE UP
EGFR: 92 ML/MIN/1.73M2 — SIGNIFICANT CHANGE UP
GAMMA GLOBULIN: 0.7 G/DL — SIGNIFICANT CHANGE UP (ref 0.6–1.6)
GAMMA GLOBULIN: 0.7 G/DL — SIGNIFICANT CHANGE UP (ref 0.6–1.6)
GLUCOSE SERPL-MCNC: 90 MG/DL — SIGNIFICANT CHANGE UP (ref 70–99)
GLUCOSE SERPL-MCNC: 90 MG/DL — SIGNIFICANT CHANGE UP (ref 70–99)
HCT VFR BLD CALC: 29.1 % — LOW (ref 42–52)
HCT VFR BLD CALC: 29.1 % — LOW (ref 42–52)
HGB BLD-MCNC: 9.8 G/DL — LOW (ref 14–18)
HGB BLD-MCNC: 9.8 G/DL — LOW (ref 14–18)
INTERPRETATION SERPL IFE-IMP: SIGNIFICANT CHANGE UP
INTERPRETATION SERPL IFE-IMP: SIGNIFICANT CHANGE UP
MCHC RBC-ENTMCNC: 30.4 PG — SIGNIFICANT CHANGE UP (ref 27–31)
MCHC RBC-ENTMCNC: 30.4 PG — SIGNIFICANT CHANGE UP (ref 27–31)
MCHC RBC-ENTMCNC: 33.7 G/DL — SIGNIFICANT CHANGE UP (ref 32–37)
MCHC RBC-ENTMCNC: 33.7 G/DL — SIGNIFICANT CHANGE UP (ref 32–37)
MCV RBC AUTO: 90.4 FL — SIGNIFICANT CHANGE UP (ref 80–94)
MCV RBC AUTO: 90.4 FL — SIGNIFICANT CHANGE UP (ref 80–94)
NRBC # BLD: 0 /100 WBCS — SIGNIFICANT CHANGE UP (ref 0–0)
NRBC # BLD: 0 /100 WBCS — SIGNIFICANT CHANGE UP (ref 0–0)
PLATELET # BLD AUTO: 244 K/UL — SIGNIFICANT CHANGE UP (ref 130–400)
PLATELET # BLD AUTO: 244 K/UL — SIGNIFICANT CHANGE UP (ref 130–400)
PMV BLD: 9.4 FL — SIGNIFICANT CHANGE UP (ref 7.4–10.4)
PMV BLD: 9.4 FL — SIGNIFICANT CHANGE UP (ref 7.4–10.4)
POTASSIUM SERPL-MCNC: 4.5 MMOL/L — SIGNIFICANT CHANGE UP (ref 3.5–5)
POTASSIUM SERPL-MCNC: 4.5 MMOL/L — SIGNIFICANT CHANGE UP (ref 3.5–5)
POTASSIUM SERPL-SCNC: 4.5 MMOL/L — SIGNIFICANT CHANGE UP (ref 3.5–5)
POTASSIUM SERPL-SCNC: 4.5 MMOL/L — SIGNIFICANT CHANGE UP (ref 3.5–5)
PROT PATTERN SERPL ELPH-IMP: SIGNIFICANT CHANGE UP
PROT PATTERN SERPL ELPH-IMP: SIGNIFICANT CHANGE UP
PROT SERPL-MCNC: 5 G/DL — LOW (ref 6–8.3)
PROT SERPL-MCNC: 5 G/DL — LOW (ref 6–8.3)
PROT SERPL-MCNC: 5.5 G/DL — LOW (ref 6–8)
PROT SERPL-MCNC: 5.5 G/DL — LOW (ref 6–8)
RBC # BLD: 3.22 M/UL — LOW (ref 4.7–6.1)
RBC # BLD: 3.22 M/UL — LOW (ref 4.7–6.1)
RBC # FLD: 14.6 % — HIGH (ref 11.5–14.5)
RBC # FLD: 14.6 % — HIGH (ref 11.5–14.5)
SODIUM SERPL-SCNC: 138 MMOL/L — SIGNIFICANT CHANGE UP (ref 135–146)
SODIUM SERPL-SCNC: 138 MMOL/L — SIGNIFICANT CHANGE UP (ref 135–146)
WBC # BLD: 8.93 K/UL — SIGNIFICANT CHANGE UP (ref 4.8–10.8)
WBC # BLD: 8.93 K/UL — SIGNIFICANT CHANGE UP (ref 4.8–10.8)
WBC # FLD AUTO: 8.93 K/UL — SIGNIFICANT CHANGE UP (ref 4.8–10.8)
WBC # FLD AUTO: 8.93 K/UL — SIGNIFICANT CHANGE UP (ref 4.8–10.8)

## 2024-01-06 PROCEDURE — 99232 SBSQ HOSP IP/OBS MODERATE 35: CPT

## 2024-01-06 RX ADMIN — ATORVASTATIN CALCIUM 10 MILLIGRAM(S): 80 TABLET, FILM COATED ORAL at 21:17

## 2024-01-06 RX ADMIN — SODIUM CHLORIDE 75 MILLILITER(S): 9 INJECTION, SOLUTION INTRAVENOUS at 08:24

## 2024-01-06 RX ADMIN — Medication 40 MILLIGRAM(S): at 11:45

## 2024-01-06 RX ADMIN — OLANZAPINE 2.5 MILLIGRAM(S): 15 TABLET, FILM COATED ORAL at 21:17

## 2024-01-06 RX ADMIN — AMLODIPINE BESYLATE 2.5 MILLIGRAM(S): 2.5 TABLET ORAL at 05:21

## 2024-01-06 RX ADMIN — TAMSULOSIN HYDROCHLORIDE 0.4 MILLIGRAM(S): 0.4 CAPSULE ORAL at 21:34

## 2024-01-06 RX ADMIN — PANTOPRAZOLE SODIUM 40 MILLIGRAM(S): 20 TABLET, DELAYED RELEASE ORAL at 05:22

## 2024-01-06 RX ADMIN — Medication 325 MILLIGRAM(S): at 11:46

## 2024-01-06 NOTE — PROGRESS NOTE ADULT - SUBJECTIVE AND OBJECTIVE BOX
NERY RAOY  73y  Male      Patient is a 73y old  Male who presents with a chief complaint of confusion and falls (03 Jan 2024 12:05)      INTERVAL HPI/OVERNIGHT EVENTS:  Patient seen and examined earlier this morning; patient denies any new complaints; history limited due to confusion.      REVIEW OF SYSTEMS: limited due to condition    Vital Signs Last 24 Hrs  T(C): 35.5 (06 Jan 2024 04:47), Max: 36.2 (05 Jan 2024 13:57)  T(F): 95.9 (06 Jan 2024 04:47), Max: 97.2 (05 Jan 2024 13:57)  HR: 83 (06 Jan 2024 04:47) (68 - 83)  BP: 160/65 (06 Jan 2024 04:47) (157/59 - 184/69)  BP(mean): --  RR: 18 (06 Jan 2024 04:47) (18 - 18)  SpO2: 98% (06 Jan 2024 04:47) (98% - 98%)    Parameters below as of 06 Jan 2024 04:47  Patient On (Oxygen Delivery Method): room air  PHYSICAL EXAM:  SKIN: warm, dry, +Multiple areas of bruising in different stages of healing  HEAD: Normocephalic; old superficial abrasions   EYES: PERRL, EOMI, normal sclera and conjunctiva   NECK: Supple; non tender.  CARD:  Regular rate and rhythm.   RESP: CTAb/l, no w/r/r  ABD: soft ntnd, VIRGIE negative.  +Bruising to bilateral buttocks.  EXT: Normal ROM.    NEURO: AAOx2 moving all extremities  PSYCH: Cooperative, appropriate.    Consultant(s) Notes Reviewed:  [x ] YES  [ ] NO  Care Discussed with Consultants/Other Providers [ x] YES  [ ] NO    LAB:                        9.8    8.93  )-----------( 244      ( 06 Jan 2024 08:28 )             29.1   01-06    138  |  103  |  21<H>  ----------------------------<  90  4.5   |  26  |  0.8    Ca    8.3<L>      06 Jan 2024 08:28    TPro  5.5<L>  /  Alb  3.3<L>  /  TBili  1.0  /  DBili  x   /  AST  20  /  ALT  21  /  AlkPhos  103  01-06        Drug Dosing Weight  Height (cm): 165.1 (03 Jan 2024 00:33)  Weight (kg): 54.7 (03 Jan 2024 00:33)  BMI (kg/m2): 20.1 (03 Jan 2024 00:33)  BSA (m2): 1.6 (03 Jan 2024 00:33)  Height (cm): 165.1 (01-03-24 @ 00:33)  Weight (kg): 54.7 (01-03-24 @ 00:33)  BMI (kg/m2): 20.1 (01-03-24 @ 00:33)  BSA (m2): 1.6 (01-03-24 @ 00:33)  CAPILLARY BLOOD GLUCOSE        I&O's Summary    Urinalysis Basic - ( 03 Jan 2024 04:30 )    Color: x / Appearance: x / SG: x / pH: x  Gluc: 87 mg/dL / Ketone: x  / Bili: x / Urobili: x   Blood: x / Protein: x / Nitrite: x   Leuk Esterase: x / RBC: x / WBC x   Sq Epi: x / Non Sq Epi: x / Bacteria: x        RADIOLOGY & ADDITIONAL TESTS:  Imaging Personally Reviewed:  [x] YES  [ ] NO    HEALTH ISSUES - PROBLEM Dx:        MEDICATIONS  (STANDING):  amLODIPine   Tablet 2.5 milliGRAM(s) Oral daily  atorvastatin 10 milliGRAM(s) Oral at bedtime  ferrous    sulfate 325 milliGRAM(s) Oral daily  lactated ringers. 1000 milliLiter(s) (75 mL/Hr) IV Continuous <Continuous>  OLANZapine 2.5 milliGRAM(s) Oral at bedtime  pantoprazole    Tablet 40 milliGRAM(s) Oral before breakfast  PARoxetine 40 milliGRAM(s) Oral daily  tamsulosin 0.4 milliGRAM(s) Oral at bedtime    MEDICATIONS  (PRN):  acetaminophen     Tablet .. 650 milliGRAM(s) Oral every 6 hours PRN Temp greater or equal to 38C (100.4F), Mild Pain (1 - 3)

## 2024-01-06 NOTE — PROGRESS NOTE ADULT - ASSESSMENT
74yo M PMHx  of HTN, DL, BPH, depression presents to ER with daughter c/o confusion and frequent falls, found to have NACHO and anemia.     #Multiple Falls & Confusion likely Metabolic Encephalopathy  #frequent falls   - CT head negative for acute pathology   - pt/ot/rehab   - pt with worsening confusion as per daughter, follows Dr. Castillo as out patient (recommended decreasing v tapering off zyprexa (Psych Dr decreased dose, but does not agree it is causing symptoms)   - neuro consult appreciated    #NACHO likely pre-renal- resolved  - renal ultrasound reviewed, no hydronephrosis  - elevated kappa/lamdba ratio  PLAN  - renal eval appreciated  - cont holding losartan  - follow up with renal as outpatient    #Acute on chronic anemia likely multifactorial anemia of chronic disease, possible avms, PUD?  - s/p 1 u pRBCs   PLAN  - GI on board  - Plan for EGD/Colonoscopy Monday  - will make NPO after MN on Sunday; needs Colonoscopy Prep: Go-lytely 4L Prior day before Colonoscopy Sunday; Four tablets Dulcolax two at 2pm and two at 6pm Prior to Colonoscopy Sunday; clear Liquids Day, no red dye before Colonoscopy Sunday  - Follow up with our [facility] located at [address] [place] [address]. Phone Number: [phone]   - Transfuse prn to hgb >7  - Two large bore IV lines  - PPI     #H/O HTN  #H/O DL  - hold losartan   - c/w home statin    #H/O BPH   - c/w tamsulosin    #H/O depressive psychosis   - c/w Paxil and Zyprexa  - pt follows Dr RESENDEZ ( via televisit but pt has had trouble with sessions and recommended in person eval)    DVT PPx: Scds   Diet:  DASH  Activity: IAT  Code: Full code    Progress Note Handoff  Pending Consults: None  Pending Tests:  EGD/colonoscopy   Pending Results: clinical improvement   Family Discussion: Patient and spoke to son/HCP on the phone  Disposition: Home__Xacute; family would prefer STR___/SNF______/Other_____/Unknown at this time_____  Spent over 55 min reviewing chart, speaking with patient/family and on coordinating patient care during interdisciplinary rounds  72yo M PMHx  of HTN, DL, BPH, depression presents to ER with daughter c/o confusion and frequent falls, found to have NACHO and anemia.     #Multiple Falls & Confusion likely Metabolic Encephalopathy  #frequent falls   - CT head negative for acute pathology   - pt/ot/rehab   - pt with worsening confusion as per daughter, follows Dr. Castillo as out patient (recommended decreasing v tapering off zyprexa (Psych Dr decreased dose, but does not agree it is causing symptoms)   - neuro consult appreciated    #NACHO likely pre-renal- resolved  - renal ultrasound reviewed, no hydronephrosis  - elevated kappa/lamdba ratio  PLAN  - renal eval appreciated  - cont holding losartan  - follow up with renal as outpatient    #Acute on chronic anemia likely multifactorial anemia of chronic disease, possible avms, PUD?  - s/p 1 u pRBCs   PLAN  - GI on board  - Plan for EGD/Colonoscopy Monday  - will make NPO after MN on Sunday; needs Colonoscopy Prep: Go-lytely 4L Prior day before Colonoscopy Sunday; Four tablets Dulcolax two at 2pm and two at 6pm Prior to Colonoscopy Sunday; clear Liquids Day, no red dye before Colonoscopy Sunday  - Follow up with our [facility] located at [address] [place] [address]. Phone Number: [phone]   - Transfuse prn to hgb >7  - Two large bore IV lines  - PPI     #H/O HTN  #H/O DL  - hold losartan   - c/w home statin    #H/O BPH   - c/w tamsulosin    #H/O depressive psychosis   - c/w Paxil and Zyprexa  - pt follows Dr RESENDEZ ( via televisit but pt has had trouble with sessions and recommended in person eval)    DVT PPx: Scds   Diet:  DASH  Activity: IAT  Code: Full code    Progress Note Handoff  Pending Consults: None  Pending Tests:  EGD/colonoscopy   Pending Results: clinical improvement   Family Discussion: Patient and spoke to son/HCP on the phone  Disposition: Home__Xacute; family would prefer STR___/SNF______/Other_____/Unknown at this time_____  Spent over 55 min reviewing chart, speaking with patient/family and on coordinating patient care during interdisciplinary rounds

## 2024-01-07 LAB
ANION GAP SERPL CALC-SCNC: 7 MMOL/L — SIGNIFICANT CHANGE UP (ref 7–14)
ANION GAP SERPL CALC-SCNC: 7 MMOL/L — SIGNIFICANT CHANGE UP (ref 7–14)
BUN SERPL-MCNC: 23 MG/DL — HIGH (ref 10–20)
BUN SERPL-MCNC: 23 MG/DL — HIGH (ref 10–20)
CALCIT SERPL-MCNC: <1 PG/ML — SIGNIFICANT CHANGE UP
CALCIT SERPL-MCNC: <1 PG/ML — SIGNIFICANT CHANGE UP
CALCIUM SERPL-MCNC: 8.4 MG/DL — SIGNIFICANT CHANGE UP (ref 8.4–10.5)
CALCIUM SERPL-MCNC: 8.4 MG/DL — SIGNIFICANT CHANGE UP (ref 8.4–10.5)
CALCIUM SERPL-MCNC: 8.5 MG/DL — SIGNIFICANT CHANGE UP (ref 8.4–10.5)
CALCIUM SERPL-MCNC: 8.5 MG/DL — SIGNIFICANT CHANGE UP (ref 8.4–10.5)
CHLORIDE SERPL-SCNC: 105 MMOL/L — SIGNIFICANT CHANGE UP (ref 98–110)
CHLORIDE SERPL-SCNC: 105 MMOL/L — SIGNIFICANT CHANGE UP (ref 98–110)
CO2 SERPL-SCNC: 27 MMOL/L — SIGNIFICANT CHANGE UP (ref 17–32)
CO2 SERPL-SCNC: 27 MMOL/L — SIGNIFICANT CHANGE UP (ref 17–32)
CREAT SERPL-MCNC: 0.9 MG/DL — SIGNIFICANT CHANGE UP (ref 0.7–1.5)
CREAT SERPL-MCNC: 0.9 MG/DL — SIGNIFICANT CHANGE UP (ref 0.7–1.5)
EGFR: 89 ML/MIN/1.73M2 — SIGNIFICANT CHANGE UP
EGFR: 89 ML/MIN/1.73M2 — SIGNIFICANT CHANGE UP
GLUCOSE SERPL-MCNC: 83 MG/DL — SIGNIFICANT CHANGE UP (ref 70–99)
GLUCOSE SERPL-MCNC: 83 MG/DL — SIGNIFICANT CHANGE UP (ref 70–99)
POTASSIUM SERPL-MCNC: 4.7 MMOL/L — SIGNIFICANT CHANGE UP (ref 3.5–5)
POTASSIUM SERPL-MCNC: 4.7 MMOL/L — SIGNIFICANT CHANGE UP (ref 3.5–5)
POTASSIUM SERPL-SCNC: 4.7 MMOL/L — SIGNIFICANT CHANGE UP (ref 3.5–5)
POTASSIUM SERPL-SCNC: 4.7 MMOL/L — SIGNIFICANT CHANGE UP (ref 3.5–5)
PTH-INTACT FLD-MCNC: 34 PG/ML — SIGNIFICANT CHANGE UP (ref 15–65)
PTH-INTACT FLD-MCNC: 34 PG/ML — SIGNIFICANT CHANGE UP (ref 15–65)
SODIUM SERPL-SCNC: 139 MMOL/L — SIGNIFICANT CHANGE UP (ref 135–146)
SODIUM SERPL-SCNC: 139 MMOL/L — SIGNIFICANT CHANGE UP (ref 135–146)
VIT D25+D1,25 OH+D1,25 PNL SERPL-MCNC: 64.2 PG/ML — SIGNIFICANT CHANGE UP (ref 19.9–79.3)
VIT D25+D1,25 OH+D1,25 PNL SERPL-MCNC: 64.2 PG/ML — SIGNIFICANT CHANGE UP (ref 19.9–79.3)

## 2024-01-07 PROCEDURE — 99232 SBSQ HOSP IP/OBS MODERATE 35: CPT

## 2024-01-07 RX ORDER — AMLODIPINE BESYLATE 2.5 MG/1
5 TABLET ORAL ONCE
Refills: 0 | Status: COMPLETED | OUTPATIENT
Start: 2024-01-07 | End: 2024-01-07

## 2024-01-07 RX ORDER — SOD SULF/SODIUM/NAHCO3/KCL/PEG
4000 SOLUTION, RECONSTITUTED, ORAL ORAL ONCE
Refills: 0 | Status: COMPLETED | OUTPATIENT
Start: 2024-01-07 | End: 2024-01-07

## 2024-01-07 RX ADMIN — PANTOPRAZOLE SODIUM 40 MILLIGRAM(S): 20 TABLET, DELAYED RELEASE ORAL at 06:04

## 2024-01-07 RX ADMIN — TAMSULOSIN HYDROCHLORIDE 0.4 MILLIGRAM(S): 0.4 CAPSULE ORAL at 21:18

## 2024-01-07 RX ADMIN — Medication 40 MILLIGRAM(S): at 11:38

## 2024-01-07 RX ADMIN — ATORVASTATIN CALCIUM 10 MILLIGRAM(S): 80 TABLET, FILM COATED ORAL at 21:18

## 2024-01-07 RX ADMIN — Medication 325 MILLIGRAM(S): at 11:38

## 2024-01-07 RX ADMIN — AMLODIPINE BESYLATE 2.5 MILLIGRAM(S): 2.5 TABLET ORAL at 06:04

## 2024-01-07 RX ADMIN — Medication 20 MILLIGRAM(S): at 17:28

## 2024-01-07 RX ADMIN — Medication 20 MILLIGRAM(S): at 11:39

## 2024-01-07 RX ADMIN — Medication 4000 MILLILITER(S): at 17:28

## 2024-01-07 RX ADMIN — AMLODIPINE BESYLATE 5 MILLIGRAM(S): 2.5 TABLET ORAL at 23:17

## 2024-01-07 RX ADMIN — OLANZAPINE 2.5 MILLIGRAM(S): 15 TABLET, FILM COATED ORAL at 21:18

## 2024-01-07 NOTE — CHART NOTE - NSCHARTNOTEFT_GEN_A_CORE
BP particularly high, will try an extra dose of Norvasc. Also some asymmetry noted to face. In fact patient is reporting, in very good detail, having Bell's Palsy BP particularly high, will try an extra dose of Norvasc. Also some asymmetry noted to face. In fact patient is reporting, in very good detail, having Bell's Palsy in the past which affected right side of face. We (myself and his nurse) showed him mirror image of his face both smiling and not smiling, and he tells us this is how his face normally looks

## 2024-01-07 NOTE — PROGRESS NOTE ADULT - SUBJECTIVE AND OBJECTIVE BOX
MATTHEW RAO  73y  Male      Patient is a 73y old  Male who presents with a chief complaint of confusion and falls (03 Jan 2024 12:05)      INTERVAL HPI/OVERNIGHT EVENTS:  Patient seen and examined earlier this morning; patient denies any new complaints; denies F/C, CP, palpitations, abd pain, diarrhea.       REVIEW OF SYSTEMS: all systems reviewed and are otherwise negative    Vital Signs Last 24 Hrs  T(C): 36 (07 Jan 2024 05:44), Max: 36.2 (06 Jan 2024 14:42)  T(F): 96.8 (07 Jan 2024 05:44), Max: 97.2 (06 Jan 2024 14:42)  HR: 72 (07 Jan 2024 05:44) (71 - 72)  BP: 184/78 (07 Jan 2024 05:44) (148/63 - 184/78)  BP(mean): --  RR: 18 (07 Jan 2024 05:44) (18 - 18)  SpO2: 98% (07 Jan 2024 05:44) (98% - 98%)    Parameters below as of 07 Jan 2024 05:44  Patient On (Oxygen Delivery Method): room air  PHYSICAL EXAM:  SKIN: warm, dry, +Multiple areas of bruising in different stages of healing  HEAD: Normocephalic; old superficial abrasions   EYES: PERRL, EOMI, normal sclera and conjunctiva   NECK: Supple; non tender.  CARD:  Regular rate and rhythm.   RESP: CTAb/l, no w/r/r  ABD: soft ntnd, VIRGIE negative.  +Bruising to bilateral buttocks.  EXT: Normal ROM.    NEURO: AAOx2 moving all extremities  PSYCH: Cooperative, appropriate.    Consultant(s) Notes Reviewed:  [x ] YES  [ ] NO  Care Discussed with Consultants/Other Providers [ x] YES  [ ] NO    LAB:                        9.8    8.93  )-----------( 244      ( 06 Jan 2024 08:28 )             29.1   01-06    138  |  103  |  21<H>  ----------------------------<  90  4.5   |  26  |  0.8    Ca    8.3<L>      06 Jan 2024 08:28    TPro  5.5<L>  /  Alb  3.3<L>  /  TBili  1.0  /  DBili  x   /  AST  20  /  ALT  21  /  AlkPhos  103  01-06        Drug Dosing Weight  Height (cm): 165.1 (03 Jan 2024 00:33)  Weight (kg): 54.7 (03 Jan 2024 00:33)  BMI (kg/m2): 20.1 (03 Jan 2024 00:33)  BSA (m2): 1.6 (03 Jan 2024 00:33)  Height (cm): 165.1 (01-03-24 @ 00:33)  Weight (kg): 54.7 (01-03-24 @ 00:33)  BMI (kg/m2): 20.1 (01-03-24 @ 00:33)  BSA (m2): 1.6 (01-03-24 @ 00:33)  CAPILLARY BLOOD GLUCOSE        I&O's Summary    Urinalysis Basic - ( 03 Jan 2024 04:30 )    Color: x / Appearance: x / SG: x / pH: x  Gluc: 87 mg/dL / Ketone: x  / Bili: x / Urobili: x   Blood: x / Protein: x / Nitrite: x   Leuk Esterase: x / RBC: x / WBC x   Sq Epi: x / Non Sq Epi: x / Bacteria: x        RADIOLOGY & ADDITIONAL TESTS:  Imaging Personally Reviewed:  [x] YES  [ ] NO    HEALTH ISSUES - PROBLEM Dx:      MEDICATIONS  (STANDING):  amLODIPine   Tablet 2.5 milliGRAM(s) Oral daily  atorvastatin 10 milliGRAM(s) Oral at bedtime  bisacodyl 20 milliGRAM(s) Oral once  bisacodyl 20 milliGRAM(s) Oral once  ferrous    sulfate 325 milliGRAM(s) Oral daily  OLANZapine 2.5 milliGRAM(s) Oral at bedtime  pantoprazole    Tablet 40 milliGRAM(s) Oral before breakfast  PARoxetine 40 milliGRAM(s) Oral daily  polyethylene glycol/electrolyte Solution. 4000 milliLiter(s) Oral once  tamsulosin 0.4 milliGRAM(s) Oral at bedtime    MEDICATIONS  (PRN):  acetaminophen     Tablet .. 650 milliGRAM(s) Oral every 6 hours PRN Temp greater or equal to 38C (100.4F), Mild Pain (1 - 3)

## 2024-01-07 NOTE — PROGRESS NOTE ADULT - ASSESSMENT
72yo M PMHx  of HTN, DL, BPH, depression presents to ER with daughter c/o confusion and frequent falls, found to have NACHO and anemia.    #Multiple Falls & Confusion likely Metabolic Encephalopathy  #frequent falls   - CT head negative for acute pathology   - pt/ot/rehab   - pt with worsening confusion as per daughter, follows Dr. Castillo as out patient (recommended decreasing v tapering off zyprexa (Psych Dr decreased dose, but does not agree it is causing symptoms)   - neuro consult appreciated    #NACHO likely pre-renal- resolved  - renal ultrasound reviewed, no hydronephrosis  - elevated kappa/lamdba ratio  PLAN  - renal eval appreciated  - cont holding losartan  - follow up with renal as outpatient    #Acute on chronic anemia likely multifactorial anemia of chronic disease, possible avms, PUD?  - s/p 1 u pRBCs   PLAN  - GI on board  - Plan for EGD/Colonoscopy tomorrow  - CLD today; NPO after MN  - bowel prep today  - Follow up with our [facility] located at [address] [place] [address]. Phone Number: [phone]   - Transfuse prn to hgb >7  - Two large bore IV lines  - PPI     #H/O HTN  #H/O DL  - hold losartan   - c/w home statin    #H/O BPH   - c/w tamsulosin    #H/O depressive psychosis   - c/w Paxil and Zyprexa  - pt follows Dr RESENDEZ ( via televisit but pt has had trouble with sessions and recommended in person eval)    DVT PPx: Scds   Diet:  DASH  Activity: IAT  Code: Full code    Progress Note Handoff  Pending Consults: None  Pending Tests:  EGD/colonoscopy   Pending Results: clinical improvement   Family Discussion: Patient and spoke to son/HCP on the phone  Disposition: Home__Xlikely dc in 24 hrs, family would prefer STR___/SNF______/Other_____/Unknown at this time_____  Spent over 55 min reviewing chart, speaking with patient/family and on coordinating patient care during interdisciplinary rounds  74yo M PMHx  of HTN, DL, BPH, depression presents to ER with daughter c/o confusion and frequent falls, found to have NACHO and anemia.    #Multiple Falls & Confusion likely Metabolic Encephalopathy  #frequent falls   - CT head negative for acute pathology   - pt/ot/rehab   - pt with worsening confusion as per daughter, follows Dr. Castillo as out patient (recommended decreasing v tapering off zyprexa (Psych Dr decreased dose, but does not agree it is causing symptoms)   - neuro consult appreciated    #NACHO likely pre-renal- resolved  - renal ultrasound reviewed, no hydronephrosis  - elevated kappa/lamdba ratio  PLAN  - renal eval appreciated  - cont holding losartan  - follow up with renal as outpatient    #Acute on chronic anemia likely multifactorial anemia of chronic disease, possible avms, PUD?  - s/p 1 u pRBCs   PLAN  - GI on board  - Plan for EGD/Colonoscopy tomorrow  - CLD today; NPO after MN  - bowel prep today  - Follow up with our [facility] located at [address] [place] [address]. Phone Number: [phone]   - Transfuse prn to hgb >7  - Two large bore IV lines  - PPI     #H/O HTN  #H/O DL  - hold losartan   - c/w home statin    #H/O BPH   - c/w tamsulosin    #H/O depressive psychosis   - c/w Paxil and Zyprexa  - pt follows Dr RESENDEZ ( via televisit but pt has had trouble with sessions and recommended in person eval)    DVT PPx: Scds   Diet:  DASH  Activity: IAT  Code: Full code    Progress Note Handoff  Pending Consults: None  Pending Tests:  EGD/colonoscopy   Pending Results: clinical improvement   Family Discussion: Patient and spoke to son/HCP on the phone  Disposition: Home__Xlikely dc in 24 hrs, family would prefer STR___/SNF______/Other_____/Unknown at this time_____  Spent over 55 min reviewing chart, speaking with patient/family and on coordinating patient care during interdisciplinary rounds

## 2024-01-07 NOTE — PROGRESS NOTE ADULT - REASON FOR ADMISSION
confusion and falls

## 2024-01-08 ENCOUNTER — TRANSCRIPTION ENCOUNTER (OUTPATIENT)
Age: 74
End: 2024-01-08

## 2024-01-08 ENCOUNTER — RESULT REVIEW (OUTPATIENT)
Age: 74
End: 2024-01-08

## 2024-01-08 VITALS
DIASTOLIC BLOOD PRESSURE: 59 MMHG | RESPIRATION RATE: 18 BRPM | TEMPERATURE: 96 F | HEART RATE: 87 BPM | SYSTOLIC BLOOD PRESSURE: 147 MMHG

## 2024-01-08 LAB
ANION GAP SERPL CALC-SCNC: 10 MMOL/L — SIGNIFICANT CHANGE UP (ref 7–14)
ANION GAP SERPL CALC-SCNC: 10 MMOL/L — SIGNIFICANT CHANGE UP (ref 7–14)
BUN SERPL-MCNC: 14 MG/DL — SIGNIFICANT CHANGE UP (ref 10–20)
BUN SERPL-MCNC: 14 MG/DL — SIGNIFICANT CHANGE UP (ref 10–20)
CALCIUM SERPL-MCNC: 9 MG/DL — SIGNIFICANT CHANGE UP (ref 8.4–10.5)
CALCIUM SERPL-MCNC: 9 MG/DL — SIGNIFICANT CHANGE UP (ref 8.4–10.5)
CHLORIDE SERPL-SCNC: 102 MMOL/L — SIGNIFICANT CHANGE UP (ref 98–110)
CHLORIDE SERPL-SCNC: 102 MMOL/L — SIGNIFICANT CHANGE UP (ref 98–110)
CO2 SERPL-SCNC: 26 MMOL/L — SIGNIFICANT CHANGE UP (ref 17–32)
CO2 SERPL-SCNC: 26 MMOL/L — SIGNIFICANT CHANGE UP (ref 17–32)
CREAT SERPL-MCNC: 0.8 MG/DL — SIGNIFICANT CHANGE UP (ref 0.7–1.5)
CREAT SERPL-MCNC: 0.8 MG/DL — SIGNIFICANT CHANGE UP (ref 0.7–1.5)
EGFR: 92 ML/MIN/1.73M2 — SIGNIFICANT CHANGE UP
EGFR: 92 ML/MIN/1.73M2 — SIGNIFICANT CHANGE UP
GLUCOSE SERPL-MCNC: 95 MG/DL — SIGNIFICANT CHANGE UP (ref 70–99)
GLUCOSE SERPL-MCNC: 95 MG/DL — SIGNIFICANT CHANGE UP (ref 70–99)
HCT VFR BLD CALC: 30.7 % — LOW (ref 42–52)
HCT VFR BLD CALC: 30.7 % — LOW (ref 42–52)
HGB BLD-MCNC: 10.5 G/DL — LOW (ref 14–18)
HGB BLD-MCNC: 10.5 G/DL — LOW (ref 14–18)
MCHC RBC-ENTMCNC: 30.3 PG — SIGNIFICANT CHANGE UP (ref 27–31)
MCHC RBC-ENTMCNC: 30.3 PG — SIGNIFICANT CHANGE UP (ref 27–31)
MCHC RBC-ENTMCNC: 34.2 G/DL — SIGNIFICANT CHANGE UP (ref 32–37)
MCHC RBC-ENTMCNC: 34.2 G/DL — SIGNIFICANT CHANGE UP (ref 32–37)
MCV RBC AUTO: 88.5 FL — SIGNIFICANT CHANGE UP (ref 80–94)
MCV RBC AUTO: 88.5 FL — SIGNIFICANT CHANGE UP (ref 80–94)
NRBC # BLD: 0 /100 WBCS — SIGNIFICANT CHANGE UP (ref 0–0)
NRBC # BLD: 0 /100 WBCS — SIGNIFICANT CHANGE UP (ref 0–0)
PLATELET # BLD AUTO: 159 K/UL — SIGNIFICANT CHANGE UP (ref 130–400)
PLATELET # BLD AUTO: 159 K/UL — SIGNIFICANT CHANGE UP (ref 130–400)
POTASSIUM SERPL-MCNC: 5.2 MMOL/L — HIGH (ref 3.5–5)
POTASSIUM SERPL-MCNC: 5.2 MMOL/L — HIGH (ref 3.5–5)
POTASSIUM SERPL-SCNC: 5.2 MMOL/L — HIGH (ref 3.5–5)
POTASSIUM SERPL-SCNC: 5.2 MMOL/L — HIGH (ref 3.5–5)
RBC # BLD: 3.47 M/UL — LOW (ref 4.7–6.1)
RBC # BLD: 3.47 M/UL — LOW (ref 4.7–6.1)
RBC # FLD: 14.6 % — HIGH (ref 11.5–14.5)
RBC # FLD: 14.6 % — HIGH (ref 11.5–14.5)
SODIUM SERPL-SCNC: 138 MMOL/L — SIGNIFICANT CHANGE UP (ref 135–146)
SODIUM SERPL-SCNC: 138 MMOL/L — SIGNIFICANT CHANGE UP (ref 135–146)
WBC # BLD: 10.67 K/UL — SIGNIFICANT CHANGE UP (ref 4.8–10.8)
WBC # BLD: 10.67 K/UL — SIGNIFICANT CHANGE UP (ref 4.8–10.8)
WBC # FLD AUTO: 10.67 K/UL — SIGNIFICANT CHANGE UP (ref 4.8–10.8)
WBC # FLD AUTO: 10.67 K/UL — SIGNIFICANT CHANGE UP (ref 4.8–10.8)

## 2024-01-08 PROCEDURE — 99239 HOSP IP/OBS DSCHRG MGMT >30: CPT

## 2024-01-08 PROCEDURE — 88312 SPECIAL STAINS GROUP 1: CPT | Mod: 26

## 2024-01-08 PROCEDURE — 45378 DIAGNOSTIC COLONOSCOPY: CPT

## 2024-01-08 PROCEDURE — 88305 TISSUE EXAM BY PATHOLOGIST: CPT | Mod: 26

## 2024-01-08 PROCEDURE — 43239 EGD BIOPSY SINGLE/MULTIPLE: CPT | Mod: XS

## 2024-01-08 RX ORDER — ATORVASTATIN CALCIUM 80 MG/1
1 TABLET, FILM COATED ORAL
Qty: 0 | Refills: 0 | DISCHARGE
Start: 2024-01-08

## 2024-01-08 RX ORDER — SODIUM CHLORIDE 9 MG/ML
1000 INJECTION, SOLUTION INTRAVENOUS
Refills: 0 | Status: DISCONTINUED | OUTPATIENT
Start: 2024-01-08 | End: 2024-01-08

## 2024-01-08 RX ORDER — FERROUS SULFATE 325(65) MG
325 TABLET ORAL DAILY
Refills: 0 | Status: DISCONTINUED | OUTPATIENT
Start: 2024-01-08 | End: 2024-01-08

## 2024-01-08 RX ORDER — OLANZAPINE 15 MG/1
2.5 TABLET, FILM COATED ORAL AT BEDTIME
Refills: 0 | Status: DISCONTINUED | OUTPATIENT
Start: 2024-01-08 | End: 2024-01-08

## 2024-01-08 RX ORDER — PANTOPRAZOLE SODIUM 20 MG/1
40 TABLET, DELAYED RELEASE ORAL
Refills: 0 | Status: DISCONTINUED | OUTPATIENT
Start: 2024-01-08 | End: 2024-01-08

## 2024-01-08 RX ORDER — TAMSULOSIN HYDROCHLORIDE 0.4 MG/1
0.4 CAPSULE ORAL AT BEDTIME
Refills: 0 | Status: DISCONTINUED | OUTPATIENT
Start: 2024-01-08 | End: 2024-01-08

## 2024-01-08 RX ORDER — TAMSULOSIN HYDROCHLORIDE 0.4 MG/1
1 CAPSULE ORAL
Qty: 0 | Refills: 0 | DISCHARGE
Start: 2024-01-08

## 2024-01-08 RX ORDER — ATORVASTATIN CALCIUM 80 MG/1
10 TABLET, FILM COATED ORAL AT BEDTIME
Refills: 0 | Status: DISCONTINUED | OUTPATIENT
Start: 2024-01-08 | End: 2024-01-08

## 2024-01-08 RX ORDER — ACETAMINOPHEN 500 MG
650 TABLET ORAL EVERY 6 HOURS
Refills: 0 | Status: DISCONTINUED | OUTPATIENT
Start: 2024-01-08 | End: 2024-01-08

## 2024-01-08 RX ORDER — AMLODIPINE BESYLATE 2.5 MG/1
2.5 TABLET ORAL DAILY
Refills: 0 | Status: DISCONTINUED | OUTPATIENT
Start: 2024-01-08 | End: 2024-01-08

## 2024-01-08 RX ORDER — FERROUS SULFATE 325(65) MG
1 TABLET ORAL
Qty: 0 | Refills: 0 | DISCHARGE
Start: 2024-01-08

## 2024-01-08 RX ORDER — ONDANSETRON 8 MG/1
4 TABLET, FILM COATED ORAL ONCE
Refills: 0 | Status: DISCONTINUED | OUTPATIENT
Start: 2024-01-08 | End: 2024-01-08

## 2024-01-08 RX ADMIN — SODIUM CHLORIDE 100 MILLILITER(S): 9 INJECTION, SOLUTION INTRAVENOUS at 11:59

## 2024-01-08 NOTE — DISCHARGE NOTE PROVIDER - PROVIDER TOKENS
PROVIDER:[TOKEN:[[id number]],FOLLOWUP:[1 week]],PROVIDER:[TOKEN:[[id number]],FOLLOWUP:[1 week]],PROVIDER:[TOKEN:[[id number]],FOLLOWUP:[2 weeks]],PROVIDER:[TOKEN:[[id number]],FOLLOWUP:[2 weeks]],PROVIDER:[TOKEN:[[id number]],FOLLOWUP:[1 month]] PROVIDER:[TOKEN:[[id number]],FOLLOWUP:[1 week]],PROVIDER:[TOKEN:[[id number]],FOLLOWUP:[1 week]],PROVIDER:[TOKEN:[[id number]],FOLLOWUP:[2 weeks]],PROVIDER:[TOKEN:[[id number]],FOLLOWUP:[2 weeks]],PROVIDER:[TOKEN:[[id number]],FOLLOWUP:[1 month]]

## 2024-01-08 NOTE — DISCHARGE NOTE PROVIDER - NSDCFUADDINST_GEN_ALL_CORE_FT
Things to follow up:  -PCP follow up in 1-2 weeks  -Follow up with gastroenterologist in 4-8 weeks as outpatient; you may need a capsule endoscopy; Follow up with our [facility] located at [address] [place] [address]. Phone Number: [phone]   -Follow up with hematologist as outpatient in 2-4 weeks  -Follow up with your neurologist Dr. Castillo and psychiatrist as outpatient  -Continue taking home medications  -Follow up with nephrologist as outpatient in 4 weeks Things to follow up:  -PCP follow up in 1-2 weeks  -Follow up with gastroenterologist in 4-8 weeks as outpatient; you may need a capsule endoscopy; Follow up with our [facility] located at [address] [place] [address]. Phone Number: [phone]   -Follow up with hematologist as outpatient in 2-4 weeks  -Follow up with your neurologist Dr. Castillo and psychiatrist as outpatient  -Continue taking home medications  -Follow up with nephrologist as outpatient in 4 weeks

## 2024-01-08 NOTE — DISCHARGE NOTE PROVIDER - NSDCMRMEDTOKEN_GEN_ALL_CORE_FT
atorvastatin 10 mg oral tablet: 1 tab(s) orally once a day (at bedtime)  ferrous sulfate 325 mg (65 mg elemental iron) oral tablet: 1 tab(s) orally once a day  losartan 100 mg oral tablet: 1 tab(s) orally once a day  PARoxetine 40 mg oral tablet: 1 tab(s) orally once a day  pravastatin 20 mg oral tablet: 1 tab(s) orally once a day  tamsulosin 0.4 mg oral capsule: 1 cap(s) orally once a day (at bedtime)  ZyPREXA 5 mg oral tablet: 1 tab(s) orally once a day

## 2024-01-08 NOTE — DISCHARGE NOTE PROVIDER - CARE PROVIDERS DIRECT ADDRESSES
,[email or web address],DirectAddress_Unknown,DirectAddress_Unknown,DirectAddress_Unknown [email or web address],DirectAddress_Unknown,DirectAddress_Unknown,DirectAddress_Unknown

## 2024-01-08 NOTE — DISCHARGE NOTE PROVIDER - NPI NUMBER (FOR SYSADMIN USE ONLY) :
[[id number]],[[id number]],[[id number]],[[id number]] [[id number]],[[id number]],[[id number]],[[id number]],[[id number]]

## 2024-01-08 NOTE — DISCHARGE NOTE NURSING/CASE MANAGEMENT/SOCIAL WORK - NSDCPEFALRISK_GEN_ALL_CORE
For information on Fall & Injury Prevention, visit: [email or web address] OR  [email or web address] OR  https://www.cdc.gov/steadi/patient.html For information on Fall & Injury Prevention, visit: [email or web address]://www.[email or web address]/[email or web address]-[email or web address]-[email or web address]-[email or web address]-mobility OR  https://www.[facility]/news/fall-prevention-tips-to-avoid-injury OR  https://www.cdc.gov/steadi/patient.html

## 2024-01-08 NOTE — DISCHARGE NOTE NURSING/CASE MANAGEMENT/SOCIAL WORK - PATIENT PORTAL LINK FT
You can access the FollowMyHealth Patient Portal offered by [facility] by registering at the following website: [email or web address]. By joining [employer]’s FollowMyHealth portal, you will also be able to view your health information using other applications (apps) compatible with our system. You can access the FollowMyHealth Patient Portal offered by [facility] by registering at the following website: [email or web address]. By joining [employer]’s FollowMyHealth portal, you will also be able to view your health information using other applications (apps) compatible with our system.

## 2024-01-08 NOTE — CHART NOTE - NSCHARTNOTEFT_GEN_A_CORE
Please refer to sunrise results section for EGD/Colonoscopy findings and recommendations   all discontinued via transfer orders reinitiated and Hospitalist MUST review ALL orders to assure they are accurate and correct

## 2024-01-08 NOTE — DISCHARGE NOTE PROVIDER - CARE PROVIDER_API CALL
Pasquale Jimenez  Internal Medicine  [address] [place] [address]  Phone: [phone]  Fax: [phone]  Follow Up Time: 1 week    Aleks Johnson  Gastroenterology  [address] [place] [address]  Phone: [phone]  Fax: [phone]  Follow Up Time: 1 week    Uziel Castillo  Neurology  [address] [place] [address]  Phone: [phone]  Fax: [phone]  Follow Up Time: 2 weeks    Simón Engle  Psychiatry  [address] [place] [address]  Phone: [phone]  Fax: [phone]  Follow Up Time: 2 weeks    Brandi Manning  Nephrology  [address] [place] [address]  Phone: [phone]  Fax: [phone]  Follow Up Time: 1 month   Pasquale Jimenez  Internal Medicine  [address] [place] [address]  Phone: [phone]  Fax: [phone]  Follow Up Time: 1 week    Aleks Johnson  Gastroenterology  [address] [place] [address]  Phone: [phone]  Fax: [phone]  Follow Up Time: 1 week    Uziel Castillo  Neurology  [address] [place] [address]  Phone: [phone]  Fax: [phone]  Follow Up Time: 2 weeks    Simón Engle  Psychiatry  [address] [place] [address]  Phone: [phone]  Fax: [phone]  Follow Up Time: 2 weeks    Brandi Manning  Nephrology  [address] [place] [address]  Phone: [phone]  Fax: [phone]  Follow Up Time: 1 month

## 2024-01-08 NOTE — DISCHARGE NOTE NURSING/CASE MANAGEMENT/SOCIAL WORK - NSDPDISTO_GEN_ALL_CORE
[facility] in [facility] in [place]

## 2024-01-08 NOTE — DISCHARGE NOTE PROVIDER - HOSPITAL COURSE
74yo M PMHx  of HTN, DL, BPH, depression presents to ER with daughter c/o confusion and frequent falls, found to have NACHO and anemia. Patient was admitted for Multiple Falls & Confusion likely Metabolic Encephalopathy. CT head negative for acute pathology. Neurology and PT evaluated him. Pt recommend STR. Patient was also evaluated by nephrologist for NACHO likely pre-renal. NACHO resolved with IVF. Patient was also found to have Acute on chronic anemia likely multifactorial anemia of chronic disease. He was treated with 1 Unit packed red blood cells; his hemoglobin level improved (10.5 today). GI evaluated him and EGD and colonoscopy was performed. EGD showed: Normal mucosa in the whole esophagus; Erosions in the antrum compatible with erosive gastritis, (Biopsy); abnormal mucosa in the stomach body compatible with non-erosive gastritis, (Biopsy); Normal mucosa in the whole examined duodenum (Biopsy). Colonoscopy was also performed which showed Grade/Stage II internal hemorrhoids; Normal mucosa in the terminal ileum; Normal mucosa in the colon; Mild diverticulosis of the sigmoid colon. At this time patient is medically stable for a hospital discharge.    Things to follow up:  -PCP follow up in 1-2 weeks  -Follow up with gastroenterologist in 4-8 weeks as outpatient; you may need a capsule endoscopy; Follow up with our [facility] located at [address] [place] [address]. Phone Number: [phone]   -Follow up with hematologist as outpatient in 2-4 weeks  -Follow up with your neurologist Dr. Castillo and psychiatrist as outpatient  -Continue taking home medications  -Follow up with nephrologist as outpatient in 4 weeks     Patient was seen and examined at bedside; patient denies any complaints; discharge planning was discussed with patient and son.  Vital Signs Last 24 Hrs  T(C): 36.6 (08 Jan 2024 12:33), Max: 36.6 (08 Jan 2024 12:33)  T(F): 97.8 (08 Jan 2024 12:33), Max: 97.8 (08 Jan 2024 12:33)  HR: 62 (08 Jan 2024 12:48) (62 - 94)  BP: 166/71 (08 Jan 2024 12:48) (139/60 - 181/70)  BP(mean): 89 (08 Jan 2024 11:46) (89 - 89)  RR: 12 (08 Jan 2024 12:48) (12 - 18)  SpO2: 96% (08 Jan 2024 12:48) (96% - 100%)    Parameters below as of 08 Jan 2024 11:56  Patient On (Oxygen Delivery Method): room air    SKIN: warm, dry, +Multiple areas of bruising in different stages of healing  HEAD: Normocephalic; old superficial abrasions   EYES: PERRL, EOMI, normal sclera and conjunctiva   NECK: Supple; non tender.  CARD:  Regular rate and rhythm.   RESP: CTAb/l, no w/r/r  ABD: soft ntnd, VIRGIE negative.  +Bruising to bilateral buttocks.  EXT: Normal ROM.    NEURO: AAOx2 moving all extremities  PSYCH: Cooperative, appropriate.   74yo M PMHx  of HTN, DL, BPH, depression presents to ER with daughter c/o confusion and frequent falls, found to have NACHO and anemia. Patient was admitted for Multiple Falls & Confusion likely Metabolic Encephalopathy. CT head negative for acute pathology. Neurology and PT evaluated him. Pt recommend STR. Patient was also evaluated by nephrologist for NACHO likely pre-renal. NACHO resolved with IVF. Patient was also found to have Acute on chronic anemia likely multifactorial anemia of chronic disease. He was treated with 1 Unit packed red blood cells; his hemoglobin level improved (10.5 today). GI evaluated him and EGD and colonoscopy was performed. EGD showed: Normal mucosa in the whole esophagus; Erosions in the antrum compatible with erosive gastritis, (Biopsy); abnormal mucosa in the stomach body compatible with non-erosive gastritis, (Biopsy); Normal mucosa in the whole examined duodenum (Biopsy). Colonoscopy was also performed which showed Grade/Stage II internal hemorrhoids; Normal mucosa in the terminal ileum; Normal mucosa in the colon; Mild diverticulosis of the sigmoid colon. At this time patient is medically stable for a hospital discharge.    Things to follow up:  -PCP follow up in 1-2 weeks  -Follow up with gastroenterologist in 4-8 weeks as outpatient; you may need a capsule endoscopy; Follow up with our [facility] located at [address] [place] [address]. Phone Number: [phone]   -Follow up with hematologist as outpatient in 2-4 weeks  -Follow up with your neurologist Dr. Castillo and psychiatrist as outpatient  -Continue taking home medications  -Follow up with nephrologist as outpatient in 4 weeks     Patient was seen and examined at bedside; patient denies any complaints; discharge planning was discussed with patient and son.  Vital Signs Last 24 Hrs  T(C): 36.6 (08 Jan 2024 12:33), Max: 36.6 (08 Jan 2024 12:33)  T(F): 97.8 (08 Jan 2024 12:33), Max: 97.8 (08 Jan 2024 12:33)  HR: 62 (08 Jan 2024 12:48) (62 - 94)  BP: 166/71 (08 Jan 2024 12:48) (139/60 - 181/70)  BP(mean): 89 (08 Jan 2024 11:46) (89 - 89)  RR: 12 (08 Jan 2024 12:48) (12 - 18)  SpO2: 96% (08 Jan 2024 12:48) (96% - 100%)    Parameters below as of 08 Jan 2024 11:56  Patient On (Oxygen Delivery Method): room air    SKIN: warm, dry, +Multiple areas of bruising in different stages of healing  HEAD: Normocephalic; old superficial abrasions   EYES: PERRL, EOMI, normal sclera and conjunctiva   NECK: Supple; non tender.  CARD:  Regular rate and rhythm.   RESP: CTAb/l, no w/r/r  ABD: soft ntnd, VIRGIE negative.  +Bruising to bilateral buttocks.  EXT: Normal ROM.    NEURO: AAOx2 moving all extremities  PSYCH: Cooperative, appropriate.

## 2024-01-08 NOTE — DISCHARGE NOTE PROVIDER - NSDCCPCAREPLAN_GEN_ALL_CORE_FT
PRINCIPAL DISCHARGE DIAGNOSIS  Diagnosis: Fall  Assessment and Plan of Treatment: presents to ER with daughter c/o confusion and frequent falls, found to have NACHO and anemia. Patient was admitted for Multiple Falls & Confusion likely Metabolic Encephalopathy. CT head negative for acute pathology. Neurology and PT evaluated him. Pt recommend STR. Patient was also evaluated by nephrologist for NACHO likely pre-renal. NACHO resolved with IVF. Patient was also found to have Acute on chronic anemia likely multifactorial anemia of chronic disease. He was treated with 1 Unit packed red blood cells; his hemoglobin level improved (10.5 today). GI evaluated him and EGD and colonoscopy was performed. EGD showed: Normal mucosa in the whole esophagus; Erosions in the antrum compatible with erosive gastritis, (Biopsy); abnormal mucosa in the stomach body compatible with non-erosive gastritis, (Biopsy); Normal mucosa in the whole examined duodenum (Biopsy). Colonoscopy was also performed which showed Grade/Stage II internal hemorrhoids; Normal mucosa in the terminal ileum; Normal mucosa in the colon; Mild diverticulosis of the sigmoid colon. At this time patient is medically stable for a hospital discharge.  Things to follow up:  -PCP follow up in 1-2 weeks  -Follow up with gastroenterologist in 4-8 weeks as outpatient; you may need a capsule endoscopy; Follow up with our [facility] located at [address] [place] [address]. Phone Number: [phone]   -Follow up with hematologist as outpatient in 2-4 weeks  -Follow up with your neurologist Dr. Castillo and psychiatrist as outpatient  -Continue taking home medications  -Follow up with nephrologist as outpatient in 4 weeks      SECONDARY DISCHARGE DIAGNOSES  Diagnosis: NACHO (acute kidney injury)  Assessment and Plan of Treatment:     Diagnosis: Anemia  Assessment and Plan of Treatment:      PRINCIPAL DISCHARGE DIAGNOSIS  Diagnosis: Fall  Assessment and Plan of Treatment: presents to ER with daughter c/o confusion and frequent falls, found to have NACHO and anemia. Patient was admitted for Multiple Falls & Confusion likely Metabolic Encephalopathy. CT head negative for acute pathology. Neurology and PT evaluated him. Pt recommend STR. Patient was also evaluated by nephrologist for NACHO likely pre-renal. NACHO resolved with IVF. Patient was also found to have Acute on chronic anemia likely multifactorial anemia of chronic disease. He was treated with 1 Unit packed red blood cells; his hemoglobin level improved (10.5 today). GI evaluated him and EGD and colonoscopy was performed. EGD showed: Normal mucosa in the whole esophagus; Erosions in the antrum compatible with erosive gastritis, (Biopsy); abnormal mucosa in the stomach body compatible with non-erosive gastritis, (Biopsy); Normal mucosa in the whole examined duodenum (Biopsy). Colonoscopy was also performed which showed Grade/Stage II internal hemorrhoids; Normal mucosa in the terminal ileum; Normal mucosa in the colon; Mild diverticulosis of the sigmoid colon. At this time patient is medically stable for a hospital discharge.  Things to follow up:  -PCP follow up in 1-2 weeks  -Follow up with gastroenterologist in 4-8 weeks as outpatient; you may need a capsule endoscopy; Follow up with our [facility] located at [address] [place] [address]. Phone Number: [phone]   -Follow up with hematologist as outpatient in 2-4 weeks  -Follow up with your neurologist Dr. Castillo and psychiatrist as outpatient  -Continue taking home medications  -Follow up with nephrologist as outpatient in 4 weeks      SECONDARY DISCHARGE DIAGNOSES  Diagnosis: NACHO (acute kidney injury)  Assessment and Plan of Treatment:     Diagnosis: Anemia  Assessment and Plan of Treatment:

## 2024-01-09 LAB
% GAMMA, URINE: 9.9 % — SIGNIFICANT CHANGE UP
% GAMMA, URINE: 9.9 % — SIGNIFICANT CHANGE UP
ALBUMIN 24H MFR UR ELPH: 30 % — SIGNIFICANT CHANGE UP
ALBUMIN 24H MFR UR ELPH: 30 % — SIGNIFICANT CHANGE UP
ALPHA1 GLOB 24H MFR UR ELPH: 33.9 % — SIGNIFICANT CHANGE UP
ALPHA1 GLOB 24H MFR UR ELPH: 33.9 % — SIGNIFICANT CHANGE UP
ALPHA2 GLOB 24H MFR UR ELPH: 13.1 % — SIGNIFICANT CHANGE UP
ALPHA2 GLOB 24H MFR UR ELPH: 13.1 % — SIGNIFICANT CHANGE UP
B-GLOBULIN 24H MFR UR ELPH: 13.1 % — SIGNIFICANT CHANGE UP
B-GLOBULIN 24H MFR UR ELPH: 13.1 % — SIGNIFICANT CHANGE UP
INTERPRETATION 24H UR IFE-IMP: SIGNIFICANT CHANGE UP
INTERPRETATION 24H UR IFE-IMP: SIGNIFICANT CHANGE UP
M PROTEIN 24H UR ELPH-MRATE: SIGNIFICANT CHANGE UP
M PROTEIN 24H UR ELPH-MRATE: SIGNIFICANT CHANGE UP
PROT ?TM UR-MCNC: 24 MG/DL — HIGH (ref 0–12)
PROT ?TM UR-MCNC: 24 MG/DL — HIGH (ref 0–12)
PROT PATTERN 24H UR ELPH-IMP: SIGNIFICANT CHANGE UP
PROT PATTERN 24H UR ELPH-IMP: SIGNIFICANT CHANGE UP

## 2024-01-10 ENCOUNTER — NON-APPOINTMENT (OUTPATIENT)
Age: 74
End: 2024-01-10

## 2024-01-10 NOTE — DISCUSSION/SUMMARY
[FreeTextEntry1] : I spoke with pt's son, Hugo.  Pt was discharged from the hospital, without any particular medical cause for his falls found.  He is now in a short-term rehab, in hopes he will be able to build up enough strength to safely return home.  Hugo wanted to verify the medications and dosages.  He will let me know when it might make sense to schedule a follow-up appointment.
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
## 2024-01-12 DIAGNOSIS — K29.00 ACUTE GASTRITIS WITHOUT BLEEDING: ICD-10-CM

## 2024-01-12 DIAGNOSIS — N17.9 ACUTE KIDNEY FAILURE, UNSPECIFIED: ICD-10-CM

## 2024-01-12 DIAGNOSIS — F32.A DEPRESSION, UNSPECIFIED: ICD-10-CM

## 2024-01-12 DIAGNOSIS — N43.3 HYDROCELE, UNSPECIFIED: ICD-10-CM

## 2024-01-12 DIAGNOSIS — M48.56XA COLLAPSED VERTEBRA, NOT ELSEWHERE CLASSIFIED, LUMBAR REGION, INITIAL ENCOUNTER FOR FRACTURE: ICD-10-CM

## 2024-01-12 DIAGNOSIS — G93.41 METABOLIC ENCEPHALOPATHY: ICD-10-CM

## 2024-01-12 DIAGNOSIS — K64.1 SECOND DEGREE HEMORRHOIDS: ICD-10-CM

## 2024-01-12 DIAGNOSIS — D63.8 ANEMIA IN OTHER CHRONIC DISEASES CLASSIFIED ELSEWHERE: ICD-10-CM

## 2024-01-12 DIAGNOSIS — Z41.8 ENCOUNTER FOR OTHER PROCEDURES FOR PURPOSES OTHER THAN REMEDYING HEALTH STATE: ICD-10-CM

## 2024-01-12 DIAGNOSIS — Z60.2 PROBLEMS RELATED TO LIVING ALONE: ICD-10-CM

## 2024-01-12 DIAGNOSIS — S30.0XXA CONTUSION OF LOWER BACK AND PELVIS, INITIAL ENCOUNTER: ICD-10-CM

## 2024-01-12 DIAGNOSIS — R29.6 REPEATED FALLS: ICD-10-CM

## 2024-01-12 DIAGNOSIS — Z91.81 HISTORY OF FALLING: ICD-10-CM

## 2024-01-12 DIAGNOSIS — M62.82 RHABDOMYOLYSIS: ICD-10-CM

## 2024-01-12 DIAGNOSIS — K57.30 DIVERTICULOSIS OF LARGE INTESTINE WITHOUT PERFORATION OR ABSCESS WITHOUT BLEEDING: ICD-10-CM

## 2024-01-12 DIAGNOSIS — S00.93XA CONTUSION OF UNSPECIFIED PART OF HEAD, INITIAL ENCOUNTER: ICD-10-CM

## 2024-01-12 DIAGNOSIS — I10 ESSENTIAL (PRIMARY) HYPERTENSION: ICD-10-CM
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
## 2024-01-12 DIAGNOSIS — N40.0 BENIGN PROSTATIC HYPERPLASIA WITHOUT LOWER URINARY TRACT SYMPTOMS: ICD-10-CM

## 2024-01-12 DIAGNOSIS — K76.0 FATTY (CHANGE OF) LIVER, NOT ELSEWHERE CLASSIFIED: ICD-10-CM

## 2024-01-12 SDOH — SOCIAL STABILITY - SOCIAL INSECURITY: PROBLEMS RELATED TO LIVING ALONE: Z60.2

## 2024-01-16 LAB
SURGICAL PATHOLOGY STUDY: SIGNIFICANT CHANGE UP
SURGICAL PATHOLOGY STUDY: SIGNIFICANT CHANGE UP

## 2024-02-02 ENCOUNTER — APPOINTMENT (OUTPATIENT)
Dept: NEUROLOGY | Facility: CLINIC | Age: 74
End: 2024-02-02

## 2024-03-20 ENCOUNTER — NON-APPOINTMENT (OUTPATIENT)
Age: 74
End: 2024-03-20

## 2024-03-20 NOTE — DISCUSSION/SUMMARY
[FreeTextEntry1] : There has been no word from pt or his son regarding pt's living status or need for continued services from this clinic.  As there has been no request for medication refills, it seems likely that another provider (likely at an assisted living facility) has assumed this responsibility.  I attempted outreach by phone earlier this month.  I left a message with son, asking that he provide an update, and verify if pt is indeed getting treatment elsewhere.  There has been no response.  I will have a 10-day letter sent today and close chart if no further communication.

## 2024-03-25 ENCOUNTER — APPOINTMENT (OUTPATIENT)
Dept: NEUROLOGY | Facility: CLINIC | Age: 74
End: 2024-03-25

## 2024-04-04 ENCOUNTER — NON-APPOINTMENT (OUTPATIENT)
Age: 74
End: 2024-04-04

## 2024-04-04 DIAGNOSIS — F33.3 MAJOR DEPRESSIVE DISORDER, RECURRENT, SEVERE WITH PSYCHOTIC SYMPTOMS: ICD-10-CM

## 2024-04-04 NOTE — REASON FOR VISIT
[Patient no longer able to participate due to medical/psychiatric reasons] : Patient no longer able to participate due to medical/psychiatric reasons [FreeTextEntry8] : 4/4/24 [FreeTextEntry2] : Depression

## 2024-04-04 NOTE — DISCUSSION/SUMMARY
[FreeTextEntry1] : Pt was unable to attend in-person visits and could not negotiate technology for virtual visits.   We were able to have 4 telephone sessions, and her routinely reported feeling stable on his meds.  Our last such session was 11/22/23.  Subsequent to that, I had been in contact several times with pt's son by telephone.  Pt had several medical hospitalizations for falls and confusion.  The etiology for these difficulties was not clear.  He was eventually discharge to a rehab, and his son did not think he would likely be able to live independently anymore.  I had asked his son to keep me informed about this, but he lost contact.  There was no response to outreach by phone or letter.

## 2024-04-04 NOTE — HISTORY OF PRESENT ILLNESS
[FreeTextEntry1] : From my first appointment with pt on 4/10/23:  "First visit with new prescriber. Chart reviewed and discussed with pt. 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 Pt does not have much recall of his sx at that time. Says he was "all messed up," but has difficulty naming many specific sx. He recalls feeling depressed and losing his appetite. He says he also lost a significant amount of weight. He does not recall any sx of psychosis. Treatment has been very helpful, however, and he has been feeling quite well and stable. So much so that he was considering coming off of meds. He says that his children advised him not to, so he will continue. Pt was educated about staying on meds in order to prevent recurrence of sx, and he voiced understanding. Pt lives alone, and he is able to care for himself. He goes to the [facility] on weekdays to use the gym and to have lunch. Pt says mood is good. Normal sleep and appetite. No complaints at this time." [FreeTextEntry2] : No hx of IPP treatment. Denies any suicidal ideation or attempts.

## 2025-03-11 NOTE — H&P PST ADULT - BP NONINVASIVE MEAN (MM HG)
Detail Level: Simple
89
Instructions: This plan will send the code FBSE to the PM system.  DO NOT or CHANGE the price.
Price (Do Not Change): 0.00
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
